# Patient Record
Sex: FEMALE | Race: WHITE | Employment: OTHER | URBAN - NONMETROPOLITAN AREA
[De-identification: names, ages, dates, MRNs, and addresses within clinical notes are randomized per-mention and may not be internally consistent; named-entity substitution may affect disease eponyms.]

---

## 2017-03-21 ENCOUNTER — HOSPITAL ENCOUNTER (EMERGENCY)
Facility: HOSPITAL | Age: 67
Discharge: HOME | End: 2017-03-21
Attending: NURSE PRACTITIONER | Admitting: NURSE PRACTITIONER
Payer: COMMERCIAL

## 2017-03-21 VITALS
TEMPERATURE: 97.1 F | RESPIRATION RATE: 18 BRPM | SYSTOLIC BLOOD PRESSURE: 165 MMHG | HEART RATE: 108 BPM | DIASTOLIC BLOOD PRESSURE: 95 MMHG | OXYGEN SATURATION: 98 %

## 2017-03-21 DIAGNOSIS — F32.A DEPRESSION, UNSPECIFIED DEPRESSION TYPE: ICD-10-CM

## 2017-03-21 DIAGNOSIS — E11.9 DIABETES MELLITUS WITHOUT COMPLICATION (H): ICD-10-CM

## 2017-03-21 DIAGNOSIS — R23.8 VESICULAR ERUPTION: ICD-10-CM

## 2017-03-21 LAB
ALBUMIN SERPL-MCNC: 3.5 G/DL (ref 3.4–5)
ALBUMIN UR-MCNC: NEGATIVE MG/DL
ALP SERPL-CCNC: 103 U/L (ref 40–150)
ALT SERPL W P-5'-P-CCNC: 37 U/L (ref 0–50)
ANION GAP SERPL CALCULATED.3IONS-SCNC: 6 MMOL/L (ref 3–14)
APPEARANCE UR: CLEAR
AST SERPL W P-5'-P-CCNC: 21 U/L (ref 0–45)
BACTERIA #/AREA URNS HPF: ABNORMAL /HPF
BASOPHILS # BLD AUTO: 0.1 10E9/L (ref 0–0.2)
BASOPHILS NFR BLD AUTO: 0.9 %
BILIRUB SERPL-MCNC: 0.5 MG/DL (ref 0.2–1.3)
BILIRUB UR QL STRIP: NEGATIVE
BUN SERPL-MCNC: 8 MG/DL (ref 7–30)
CALCIUM SERPL-MCNC: 8.8 MG/DL (ref 8.5–10.1)
CHLORIDE SERPL-SCNC: 101 MMOL/L (ref 94–109)
CO2 SERPL-SCNC: 27 MMOL/L (ref 20–32)
COLOR UR AUTO: YELLOW
CREAT SERPL-MCNC: 0.63 MG/DL (ref 0.52–1.04)
DIFFERENTIAL METHOD BLD: ABNORMAL
EOSINOPHIL # BLD AUTO: 0.2 10E9/L (ref 0–0.7)
EOSINOPHIL NFR BLD AUTO: 3.5 %
ERYTHROCYTE [DISTWIDTH] IN BLOOD BY AUTOMATED COUNT: 13.6 % (ref 10–15)
EST. AVERAGE GLUCOSE BLD GHB EST-MCNC: 280 MG/DL
GFR SERPL CREATININE-BSD FRML MDRD: ABNORMAL ML/MIN/1.7M2
GLUCOSE SERPL-MCNC: 294 MG/DL (ref 70–99)
GLUCOSE UR STRIP-MCNC: >1000 MG/DL
HBA1C MFR BLD: 11.4 % (ref 4.3–6)
HCT VFR BLD AUTO: 48 % (ref 35–47)
HGB BLD-MCNC: 15.8 G/DL (ref 11.7–15.7)
HGB UR QL STRIP: NEGATIVE
IMM GRANULOCYTES # BLD: 0 10E9/L (ref 0–0.4)
IMM GRANULOCYTES NFR BLD: 0.3 %
KETONES UR STRIP-MCNC: NEGATIVE MG/DL
LEUKOCYTE ESTERASE UR QL STRIP: ABNORMAL
LYMPHOCYTES # BLD AUTO: 1.4 10E9/L (ref 0.8–5.3)
LYMPHOCYTES NFR BLD AUTO: 24.6 %
MCH RBC QN AUTO: 28.3 PG (ref 26.5–33)
MCHC RBC AUTO-ENTMCNC: 32.9 G/DL (ref 31.5–36.5)
MCV RBC AUTO: 86 FL (ref 78–100)
MICRO REPORT STATUS: NORMAL
MONOCYTES # BLD AUTO: 0.3 10E9/L (ref 0–1.3)
MONOCYTES NFR BLD AUTO: 5.9 %
NEUTROPHILS # BLD AUTO: 3.7 10E9/L (ref 1.6–8.3)
NEUTROPHILS NFR BLD AUTO: 64.8 %
NITRATE UR QL: NEGATIVE
NRBC # BLD AUTO: 0 10*3/UL
NRBC BLD AUTO-RTO: 0 /100
PH UR STRIP: 7 PH (ref 4.7–8)
PLATELET # BLD AUTO: 245 10E9/L (ref 150–450)
POTASSIUM SERPL-SCNC: 3.9 MMOL/L (ref 3.4–5.3)
PROT SERPL-MCNC: 7 G/DL (ref 6.8–8.8)
RBC # BLD AUTO: 5.58 10E12/L (ref 3.8–5.2)
RBC #/AREA URNS AUTO: 2 /HPF (ref 0–2)
SODIUM SERPL-SCNC: 134 MMOL/L (ref 133–144)
SP GR UR STRIP: 1.03 (ref 1–1.03)
SPECIMEN SOURCE: NORMAL
SQUAMOUS #/AREA URNS AUTO: 6 /HPF (ref 0–1)
URN SPEC COLLECT METH UR: ABNORMAL
UROBILINOGEN UR STRIP-MCNC: NORMAL MG/DL (ref 0–2)
WBC # BLD AUTO: 5.7 10E9/L (ref 4–11)
WBC #/AREA URNS AUTO: 4 /HPF (ref 0–2)
WET PREP SPEC: NORMAL

## 2017-03-21 PROCEDURE — 81001 URINALYSIS AUTO W/SCOPE: CPT | Performed by: NURSE PRACTITIONER

## 2017-03-21 PROCEDURE — 80053 COMPREHEN METABOLIC PANEL: CPT | Performed by: NURSE PRACTITIONER

## 2017-03-21 PROCEDURE — 99214 OFFICE O/P EST MOD 30 MIN: CPT

## 2017-03-21 PROCEDURE — 83036 HEMOGLOBIN GLYCOSYLATED A1C: CPT | Performed by: NURSE PRACTITIONER

## 2017-03-21 PROCEDURE — 36415 COLL VENOUS BLD VENIPUNCTURE: CPT | Performed by: NURSE PRACTITIONER

## 2017-03-21 PROCEDURE — 87529 HSV DNA AMP PROBE: CPT | Performed by: NURSE PRACTITIONER

## 2017-03-21 PROCEDURE — 85025 COMPLETE CBC W/AUTO DIFF WBC: CPT | Performed by: NURSE PRACTITIONER

## 2017-03-21 PROCEDURE — 87210 SMEAR WET MOUNT SALINE/INK: CPT | Performed by: NURSE PRACTITIONER

## 2017-03-21 PROCEDURE — 40000788 ZZHCL STATISTIC ESTIMATED AVERAGE GLUCOSE: Performed by: NURSE PRACTITIONER

## 2017-03-21 PROCEDURE — 99213 OFFICE O/P EST LOW 20 MIN: CPT | Performed by: NURSE PRACTITIONER

## 2017-03-21 PROCEDURE — 87529 HSV DNA AMP PROBE: CPT | Mod: 59 | Performed by: NURSE PRACTITIONER

## 2017-03-21 RX ORDER — VALACYCLOVIR HYDROCHLORIDE 1 G/1
1000 TABLET, FILM COATED ORAL 2 TIMES DAILY
Qty: 14 TABLET | Refills: 0 | Status: SHIPPED | OUTPATIENT
Start: 2017-03-21 | End: 2017-03-22

## 2017-03-21 RX ORDER — ALBUTEROL SULFATE 90 UG/1
2 AEROSOL, METERED RESPIRATORY (INHALATION) EVERY 4 HOURS PRN
Qty: 1 INHALER | Refills: 0 | Status: SHIPPED | OUTPATIENT
Start: 2017-03-21 | End: 2018-07-14

## 2017-03-21 RX ORDER — FAMCICLOVIR 250 MG/1
500 TABLET ORAL 3 TIMES DAILY
Qty: 42 TABLET | Refills: 0 | Status: SHIPPED | OUTPATIENT
Start: 2017-03-21 | End: 2017-03-22

## 2017-03-21 ASSESSMENT — ENCOUNTER SYMPTOMS
FEVER: 0
APPETITE CHANGE: 0
CHILLS: 0
ARTHRALGIAS: 0
HEMATURIA: 0
FREQUENCY: 0
MYALGIAS: 0
FATIGUE: 0
DYSURIA: 0

## 2017-03-21 NOTE — ED NOTES
"Patient being evaluated today for swollen genitalia X 3 days. She notes that for the past 2 months she has been having external itching, and a \"lump\" that she would notice on occasion. She has been treating at home with heat, showers, and vagisil. Patient notes some relief from itching, but now new swelling noted. Patient has appt next week to establish with PCP after a lapse in insurance. Patient denies any fever or discharge.   "

## 2017-03-21 NOTE — ED AVS SNAPSHOT
[facility]    [address] [place] [address]    Phone:  [phone]                                       Sola Gan   MRN: [id number]    Department:  [facility]   Date of Visit:  3/21/2017           After Visit Summary Signature Page     I have received my discharge instructions, and my questions have been answered. I have discussed any challenges I see with this plan with the nurse or doctor.    ..........................................................................................................................................  Patient/Patient Representative Signature      ..........................................................................................................................................  Patient Representative Print Name and Relationship to Patient    ..................................................               ................................................  Date                                            Time    ..........................................................................................................................................  Reviewed by Signature/Title    ...................................................              ..............................................  Date                                                            Time

## 2017-03-21 NOTE — ED AVS SNAPSHOT
[facility]    [address] [address]    Phone:  [phone]                                       Sola Gan   MRN: [id number]    Department:  [facility]   Date of Visit:  3/21/2017           Patient Information     Date Of Birth          1950        Your diagnoses for this visit were:     Diabetes mellitus without complication (H)     Vesicular eruption genital, HSV testing pending    Depression, unspecified depression type        You were seen by Zuri Contreras NP.      Follow-up Information     Follow up with [facility].    Specialty:  EMERGENCY MEDICINE    Why:  As needed, If symptoms worsen, or concerns develop    Contact information:    [address] [address] [phone]    Additional information:    From [facility]: Take [address]. Turn left at [address]. Turn left at the first stoplight on [address] [place] [address]. At the first stop sign, take a right onto [address]. Take a left into the parking lot and continue through until you reach the North enterance of the building.       From [place]: Take [address]. Take the [address] ramp towards [place]. Turn left onto [address]. Take a slight right onto [address]. Turn left at the first stoplight on [address]. At the first stop sign, take a right onto [address]. Take a left into the parking lot and continue through until you reach the North enterance of the building.       From [place]: Take [address]. Take a right at [address]. At the first stop sign, take a right onto [address]. Take a left into the parking lot and continue through until you reach the North enterance of the building.         Follow up with Favian Recinos MD.    Specialty:  Family Practice    Why:  Keep scheduled appointment for establish care on Tuesday 3-29    Contact information:    [facility]  [facility]  [address]  [facility] [address]  [phone]        Discharge References/Attachments     DEPRESSION (ENGLISH)    DEPRESSION, COUNSELING FOR (ENGLISH)    DIABETES, DO YOU HAVE? (ENGLISH)    DIABETIC DIET (ENGLISH)    HERPES GENITALIS, HSV: TYPE II (ENGLISH)      Future Appointments        Provider Department Dept Phone Center    3/28/2017 10:30 AM Favian Recinos MD [facility] [phone] [facility]      ED Discharge Orders     DIABETES EDUCATION REFERRAL ([place])       DIABETES SELF-MANAGEMENT TRAINING (DSMT)  Type of training and number of hours requested:  Initial Group DMST; per educater discretion    (Medicare will cover:10 hours initial DSMT in 12-month period, plus 2 hours follow-up DSMT annually)    Please add if the patient has special educational need: None and Other: per educator discretion     (Patients with special needs requiring individual DSMT)    Please include the following DMST content: All ten content areas, as appropriate    Patient has the following:Obesity    Please begin Medical Nutritional Therapy.  Initial Medical Nutritional Therapy (MNT)  ([employer] allows 3 hours initial MNT in the first calendar year, plus two hours follow up MNT annually.  Additional MNT hours are available for change in medical condition treatment and/or diagnosis)    Additional Services Provided:  >>A1c will be completed upon referral and completion of program unless completed in clinic.  >>Influenza vaccination assessment (form #N228) as applicable.  >>Order for diabetes supplies will be faxed to patient's pharmacy.  >>If on insulin: Insulin dose adjustment per staged Diabetes Mgmt. Protocols    [facility]  [facility]  Telephone:  [phone]   Fax:  [phone]            MENTAL HEALTH REFERRAL       Your provider has referred you to:  [facility], Tatyana Che or Zuri Ricci    All scheduling is subject to the client's  specific insurance plan & benefits, provider/location availability, and provider clinical specialities.  Please arrive 15 minutes early for your first appointment and bring your completed paperwork.    Please be aware that coverage of these services is subject to the terms and limitations of your health insurance plan.  Call member services at your health plan with any benefit or coverage questions.                     Review of your medicines      START taking        Dose / Directions Last dose taken    metFORMIN 500 MG tablet   Commonly known as:  GLUCOPHAGE   Dose:  500 mg   Quantity:  60 tablet        Take 1 tablet (500 mg) by mouth 2 times daily (with meals)   Refills:  0        valACYclovir 1000 mg tablet   Commonly known as:  VALTREX   Dose:  1000 mg   Quantity:  14 tablet        Take 1 tablet (1,000 mg) by mouth 2 times daily for 7 days   Refills:  0          CONTINUE these medicines which may have CHANGED, or have new prescriptions. If we are uncertain of the size of tablets/capsules you have at home, strength may be listed as something that might have changed.        Dose / Directions Last dose taken    * ALBUTEROL INHALER 17GM   Dose:  2 puff   What changed:  Another medication with the same name was added. Make sure you understand how and when to take each.   Quantity:  1 Inhaler        Inhale 2 puffs into the lungs 4 times daily as needed This product no longer available   Refills:  0        * albuterol 108 (90 BASE) MCG/ACT Inhaler   Commonly known as:  albuterol   Dose:  2 puff   What changed:  You were already taking a medication with the same name, and this prescription was added. Make sure you understand how and when to take each.   Quantity:  1 Inhaler        Inhale 2 puffs into the lungs every 4 hours as needed for shortness of breath / dyspnea   Refills:  0        * Notice:  This list has 2 medication(s) that are the same as other medications prescribed for you. Read the directions carefully,  and ask your doctor or other care provider to review them with you.      Our records show that you are taking the medicines listed below. If these are incorrect, please call your family doctor or clinic.        Dose / Directions Last dose taken    ADVIL PO   Dose:  600 mg        Take 600 mg by mouth every 8 hours as needed for moderate pain   Refills:  0        hydrochlorothiazide 25 MG tablet   Commonly known as:  HYDRODIURIL   Dose:  25 mg   Quantity:  30 tablet        Take 1 tablet (25 mg) by mouth daily   Refills:  2        Ipratropium-Albuterol  MCG/ACT inhaler   Commonly known as:  COMBIVENT RESPIMAT   Dose:  1 puff   Quantity:  1 Inhaler        Inhale 1 puff into the lungs 4 times daily as needed for shortness of breath / dyspnea or wheezing Not to exceed 6 doses per day.   Refills:  1        lisinopril 10 MG tablet   Commonly known as:  PRINIVIL/ZESTRIL   Dose:  10 mg   Quantity:  15 tablet        Take 1 tablet (10 mg) by mouth daily   Refills:  1        loratadine 10 MG tablet   Commonly known as:  CLARITIN   Dose:  10 mg        Take 10 mg by mouth daily.   Refills:  0        PEPCID PO   Dose:  10 mg        Take 10 mg by mouth daily.   Refills:  0                Prescriptions were sent or printed at these locations (3 Prescriptions)                   [facility] [address] [facility], [place] - [address] AT [facility] [address]   [address], [facility] [address]    Telephone:  [phone]   Fax:  [phone]   Hours:                  E-Prescribed (3 of 3)         valACYclovir (VALTREX) 1000 mg tablet               metFORMIN (GLUCOPHAGE) 500 MG tablet               albuterol (ALBUTEROL) 108 (90 BASE) MCG/ACT Inhaler                Procedures and tests performed during your visit     CBC with platelets differential    Comprehensive metabolic panel    Estimated Average Glucose    HSV 1 and 2 DNA by PCR    Hemoglobin A1c    UA reflex to Microscopic and Culture    Wet prep      Orders Needing  "Specimen Collection     None      Pending Results     Date and Time Order Name Status Description    3/21/2017 1252 Estimated Average Glucose In process     3/21/2017 1222 HSV 1 and 2 DNA by PCR In process             Pending Culture Results     Date and Time Order Name Status Description    3/21/2017 1222 HSV 1 and 2 DNA by PCR In process             Thank you for choosing [facility]       Thank you for choosing [facility] for your care. Our goal is always to provide you with excellent care. Hearing back from our patients is one way we can continue to improve our services. Please take a few minutes to complete the written survey that you may receive in the mail after you visit with us. Thank you!        [employer] Information     [employer] lets you send messages to your doctor, view your test results, renew your prescriptions, schedule appointments and more. To sign up, go to [email or web address] . Click on \"Log in\" on the left side of the screen, which will take you to the Welcome page. Then click on \"Sign up Now\" on the right side of the page.     You will be asked to enter the access code listed below, as well as some personal information. Please follow the directions to create your username and password.     Your access code is: [email or web address]  Expires: 2017  2:06 PM     Your access code will  in 90 days. If you need help or a new code, please call your [facility] clinic or [phone].        Care EveryWhere ID     This is your Care EveryWhere ID. This could be used by other organizations to access your [facility] medical records  [id number]        After Visit Summary       This is your record. Keep this with you and show to your community pharmacist(s) and doctor(s) at your next visit.                  "

## 2017-03-21 NOTE — ED PROVIDER NOTES
"  History     Chief Complaint   Patient presents with     Vaginal Itching     for months     Wound Check     \"lump\" to the left of clitoris, intermittent swelling     The history is provided by the patient. No  was used.     Sola Gan is a 66 year old female who presents today with a CC of a \"nodule\" by her clitoris.  She notes that she has been having vaginal itching for 3-4 months.  She was treating the itching with Vagisil cream, warm soaks, OTC cortisone cream.  She also added tea tree oil and lavender essential oils.  This has helped decrease the itching.  She denies vaginal discharge.  No fevers.  She is post menopausal.  She is not taking any hormones.  She is not currently sexual active, reports has not been sexually active for several years.   She denies concern for STI.  She has been treated for a squamous cell carcinoma 7 years ago.  She has a history of genital warts, no known history of herpes.  She has a history of COPD, hypertension.    She is a 1/2 ppd smoker.     I have reviewed the Medications, Allergies, Past Medical and Surgical History, and Social History in the Epic system.    Review of Systems   Constitutional: Negative for appetite change, chills, fatigue and fever.   Genitourinary: Positive for genital sores. Negative for dysuria, frequency, hematuria, pelvic pain, vaginal bleeding, vaginal discharge and vaginal pain.   Musculoskeletal: Negative for arthralgias and myalgias.   Psychiatric/Behavioral:        Reports a history of depression, not currently on antidepressants, requests Mental Health referral for counseling, no suicidal ideation       Physical Exam   BP: 165/95  Pulse: 108  Temp: 97.1  F (36.2  C)  Resp: 18  SpO2: 98 %    Physical Exam   Constitutional: She is oriented to person, place, and time. She appears well-developed and well-nourished. She is cooperative.   HENT:   Head: Normocephalic and atraumatic.   Neck: Normal range of motion. Neck supple. "   Cardiovascular: Normal rate and regular rhythm.    Pulmonary/Chest: Effort normal and breath sounds normal.   Abdominal: Soft. Bowel sounds are normal. There is no tenderness. There is no CVA tenderness.   Abdomen is obese   Genitourinary: There is tenderness and lesion (diffuse ulcerated and vesicular lesions on labia majora and labia minora bilaterally, lesions are exquisitely TTP) on the right labia. There is no injury on the right labia. There is tenderness and lesion on the left labia. There is no injury on the left labia. Cervix exhibits no motion tenderness and no friability. Right adnexum displays no mass, no tenderness and no fullness. Left adnexum displays no mass, no tenderness and no fullness. No erythema, tenderness or bleeding in the vagina. No foreign body in the vagina. No signs of injury around the vagina. Vaginal discharge (mild amount of thin white) found.   Lymphadenopathy:     She has no cervical adenopathy.        Right: No supraclavicular adenopathy present.        Left: No supraclavicular adenopathy present.   Neurological: She is alert and oriented to person, place, and time.   Skin: Skin is warm and dry.   Psychiatric:   Teary when discussed new diagnosis of DM, but discussed that she is hopeful for the future.  Is motivated to quit smoking, eat better, start exercising.  Reports history of depression, has been on Prozac in past, no currently treated with counseling or antidepressants.     Nursing note and vitals reviewed.      ED Course     ED Course     Procedures     Results for orders placed or performed during the hospital encounter of 03/21/17   UA reflex to Microscopic and Culture   Result Value Ref Range    Color Urine Yellow     Appearance Urine Clear     Glucose Urine >1000 (A) NEG mg/dL    Bilirubin Urine Negative NEG    Ketones Urine Negative NEG mg/dL    Specific Gravity Urine 1.033 1.003 - 1.035    Blood Urine Negative NEG    pH Urine 7.0 4.7 - 8.0 pH    Protein Albumin  Urine Negative NEG mg/dL    Urobilinogen mg/dL Normal 0.0 - 2.0 mg/dL    Nitrite Urine Negative NEG    Leukocyte Esterase Urine Small (A) NEG    Source Midstream Urine     RBC Urine 2 0 - 2 /HPF    WBC Urine 4 (H) 0 - 2 /HPF    Bacteria Urine None (A) NEG /HPF    Squamous Epithelial /HPF Urine 6 (H) 0 - 1 /HPF   Hemoglobin A1c   Result Value Ref Range    Hemoglobin A1C 11.4 (H) 4.3 - 6.0 %   Comprehensive metabolic panel   Result Value Ref Range    Sodium 134 133 - 144 mmol/L    Potassium 3.9 3.4 - 5.3 mmol/L    Chloride 101 94 - 109 mmol/L    Carbon Dioxide 27 20 - 32 mmol/L    Anion Gap 6 3 - 14 mmol/L    Glucose 294 (H) 70 - 99 mg/dL    Urea Nitrogen 8 7 - 30 mg/dL    Creatinine 0.63 0.52 - 1.04 mg/dL    GFR Estimate >90  Non  GFR Calc   >60 mL/min/1.7m2    GFR Estimate If Black >90   GFR Calc   >60 mL/min/1.7m2    Calcium 8.8 8.5 - 10.1 mg/dL    Bilirubin Total 0.5 0.2 - 1.3 mg/dL    Albumin 3.5 3.4 - 5.0 g/dL    Protein Total 7.0 6.8 - 8.8 g/dL    Alkaline Phosphatase 103 40 - 150 U/L    ALT 37 0 - 50 U/L    AST 21 0 - 45 U/L   CBC with platelets differential   Result Value Ref Range    WBC 5.7 4.0 - 11.0 10e9/L    RBC Count 5.58 (H) 3.8 - 5.2 10e12/L    Hemoglobin 15.8 (H) 11.7 - 15.7 g/dL    Hematocrit 48.0 (H) 35.0 - 47.0 %    MCV 86 78 - 100 fl    MCH 28.3 26.5 - 33.0 pg    MCHC 32.9 31.5 - 36.5 g/dL    RDW 13.6 10.0 - 15.0 %    Platelet Count 245 150 - 450 10e9/L    Diff Method Automated Method     % Neutrophils 64.8 %    % Lymphocytes 24.6 %    % Monocytes 5.9 %    % Eosinophils 3.5 %    % Basophils 0.9 %    % Immature Granulocytes 0.3 %    Nucleated RBCs 0 0 /100    Absolute Neutrophil 3.7 1.6 - 8.3 10e9/L    Absolute Lymphocytes 1.4 0.8 - 5.3 10e9/L    Absolute Monocytes 0.3 0.0 - 1.3 10e9/L    Absolute Eosinophils 0.2 0.0 - 0.7 10e9/L    Absolute Basophils 0.1 0.0 - 0.2 10e9/L    Abs Immature Granulocytes 0.0 0 - 0.4 10e9/L    Absolute Nucleated RBC 0.0    Estimated  Average Glucose   Result Value Ref Range    Estimated Average Glucose 280 mg/dL   Wet prep   Result Value Ref Range    Specimen Description Vagina     Wet Prep       No Trichomonas seen  No clue cells seen  No yeast seen  Rare WBC'S seen      Micro Report Status FINAL 03/21/2017      > 1000 mg/dl glucose in urine so further lab work up done.      Assessments & Plan (with Medical Decision Making)     I have reviewed the nursing notes.    I have reviewed the findings, diagnosis, plan and need for follow up with the patient.  ASSESSMENT / PLAN:  (E11.9) Diabetes mellitus without complication (H)  Comment: new diagnosis, incidental finding  Plan:  DIABETES EDUCATION REFERRAL ([place]) - patient has appointment tomorrow morning at 0800        Metformin as prescribed    (R23.8) Vesicular eruption  Comment: genital, HSV testing pending  Plan:  Will treat for suspected herpes outbreak, testing pending, benefit of treatment of possible negative HSV outweighs risk of treatment.  Advised patient we will call with results when available   Keep scheduled appointment with Dr Recinos 1 week from today to establish care    (F32.9) Depression, unspecified depression type  Comment: acute on chronic  Plan:  MENTAL HEALTH REFERRAL - per patient request      Discharge Medication List as of 3/21/2017  2:06 PM      START taking these medications    Details   valACYclovir (VALTREX) 1000 mg tablet Take 1 tablet (1,000 mg) by mouth 2 times daily for 7 days, Disp-14 tablet, R-0, E-Prescribe      metFORMIN (GLUCOPHAGE) 500 MG tablet Take 1 tablet (500 mg) by mouth 2 times daily (with meals), Disp-60 tablet, R-0, E-Prescribe      albuterol (ALBUTEROL) 108 (90 BASE) MCG/ACT Inhaler Inhale 2 puffs into the lungs every 4 hours as needed for shortness of breath / dyspnea, Disp-1 Inhaler, R-0, E-Prescribe             Final diagnoses:   Diabetes mellitus without complication (H)   Vesicular eruption - genital, HSV testing pending   Depression,  unspecified depression type       3/21/2017   [facility]     Zuri Contreras NP  03/22/17 0802

## 2017-03-21 NOTE — ED NOTES
Patient discharged to home per self. Patient verbalize/demonstrate understanding of all written and verbal instructions. Prescriptions escribed to local pharmacy. All questions answered.

## 2017-03-22 ENCOUNTER — HOSPITAL ENCOUNTER (OUTPATIENT)
Dept: EDUCATION SERVICES | Facility: HOSPITAL | Age: 67
Discharge: HOME | End: 2017-03-22
Attending: FAMILY MEDICINE | Admitting: FAMILY MEDICINE
Payer: COMMERCIAL

## 2017-03-22 ENCOUNTER — HOSPITAL ENCOUNTER (EMERGENCY)
Facility: HOSPITAL | Age: 67
Discharge: HOME | End: 2017-03-22
Attending: NURSE PRACTITIONER | Admitting: NURSE PRACTITIONER
Payer: COMMERCIAL

## 2017-03-22 VITALS
OXYGEN SATURATION: 94 % | HEIGHT: 61 IN | BODY MASS INDEX: 48.24 KG/M2 | SYSTOLIC BLOOD PRESSURE: 160 MMHG | HEART RATE: 103 BPM | DIASTOLIC BLOOD PRESSURE: 96 MMHG | WEIGHT: 255.5 LBS

## 2017-03-22 VITALS
SYSTOLIC BLOOD PRESSURE: 160 MMHG | HEART RATE: 101 BPM | DIASTOLIC BLOOD PRESSURE: 95 MMHG | TEMPERATURE: 98.5 F | OXYGEN SATURATION: 97 % | RESPIRATION RATE: 18 BRPM

## 2017-03-22 DIAGNOSIS — R11.0 NAUSEA: ICD-10-CM

## 2017-03-22 DIAGNOSIS — E11.65 TYPE 2 DIABETES MELLITUS WITH HYPERGLYCEMIA, WITHOUT LONG-TERM CURRENT USE OF INSULIN (H): Primary | ICD-10-CM

## 2017-03-22 DIAGNOSIS — Z71.89 ACP (ADVANCE CARE PLANNING): Chronic | ICD-10-CM

## 2017-03-22 PROCEDURE — 99213 OFFICE O/P EST LOW 20 MIN: CPT | Performed by: NURSE PRACTITIONER

## 2017-03-22 PROCEDURE — 25000125 ZZHC RX 250: Performed by: NURSE PRACTITIONER

## 2017-03-22 PROCEDURE — G0108 DIAB MANAGE TRN  PER INDIV: HCPCS

## 2017-03-22 PROCEDURE — 99213 OFFICE O/P EST LOW 20 MIN: CPT

## 2017-03-22 RX ORDER — ONDANSETRON 4 MG/1
4 TABLET, ORALLY DISINTEGRATING ORAL ONCE
Status: COMPLETED | OUTPATIENT
Start: 2017-03-22 | End: 2017-03-22

## 2017-03-22 RX ADMIN — ONDANSETRON 4 MG: 4 TABLET, ORALLY DISINTEGRATING ORAL at 11:37

## 2017-03-22 ASSESSMENT — ANXIETY QUESTIONNAIRES
GAD7 TOTAL SCORE: 6
1. FEELING NERVOUS, ANXIOUS, OR ON EDGE: SEVERAL DAYS
2. NOT BEING ABLE TO STOP OR CONTROL WORRYING: MORE THAN HALF THE DAYS
5. BEING SO RESTLESS THAT IT IS HARD TO SIT STILL: NOT AT ALL
7. FEELING AFRAID AS IF SOMETHING AWFUL MIGHT HAPPEN: NOT AT ALL
6. BECOMING EASILY ANNOYED OR IRRITABLE: SEVERAL DAYS
IF YOU CHECKED OFF ANY PROBLEMS ON THIS QUESTIONNAIRE, HOW DIFFICULT HAVE THESE PROBLEMS MADE IT FOR YOU TO DO YOUR WORK, TAKE CARE OF THINGS AT HOME, OR GET ALONG WITH OTHER PEOPLE: NOT DIFFICULT AT ALL
3. WORRYING TOO MUCH ABOUT DIFFERENT THINGS: SEVERAL DAYS

## 2017-03-22 ASSESSMENT — PAIN SCALES - GENERAL: PAINLEVEL: NO PAIN (0)

## 2017-03-22 ASSESSMENT — ENCOUNTER SYMPTOMS
NEUROLOGICAL NEGATIVE: 1
NAUSEA: 1
VOMITING: 0
ABDOMINAL PAIN: 0

## 2017-03-22 ASSESSMENT — PATIENT HEALTH QUESTIONNAIRE - PHQ9: 5. POOR APPETITE OR OVEREATING: SEVERAL DAYS

## 2017-03-22 NOTE — DISCHARGE INSTRUCTIONS
Stop Acyclovir for 24 hours. If nausea resolves will request a prior authorization for another anti-viral medication.   Continue with metformin 1 time daily as advised by diabetic educater.   Call here tomorrow and let us know how you are feeling. Based on symptoms it will help us figure out what is causing the nausea and will treat accordingly.      Drink clear liquids and eat bland foods for a few hours and advance your diet as tolerated.

## 2017-03-22 NOTE — IP AVS SNAPSHOT
MRN:[id number]                      After Visit Summary   3/22/2017    Sola Gan    MRN: [id number]           Thank you!     Thank you for choosing [facility] for your care. Our goal is always to provide you with excellent care. Hearing back from our patients is one way we can continue to improve our services. Please take a few minutes to complete the written survey that you may receive in the mail after you visit with us. Thank you!        Patient Information     Date Of Birth          1950        About your hospital stay     You were admitted on:  March 22, 2017 You last received care in the:  [facility]    You were discharged on:  March 22, 2017       Who to Call     For medical emergencies, please call 911.  For non-urgent questions about your medical care, please call your primary care provider or clinic, None          Attending Provider     Provider Specialty    Favian Recinos MD Family Practice       Primary Care Provider    None       No address on file        Your next 10 appointments already scheduled     Mar 28, 2017 10:30 AM CDT   (Arrive by 10:15 AM)   Office Visit with Favian Recinos MD   [facility] ([facility])    [address] [place] [address]   [phone]           Bring a current list of meds and any records pertaining to this visit.  For Physicals, please bring immunization records and any forms needing to be filled out.    Please arrive 15 minutes early to complete paperwork and register.              Further instructions from your care team       Please test your BG every morning before you have your coffee and/or eat and test 2 hours after your evening meal.    Keep a food log    Take Metformin 500 mg daily for a week then increase to Metformin 500 mg am and pm.    Return to Diabetes Ed - Wednesday,April 5, 2017, 10:45 am.    Questions/concerns: nurse: [phone] or HUC: [phone]    Pending Results     Date and Time  "Order Name Status Description    3/21/2017 1222 HSV 1 and 2 DNA by PCR In process             Admission Information     Date & Time Provider Department Dept. Phone    3/22/2017 Favian Recinos MD [facility] [phone]      Your Vitals Were     Blood Pressure Pulse Height Weight Pulse Oximetry BMI (Body Mass Index)    160/96 (BP Location: Right arm, Patient Position: Chair, Cuff Size: Adult Large) 103 1.549 m (5' 1\") 115.9 kg (255 lb 8 oz) 94% 48.28 kg/m2      My[employer] Information     [employer] lets you send messages to your doctor, view your test results, renew your prescriptions, schedule appointments and more. To sign up, go to [email or web address] . Click on \"Log in\" on the left side of the screen, which will take you to the Welcome page. Then click on \"Sign up Now\" on the right side of the page.     You will be asked to enter the access code listed below, as well as some personal information. Please follow the directions to create your username and password.     Your access code is: [email or web address]  Expires: 2017  2:06 PM     Your access code will  in 90 days. If you need help or a new code, please call your [facility] clinic or [phone].        Care EveryWhere ID     This is your Care EveryWhere ID. This could be used by other organizations to access your [facility] medical records  [id number]           Review of your medicines      UNREVIEWED medicines. Ask your doctor about these medicines        Dose / Directions    ACYCLOVIR PO        Taking 5 times a day for 7 days   Refills:  0       ADVIL PO        Dose:  600 mg   Take 600 mg by mouth every 8 hours as needed for moderate pain   Refills:  0       albuterol 108 (90 BASE) MCG/ACT Inhaler   Commonly known as:  albuterol        Dose:  2 puff   Inhale 2 puffs into the lungs every 4 hours as needed for shortness of breath / dyspnea   Quantity:  1 Inhaler   Refills:  0       hydrochlorothiazide 25 MG tablet   Commonly known as:  " HYDRODIURIL   Used for:  HTN (hypertension)        Dose:  25 mg   Take 1 tablet (25 mg) by mouth daily   Quantity:  30 tablet   Refills:  2       Ipratropium-Albuterol  MCG/ACT inhaler   Commonly known as:  COMBIVENT RESPIMAT   Used for:  COPD (chronic obstructive pulmonary disease) (H)        Dose:  1 puff   Inhale 1 puff into the lungs 4 times daily as needed for shortness of breath / dyspnea or wheezing Not to exceed 6 doses per day.   Quantity:  1 Inhaler   Refills:  1       lisinopril 10 MG tablet   Commonly known as:  PRINIVIL/ZESTRIL        Dose:  10 mg   Take 1 tablet (10 mg) by mouth daily   Quantity:  15 tablet   Refills:  1       loratadine 10 MG tablet   Commonly known as:  CLARITIN        Dose:  10 mg   Take 10 mg by mouth daily.   Refills:  0       metFORMIN 500 MG tablet   Commonly known as:  GLUCOPHAGE        Dose:  500 mg   Take 1 tablet (500 mg) by mouth 2 times daily (with meals)   Quantity:  60 tablet   Refills:  0       PEPCID PO        Dose:  10 mg   Take 10 mg by mouth daily.   Refills:  0                Protect others around you: Learn how to safely use, store and throw away your medicines at www.disposemymeds.org.             Medication List: This is a list of all your medications and when to take them. Check marks below indicate your daily home schedule. Keep this list as a reference.      Medications           Morning Afternoon Evening Bedtime As Needed    ACYCLOVIR PO   Taking 5 times a day for 7 days                                ADVIL PO   Take 600 mg by mouth every 8 hours as needed for moderate pain                                albuterol 108 (90 BASE) MCG/ACT Inhaler   Commonly known as:  albuterol   Inhale 2 puffs into the lungs every 4 hours as needed for shortness of breath / dyspnea                                hydrochlorothiazide 25 MG tablet   Commonly known as:  HYDRODIURIL   Take 1 tablet (25 mg) by mouth daily                                Ipratropium-Albuterol   MCG/ACT inhaler   Commonly known as:  COMBIVENT RESPIMAT   Inhale 1 puff into the lungs 4 times daily as needed for shortness of breath / dyspnea or wheezing Not to exceed 6 doses per day.                                lisinopril 10 MG tablet   Commonly known as:  PRINIVIL/ZESTRIL   Take 1 tablet (10 mg) by mouth daily                                loratadine 10 MG tablet   Commonly known as:  CLARITIN   Take 10 mg by mouth daily.                                metFORMIN 500 MG tablet   Commonly known as:  GLUCOPHAGE   Take 1 tablet (500 mg) by mouth 2 times daily (with meals)                                PEPCID PO   Take 10 mg by mouth daily.

## 2017-03-22 NOTE — ED PROVIDER NOTES
History     Chief Complaint   Patient presents with     Nausea     from taking new meds that were Rx'd yesterday. would like a Rx for zofran if possible     The history is provided by the patient. No  was used.     Sola Gan is a 66 year old female who presents with complaints of nausea. Requesting zofran. Yesterday Sola was here and started on Valtrex for a vesicular eruption on the labia and metformin 500 mg BID for new diagnosis of diabetes. Diabetic educator decreased her to metformin 1 time daily for a week. Has taken pepto for symptoms and no relief. Has not had primary care for some time. Is to establish with Dr. Recinos next week.     I have reviewed the Medications, Allergies, Past Medical and Surgical History, and Social History in the Epic system.    Review of Systems   Gastrointestinal: Positive for nausea. Negative for abdominal pain and vomiting.   Neurological: Negative.        Physical Exam   BP: 160/95  Pulse: 101  Temp: 98.5  F (36.9  C)  Resp: 18  SpO2: 97 %  Physical Exam   Constitutional: She is oriented to person, place, and time. She appears well-developed and well-nourished. No distress.   Pulmonary/Chest: Effort normal.   Musculoskeletal: Normal range of motion.   Neurological: She is alert and oriented to person, place, and time.   Skin: She is not diaphoretic.   Psychiatric: She has a normal mood and affect. Her behavior is normal.   Nursing note and vitals reviewed.      ED Course     ED Course     Procedures         Medications   ondansetron (ZOFRAN-ODT) ODT tab 4 mg (not administered)     Assessments & Plan (with Medical Decision Making)     I have reviewed the nursing notes.  I have reviewed the findings, diagnosis, plan and need for follow up with the patient.  Will treat nausea here.   Advised to stop acyclovir for 24 hours.   Call here tomorrow and let me know symptoms. If nausea is resolved will put through prior auth for valtrex.   If sx persist, will  attribute it to metformin and resume acyclovir.     Final diagnoses:   Nausea       3/22/2017   [facility]     Aide Recinos NP  03/22/17 9022

## 2017-03-22 NOTE — PROGRESS NOTES
"Sola is here for Session 1. /96 (BP Location: Right arm, Patient Position: Chair, Cuff Size: Adult Large)  Pulse 103  Ht 1.549 m (5' 1\")  Wt 115.9 kg (255 lb 8 oz)  SpO2 94%  BMI 48.28 kg/m2     BG readings as follows: not testing as yet    Lab Results   Component Value Date    A1C 11.4 03/21/2017       Current diabetes medications: Metformin 500 mg bid    Sola started both Acyclovir and Metformin yesterday. She is experiencing nausea, cramps and diarrhea which may be attributed to the Acyclovir, but may be attributed to Metformin. Instructed her to take one Metformin 500 tablet daily for now until she is done with taking the Acyclovir.    Sola does eat 3 times daily. Starts her day with coffee with creamer. Then a couple of hours later eats breakfast: BishopVineloopn breakfast bowl or a pastry. Lunch is a frozen meal: asian or pasta. Evening meal can be rotisserie chicken with corn and mashed potatoes or pizza. Discusses meal sides can be starchy beans or pork and beans.    Readiness to learn: eager    Barriers to learning: none    Topics covered: Diabetes pathophysiology, symptoms, diagnosis, treatments, medication actions, BG self-monitoring, HgbA1c, targets (blood sugar/A1c), sharps disposal, complications and risk factors,  food planning, benefits of physical activity, carbohydrate counting, and individualized food plan. Encouraged label reading to determine carbs in frozen food choices and recommended some potential frozen food choices    Pt actively participated and demonstrated adequate understanding of topics discussed.    Pt instructed on One Touch Verio glucose meter and appropriately demonstrated use with minimal cueing. Random BG 1 hours post parandial = 325.  Testing supplies ordered.     Plan:  Please test your BG every morning before you have your coffee and/or eat and test 2 hours after your evening meal.    Keep a food log    Take Metformin 500 mg daily for a week then increase to " Metformin 500 mg am and pm.    Return to Diabetes Ed - Wednesday,April 5, 2017, 10:45 am.    Questions/concerns: nurse: [phone] or HUC: [phone]    Time spent with patient: 100 minutes

## 2017-03-22 NOTE — NURSING NOTE
"Chief Complaint   Patient presents with     Diabetes     new       Initial /96 (BP Location: Right arm, Patient Position: Chair, Cuff Size: Adult Large)  Pulse 103  Ht 1.549 m (5' 1\")  Wt 115.9 kg (255 lb 8 oz)  SpO2 94%  BMI 48.28 kg/m2 Estimated body mass index is 48.28 kg/(m^2) as calculated from the following:    Height as of this encounter: 1.549 m (5' 1\").    Weight as of this encounter: 115.9 kg (255 lb 8 oz).  Medication Reconciliation: complete   Tia Virgen      "

## 2017-03-22 NOTE — DISCHARGE INSTRUCTIONS
Please test your BG every morning before you have your coffee and/or eat and test 2 hours after your evening meal.    Keep a food log    Take Metformin 500 mg daily for a week then increase to Metformin 500 mg am and pm.    Return to Diabetes Ed - Wednesday,April 5, 2017, 10:45 am.    Questions/concerns: nurse: [phone] or [facility]: [phone]

## 2017-03-22 NOTE — ED AVS SNAPSHOT
[facility]    [address] [address]    Phone:  [phone]                                       Sola Gan   MRN: [id number]    Department:  [facility]   Date of Visit:  3/22/2017           Patient Information     Date Of Birth          1950        Your diagnoses for this visit were:     Nausea        You were seen by Aide Recinos NP.      Follow-up Information     Please follow up.    Why:  Call here tomorrow with symptom report        Please follow up.    Why:  See Dr. Recinos next week for follow up.        Discharge Instructions       Stop Acyclovir for 24 hours. If nausea resolves will request a prior authorization for another anti-viral medication.   Continue with metformin 1 time daily as advised by diabetic educater.   Call here tomorrow and let us know how you are feeling. Based on symptoms it will help us figure out what is causing the nausea and will treat accordingly.      Drink clear liquids and eat bland foods for a few hours and advance your diet as tolerated.       Future Appointments        Provider Department Dept Phone Center    3/28/2017 10:30 AM Favian Recinos MD [facility] [phone] [facility]         Review of your medicines      Our records show that you are taking the medicines listed below. If these are incorrect, please call your family doctor or clinic.        Dose / Directions Last dose taken    ACYCLOVIR PO        Taking 5 times a day for 7 days   Refills:  0        ADVIL PO   Dose:  600 mg        Take 600 mg by mouth every 8 hours as needed for moderate pain   Refills:  0        albuterol 108 (90 BASE) MCG/ACT Inhaler   Commonly known as:  albuterol   Dose:  2 puff   Quantity:  1 Inhaler        Inhale 2 puffs into the lungs every 4 hours as needed for shortness of breath / dyspnea   Refills:  0        hydrochlorothiazide 25 MG tablet   Commonly known as:  HYDRODIURIL   Dose:  25 mg  "  Quantity:  30 tablet        Take 1 tablet (25 mg) by mouth daily   Refills:  2        Ipratropium-Albuterol  MCG/ACT inhaler   Commonly known as:  COMBIVENT RESPIMAT   Dose:  1 puff   Quantity:  1 Inhaler        Inhale 1 puff into the lungs 4 times daily as needed for shortness of breath / dyspnea or wheezing Not to exceed 6 doses per day.   Refills:  1        lisinopril 10 MG tablet   Commonly known as:  PRINIVIL/ZESTRIL   Dose:  10 mg   Quantity:  15 tablet        Take 1 tablet (10 mg) by mouth daily   Refills:  1        loratadine 10 MG tablet   Commonly known as:  CLARITIN   Dose:  10 mg        Take 10 mg by mouth daily.   Refills:  0        metFORMIN 500 MG tablet   Commonly known as:  GLUCOPHAGE   Dose:  500 mg   Quantity:  60 tablet        Take 1 tablet (500 mg) by mouth 2 times daily (with meals)   Refills:  0        PEPCID PO   Dose:  10 mg        Take 10 mg by mouth daily.   Refills:  0                Orders Needing Specimen Collection     None      Pending Results     Date and Time Order Name Status Description    3/21/2017 1222 HSV 1 and 2 DNA by PCR In process             Pending Culture Results     Date and Time Order Name Status Description    3/21/2017 1222 HSV 1 and 2 DNA by PCR In process             Thank you for choosing [facility]       Thank you for choosing [facility] for your care. Our goal is always to provide you with excellent care. Hearing back from our patients is one way we can continue to improve our services. Please take a few minutes to complete the written survey that you may receive in the mail after you visit with us. Thank you!        [facility] [employer] Information     [employer] lets you send messages to your doctor, view your test results, renew your prescriptions, schedule appointments and more. To sign up, go to [email or web address] . Click on \"Log in\" on the left side of the screen, which will take you to the Welcome page. Then click on \"Sign up Now\" on the right side of the page. "     You will be asked to enter the access code listed below, as well as some personal information. Please follow the directions to create your username and password.     Your access code is: [email or web address]  Expires: 2017  2:06 PM     Your access code will  in 90 days. If you need help or a new code, please call your [place] clinic or [phone].        Care EveryWhere ID     This is your Care EveryWhere ID. This could be used by other organizations to access your [place] medical records  [id number]        After Visit Summary       This is your record. Keep this with you and show to your community pharmacist(s) and doctor(s) at your next visit.

## 2017-03-23 RX ORDER — LANCETS 33 GAUGE
1 EACH MISCELLANEOUS 2 TIMES DAILY
Qty: 100 EACH | Refills: 10 | Status: SHIPPED | OUTPATIENT
Start: 2017-03-23 | End: 2017-04-05

## 2017-03-23 RX ORDER — VALACYCLOVIR HYDROCHLORIDE 1 G/1
1000 TABLET, FILM COATED ORAL 2 TIMES DAILY
Qty: 20 TABLET | Refills: 0 | Status: SHIPPED | OUTPATIENT
Start: 2017-03-23 | End: 2017-04-13

## 2017-03-23 ASSESSMENT — PATIENT HEALTH QUESTIONNAIRE - PHQ9: SUM OF ALL RESPONSES TO PHQ QUESTIONS 1-9: 3

## 2017-03-23 ASSESSMENT — ANXIETY QUESTIONNAIRES: GAD7 TOTAL SCORE: 6

## 2017-03-24 ENCOUNTER — TELEPHONE (OUTPATIENT)
Dept: EDUCATION SERVICES | Facility: HOSPITAL | Age: 67
End: 2017-03-24

## 2017-03-24 LAB
HSV1 DNA SPEC QL NAA+PROBE: NEGATIVE
HSV2 DNA SPEC QL NAA+PROBE: ABNORMAL
SPECIMEN SOURCE: ABNORMAL

## 2017-03-24 NOTE — TELEPHONE ENCOUNTER
Returned patient's call.  Pt was wondering where she could get more test strips.  She has order in place to pick them up at [facility].  She was instructed to do so.

## 2017-03-24 NOTE — ED NOTES
TC to patient, notified that Valtrex Rx was approved and it is available at [facility].

## 2017-03-25 NOTE — PROGRESS NOTES
HSV results reviewed with provider, HAYDER Berry. Pt to continue course of treatment medication that was prescribed. Call placed to pt and informed of results, and the need to finish the course of medication that was prescribed, and to f/u with Dr. Recinos for any further concerns and to establish care as previously discussed. Pt mentioned that her symptoms are resolving. No concerns expressed.

## 2017-03-28 ENCOUNTER — OFFICE VISIT (OUTPATIENT)
Dept: FAMILY MEDICINE | Facility: OTHER | Age: 67
End: 2017-03-28
Attending: FAMILY MEDICINE
Payer: COMMERCIAL

## 2017-03-28 VITALS
BODY MASS INDEX: 47.71 KG/M2 | HEIGHT: 60 IN | RESPIRATION RATE: 14 BRPM | SYSTOLIC BLOOD PRESSURE: 140 MMHG | HEART RATE: 84 BPM | OXYGEN SATURATION: 98 % | WEIGHT: 243 LBS | TEMPERATURE: 98.1 F | DIASTOLIC BLOOD PRESSURE: 90 MMHG

## 2017-03-28 DIAGNOSIS — I10 BENIGN ESSENTIAL HYPERTENSION: ICD-10-CM

## 2017-03-28 DIAGNOSIS — J43.8 OTHER EMPHYSEMA (H): ICD-10-CM

## 2017-03-28 DIAGNOSIS — F32.89 OTHER DEPRESSION: Primary | ICD-10-CM

## 2017-03-28 LAB
ANION GAP SERPL CALCULATED.3IONS-SCNC: 10 MMOL/L (ref 3–14)
BUN SERPL-MCNC: 13 MG/DL (ref 7–30)
CALCIUM SERPL-MCNC: 9.3 MG/DL (ref 8.5–10.1)
CHLORIDE SERPL-SCNC: 103 MMOL/L (ref 94–109)
CHOLEST SERPL-MCNC: 173 MG/DL
CO2 SERPL-SCNC: 26 MMOL/L (ref 20–32)
CREAT SERPL-MCNC: 0.85 MG/DL (ref 0.52–1.04)
CREAT UR-MCNC: 103 MG/DL
GFR SERPL CREATININE-BSD FRML MDRD: 67 ML/MIN/1.7M2
GLUCOSE SERPL-MCNC: 180 MG/DL (ref 70–99)
HDLC SERPL-MCNC: 40 MG/DL
LDLC SERPL CALC-MCNC: 104 MG/DL
MICROALBUMIN UR-MCNC: 18 MG/L
MICROALBUMIN/CREAT UR: 17.09 MG/G CR (ref 0–25)
NONHDLC SERPL-MCNC: 133 MG/DL
POTASSIUM SERPL-SCNC: 3.9 MMOL/L (ref 3.4–5.3)
SODIUM SERPL-SCNC: 139 MMOL/L (ref 133–144)
TRIGL SERPL-MCNC: 147 MG/DL
TSH SERPL DL<=0.005 MIU/L-ACNC: 2.83 MU/L (ref 0.4–4)

## 2017-03-28 PROCEDURE — 84443 ASSAY THYROID STIM HORMONE: CPT | Performed by: FAMILY MEDICINE

## 2017-03-28 PROCEDURE — 80061 LIPID PANEL: CPT | Performed by: FAMILY MEDICINE

## 2017-03-28 PROCEDURE — 86803 HEPATITIS C AB TEST: CPT | Performed by: FAMILY MEDICINE

## 2017-03-28 PROCEDURE — 36415 COLL VENOUS BLD VENIPUNCTURE: CPT | Performed by: FAMILY MEDICINE

## 2017-03-28 PROCEDURE — 99000 SPECIMEN HANDLING OFFICE-LAB: CPT | Performed by: FAMILY MEDICINE

## 2017-03-28 PROCEDURE — 99213 OFFICE O/P EST LOW 20 MIN: CPT

## 2017-03-28 PROCEDURE — 99214 OFFICE O/P EST MOD 30 MIN: CPT | Performed by: FAMILY MEDICINE

## 2017-03-28 PROCEDURE — 80048 BASIC METABOLIC PNL TOTAL CA: CPT | Performed by: FAMILY MEDICINE

## 2017-03-28 PROCEDURE — 82043 UR ALBUMIN QUANTITATIVE: CPT | Performed by: FAMILY MEDICINE

## 2017-03-28 RX ORDER — LISINOPRIL 10 MG/1
10 TABLET ORAL DAILY
Qty: 30 TABLET | Refills: 1 | Status: SHIPPED | OUTPATIENT
Start: 2017-03-28 | End: 2017-05-25

## 2017-03-28 ASSESSMENT — PAIN SCALES - GENERAL: PAINLEVEL: MILD PAIN (3)

## 2017-03-28 NOTE — MR AVS SNAPSHOT
After Visit Summary   3/28/2017    Sola Gan    MRN: [id number]           Patient Information     Date Of Birth          1950        Visit Information        Provider Department      3/28/2017 10:30 AM Favian Recinos MD [facility]        Today's Diagnoses     Other depression    -  1    Uncontrolled type 2 diabetes mellitus with complication, without long-term current use of insulin (H)        Other emphysema (H)        Benign essential hypertension           Follow-ups after your visit        Your next 10 appointments already scheduled     Apr 13, 2017 10:15 AM CDT   (Arrive by 10:00 AM)   SHORT with Favian Recinos MD   [facility] ([facility])    [address] [facility] [address]   [phone]            May 31, 2017 10:00 AM CDT   (Arrive by 9:45 AM)   New Visit with Tatyana Antoine [facility] [place] [facility] ([facility])    [address] [place] [id number] [phone]              Who to contact     If you have questions or need follow up information about today's clinic visit or your schedule please contact [facility] directly at [phone].  Normal or non-critical lab and imaging results will be communicated to you by MyChart, letter or phone within 4 business days after the clinic has received the results. If you do not hear from us within 7 days, please contact the clinic through MyChart or phone. If you have a critical or abnormal lab result, we will notify you by phone as soon as possible.  Submit refill requests through [employer] or call your pharmacy and they will forward the refill request to us. Please allow 3 business days for your refill to be completed.          Additional Information About Your Visit        [facility] Information     [employer] gives you secure access to your electronic health record. If you see a primary care provider, you can also send messages to your care team and  "make appointments. If you have questions, please call your primary care clinic.  If you do not have a primary care provider, please call [phone] and they will assist you.        Care EveryWhere ID     This is your Care EveryWhere ID. This could be used by other organizations to access your [place] medical records  [id number]        Your Vitals Were     Pulse Temperature Respirations Height Pulse Oximetry BMI (Body Mass Index)    84 98.1  F (36.7  C) (Tympanic) 14 5' 0.04\" (1.525 m) 98% 47.39 kg/m2       Blood Pressure from Last 3 Encounters:   03/28/17 140/90   03/22/17 160/95   03/22/17 160/96    Weight from Last 3 Encounters:   03/28/17 243 lb (110.2 kg)   03/22/17 255 lb 8 oz (115.9 kg)   03/30/14 272 lb 0.8 oz (123.4 kg)              We Performed the Following     Albumin Random Urine Quantitative     Basic metabolic panel     Foot Exam - HIM Scan     Hepatitis C antibody     Lipid Profile (Chol, Trig, HDL, LDL calc)     TSH with free T4 reflex          Today's Medication Changes          These changes are accurate as of: 3/28/17 11:40 AM.  If you have any questions, ask your nurse or doctor.               Start taking these medicines.        Dose/Directions    FLUoxetine 20 MG capsule   Commonly known as:  PROzac   Used for:  Other depression   Started by:  Favian Recinos MD        Dose:  20 mg   Take 1 capsule (20 mg) by mouth daily   Quantity:  90 capsule   Refills:  1         Stop taking these medicines if you haven't already. Please contact your care team if you have questions.     ACYCLOVIR PO   Stopped by:  Favian Recinos MD                Where to get your medicines      These medications were sent to [id number] - [place], [place] - [address] AT [facility] [address], [place] [address]     Phone:  [phone]     FLUoxetine 20 MG capsule    Ipratropium-Albuterol  MCG/ACT inhaler    lisinopril 10 MG tablet                Primary Care Provider    " None       No address on file        Thank you!     Thank you for choosing [facility]  for your care. Our goal is always to provide you with excellent care. Hearing back from our patients is one way we can continue to improve our services. Please take a few minutes to complete the written survey that you may receive in the mail after your visit with us. Thank you!             Your Updated Medication List - Protect others around you: Learn how to safely use, store and throw away your medicines at www.disposemymeds.org.          This list is accurate as of: 3/28/17 11:40 AM.  Always use your most recent med list.                   Brand Name Dispense Instructions for use    ADVIL PO      Take 600 mg by mouth every 8 hours as needed for moderate pain       albuterol 108 (90 BASE) MCG/ACT Inhaler    albuterol    1 Inhaler    Inhale 2 puffs into the lungs every 4 hours as needed for shortness of breath / dyspnea       blood glucose monitoring test strip    ONE TOUCH VERIO IQ    100 each    Use to test blood sugar 2 times daily.       FLUoxetine 20 MG capsule    PROzac    90 capsule    Take 1 capsule (20 mg) by mouth daily       hydrochlorothiazide 25 MG tablet    HYDRODIURIL    30 tablet    Take 1 tablet (25 mg) by mouth daily       Ipratropium-Albuterol  MCG/ACT inhaler    COMBIVENT RESPIMAT    1 Inhaler    Inhale 1 puff into the lungs 4 times daily as needed for shortness of breath / dyspnea or wheezing Not to exceed 6 doses per day.       lisinopril 10 MG tablet    PRINIVIL/ZESTRIL    30 tablet    Take 1 tablet (10 mg) by mouth daily       loratadine 10 MG tablet    CLARITIN     Take 10 mg by mouth daily.       metFORMIN 500 MG tablet    GLUCOPHAGE    60 tablet    Take 1 tablet (500 mg) by mouth 2 times daily (with meals)       ONETOUCH DELICA LANCETS 33G Misc     100 each    1 each 2 times daily Test BG 2 times daily       PEPCID PO      Take 10 mg by mouth daily pepcid complete chewable        valACYclovir 1000 mg tablet    VALTREX    20 tablet    Take 1 tablet (1,000 mg) by mouth 2 times daily for 10 days

## 2017-03-28 NOTE — NURSING NOTE
"Chief Complaint   Patient presents with     Establish Care     COPD     needs an order for combivent has been out for 3 years      Hypertension     needs rx rx for lisinopril and she states was on lasix at one time never got lisinopril      *_* Health Care Directive *_*     form given        Initial /84 (BP Location: Left arm, Patient Position: Chair, Cuff Size: Adult Large)  Pulse 84  Temp 98.1  F (36.7  C) (Tympanic)  Resp 14  Ht 5' 0.04\" (1.525 m)  Wt 243 lb (110.2 kg)  SpO2 98%  BMI 47.39 kg/m2 Estimated body mass index is 47.39 kg/(m^2) as calculated from the following:    Height as of this encounter: 5' 0.04\" (1.525 m).    Weight as of this encounter: 243 lb (110.2 kg).  Medication Reconciliation: complete     HEIKE TEJADA      "

## 2017-03-28 NOTE — PROGRESS NOTES
Sola LEWIS Elvia    March 28, 2017    Chief Complaint   Patient presents with     Establish Care     COPD     needs an order for combivent has been out for 3 years      Hypertension     needs rx rx for lisinopril and she states was on lasix at one time never got lisinopril      *_* Health Care Directive *_*     form given        SUBJECTIVE:  Here to establish cares.  Recent dx of dm.  Has obesity, tobacco, remote hx of herpes currently with flare on acyclovir.  Lengthy discussion today.   We discussed health goals, what I'm seeing, vascular health and kidney health, etc.  See below.      Past Medical History:   Diagnosis Date     H/O sleep apnea     tonsils, adnoids, and uvula removed       Past Surgical History:   Procedure Laterality Date     APPENDECTOMY       CHOLECYSTECTOMY       ENT SURGERY      for MORA     GYN SURGERY       HERNIA REPAIR         Current Outpatient Prescriptions   Medication Sig Dispense Refill     FLUoxetine (PROZAC) 20 MG capsule Take 1 capsule (20 mg) by mouth daily 90 capsule 1     Ipratropium-Albuterol (COMBIVENT RESPIMAT)  MCG/ACT inhaler Inhale 1 puff into the lungs 4 times daily as needed for shortness of breath / dyspnea or wheezing Not to exceed 6 doses per day. 1 Inhaler 1     lisinopril (PRINIVIL/ZESTRIL) 10 MG tablet Take 1 tablet (10 mg) by mouth daily 30 tablet 1     valACYclovir (VALTREX) 1000 mg tablet Take 1 tablet (1,000 mg) by mouth 2 times daily for 10 days 20 tablet 0     blood glucose monitoring (ONE TOUCH VERIO IQ) test strip Use to test blood sugar 2 times daily. 100 each 10     ONETOUCH DELICA LANCETS 33G MISC 1 each 2 times daily Test BG 2 times daily 100 each 10     Ibuprofen (ADVIL PO) Take 600 mg by mouth every 8 hours as needed for moderate pain       metFORMIN (GLUCOPHAGE) 500 MG tablet Take 1 tablet (500 mg) by mouth 2 times daily (with meals) 60 tablet 0     albuterol (ALBUTEROL) 108 (90 BASE) MCG/ACT Inhaler Inhale 2 puffs into the lungs every 4 hours as  needed for shortness of breath / dyspnea 1 Inhaler 0     Famotidine (PEPCID PO) Take 10 mg by mouth daily pepcid complete chewable       loratadine (CLARITIN) 10 MG tablet Take 10 mg by mouth daily.       [DISCONTINUED] lisinopril (PRINIVIL,ZESTRIL) 10 MG tablet Take 1 tablet (10 mg) by mouth daily (Patient not taking: Reported on 3/28/2017) 15 tablet 1     hydrochlorothiazide (HYDRODIURIL) 25 MG tablet Take 1 tablet (25 mg) by mouth daily (Patient not taking: Reported on 3/28/2017) 30 tablet 2     [DISCONTINUED] Ipratropium-Albuterol (COMBIVENT RESPIMAT)  MCG/ACT inhaler Inhale 1 puff into the lungs 4 times daily as needed for shortness of breath / dyspnea or wheezing Not to exceed 6 doses per day. (Patient not taking: Reported on 3/28/2017) 1 Inhaler 1       Allergies   Allergen Reactions     Acyclovir Nausea     Sulfa Drugs Hives       Family History   Problem Relation Age of Onset     Other Cancer Mother      ovarian      Breast Cancer Mother      Bronchitis Father      Peripheral Neuropathy Father      Hypertension Father        Social History     Social History     Marital status: Single     Spouse name: N/A     Number of children: N/A     Years of education: N/A     Occupational History     Not on file.     Social History Main Topics     Smoking status: Current Every Day Smoker     Packs/day: 0.50     Years: 20.00     Smokeless tobacco: Never Used     Alcohol use No     Drug use: No     Sexual activity: Not on file     Other Topics Concern     Not on file     Social History Narrative       5 point ROS negative except as noted above in HPI, including Gen., Resp., CV, GI &  system review.     OBJECTIVE:  B/P: 140/90, T: 98.1, P: 84, R: 14    GENERAL APPEARANCE: Alert, no acute distress  CV: regular rate and rhythm, no murmur, rub or gallop  RESP: lungs clear to auscultation bilaterally  ABDOMEN: normal bowel sounds, soft, nontender, no hepatosplenomegaly or other masses.  Obese.    Feet:  Normal  monofilament bilaterally.   SKIN: no suspicious lesions or rashes to visualized skin  NEURO: Alert, oriented x 3, speech and mentation normal    ASSESSMENT and PLAN:  (F32.89) Other depression  (primary encounter diagnosis)  Comment: discussed.    Plan: FLUoxetine (PROZAC) 20 MG capsule        She wants to be back on prozac.  Good response to this medicine in the past.      (E11.8,  E11.65) Uncontrolled type 2 diabetes mellitus with complication, without long-term current use of insulin (H)  Comment: discussed at some length.   Plan: Foot Exam - HIM Scan        Has had some improvement with the metformin start.  Titrate up to bid.  F/u with dm ed again next week.     (J43.8) Other emphysema (H)  Comment: stable.  Plan: Ipratropium-Albuterol (COMBIVENT RESPIMAT)          MCG/ACT inhaler        Advise tobacco cessation.  Once we get this dm stuff all squared up we are going to push on it a bit.  Otherwise, continue previous tx.      (I10) Benign essential hypertension  Comment: borderline.   Plan: lisinopril (PRINIVIL/ZESTRIL) 10 MG tablet        Reviewed.  Indication for ACE either way.  Start lisinopril.  2 week f/u.  Labs today and again in 2 weeks.          45 minutes spent with patient, reviewing chart, and discussing all of the recent dx of dm, future cares going forward, etc.

## 2017-03-30 LAB — HCV AB SERPL QL IA: NORMAL

## 2017-04-05 ENCOUNTER — HOSPITAL ENCOUNTER (OUTPATIENT)
Dept: EDUCATION SERVICES | Facility: HOSPITAL | Age: 67
Discharge: HOME | End: 2017-04-05
Attending: NURSE PRACTITIONER | Admitting: NURSE PRACTITIONER
Payer: COMMERCIAL

## 2017-04-05 VITALS
OXYGEN SATURATION: 97 % | BODY MASS INDEX: 46.63 KG/M2 | WEIGHT: 247 LBS | HEART RATE: 88 BPM | DIASTOLIC BLOOD PRESSURE: 86 MMHG | HEIGHT: 61 IN | SYSTOLIC BLOOD PRESSURE: 142 MMHG

## 2017-04-05 DIAGNOSIS — E11.65 TYPE 2 DIABETES MELLITUS WITH HYPERGLYCEMIA, WITHOUT LONG-TERM CURRENT USE OF INSULIN (H): Primary | ICD-10-CM

## 2017-04-05 PROCEDURE — G0108 DIAB MANAGE TRN  PER INDIV: HCPCS

## 2017-04-05 ASSESSMENT — PAIN SCALES - GENERAL: PAINLEVEL: NO PAIN (0)

## 2017-04-05 NOTE — PROGRESS NOTES
"Sola is here today for Session 2. /86 (BP Location: Right arm, Patient Position: Chair, Cuff Size: Adult Regular)  Pulse 88  Ht 1.549 m (5' 1\")  Wt 112 kg (247 lb)  SpO2 97%  BMI 46.67 kg/m2  She is accompanied by her daughter, [place].      BG readings as follows: fastin-226, post-supper: 171-270, hs: 132, 211-261    Blood pressure 142/86, pulse 88, height 1.549 m (5' 1\"), weight 112 kg (247 lb), SpO2 97 %.    Current diabetes medications: Metformin 500 mg bid, just starting taking the second 500 mg    Readiness to learn: Very eager. Sola and her daughter are very engaged in learning    Barriers to learning: none    Pt actively participated and demonstrated adequate understanding of topics discussed.     Topics covered: evaluating BG self-test results & improving self-testing skills, meter maintenance & , causes & treatment for high & low blood sugar, sick day management skills.     Goals: Will set at next visit    Plan:  Call in BG readings in 2 weeks - Tia [phone]    Follow up:   Please return to Diabetes Ed in one month.      Total time spent with patient: Total visit time 75 minutes      "

## 2017-04-05 NOTE — NURSING NOTE
"Chief Complaint   Patient presents with     Diabetes     session 2       Initial /86 (BP Location: Right arm, Patient Position: Chair, Cuff Size: Adult Regular)  Pulse 88  Ht 1.549 m (5' 1\")  Wt 112 kg (247 lb)  SpO2 97%  BMI 46.67 kg/m2 Estimated body mass index is 46.67 kg/(m^2) as calculated from the following:    Height as of this encounter: 1.549 m (5' 1\").    Weight as of this encounter: 112 kg (247 lb).  Medication Reconciliation: complete   Tia Virgen      "

## 2017-04-05 NOTE — IP AVS SNAPSHOT
MRN:[id number]                      After Visit Summary   4/5/2017    Soal Gan    MRN: [id number]           Thank you!     Thank you for choosing [place] [facility] for your care. Our goal is always to provide you with excellent care. Hearing back from our patients is one way we can continue to improve our services. Please take a few minutes to complete the written survey that you may receive in the mail after you visit with us. Thank you!        Patient Information     Date Of Birth          1950        About your hospital stay     You were admitted on:  April 5, 2017 You last received care in the:  [facility]    You were discharged on:  April 5, 2017       Who to Call     For medical emergencies, please call 911.  For non-urgent questions about your medical care, please call your primary care provider or clinic, None          Attending Provider     Provider Specialty    Lisy Radford NP --       Primary Care Provider    None       No address on file        Your next 10 appointments already scheduled     Apr 13, 2017 10:15 AM CDT   (Arrive by 10:00 AM)   SHORT with Favian Recinos MD   [facility] ([facility])    [address] [place] [address]   [phone]            May 31, 2017 10:00 AM CDT   (Arrive by 9:45 AM)   New Visit with Tatyana Antoine [facility] (Critical access hospital)    [address] [place] [address] [phone]              Further instructions from your care team       Call in BG readings in 2 weeks - [facility], [phone]    Please return to Diabetes Ed in one month.    Pending Results     No orders found from 4/3/2017 to 4/6/2017.            Admission Information     Date & Time Provider Department Dept. Phone    4/5/2017 Lisy Radford NP [facility] [phone]      Your Vitals Were     Blood Pressure Pulse Height Weight Pulse Oximetry BMI (Body Mass Index)    142/86 (BP Location:  "Right arm, Patient Position: Chair, Cuff Size: Adult Regular) 88 1.549 m (5' 1\") 112 kg (247 lb) 97% 46.67 kg/m2      We Information     We gives you secure access to your electronic health record. If you see a primary care provider, you can also send messages to your care team and make appointments. If you have questions, please call your primary care clinic.  If you do not have a primary care provider, please call [phone] and they will assist you.        Care EveryWhere ID     This is your Care EveryWhere ID. This could be used by other organizations to access your [place] medical records  [id number]           Review of your medicines      UNREVIEWED medicines. Ask your doctor about these medicines        Dose / Directions    ADVIL PO        Dose:  600 mg   Take 600 mg by mouth every 8 hours as needed for moderate pain   Refills:  0       albuterol 108 (90 BASE) MCG/ACT Inhaler   Commonly known as:  albuterol        Dose:  2 puff   Inhale 2 puffs into the lungs every 4 hours as needed for shortness of breath / dyspnea   Quantity:  1 Inhaler   Refills:  0       FLUoxetine 20 MG capsule   Commonly known as:  PROzac   Used for:  Other depression        Dose:  20 mg   Take 1 capsule (20 mg) by mouth daily   Quantity:  90 capsule   Refills:  1       hydrochlorothiazide 25 MG tablet   Commonly known as:  HYDRODIURIL   Used for:  HTN (hypertension)        Dose:  25 mg   Take 1 tablet (25 mg) by mouth daily   Quantity:  30 tablet   Refills:  2       Ipratropium-Albuterol  MCG/ACT inhaler   Commonly known as:  COMBIVENT RESPIMAT   Used for:  Other emphysema (H)        Dose:  1 puff   Inhale 1 puff into the lungs 4 times daily as needed for shortness of breath / dyspnea or wheezing Not to exceed 6 doses per day.   Quantity:  1 Inhaler   Refills:  1       lisinopril 10 MG tablet   Commonly known as:  PRINIVIL/ZESTRIL   Used for:  Benign essential hypertension        Dose:  10 mg   Take 1 tablet (10 " mg) by mouth daily   Quantity:  30 tablet   Refills:  1       loratadine 10 MG tablet   Commonly known as:  CLARITIN        Dose:  10 mg   Take 10 mg by mouth daily.   Refills:  0       metFORMIN 500 MG tablet   Commonly known as:  GLUCOPHAGE        Dose:  500 mg   Take 1 tablet (500 mg) by mouth 2 times daily (with meals)   Quantity:  60 tablet   Refills:  0       PEPCID PO        Dose:  10 mg   Take 10 mg by mouth daily pepcid complete chewable   Refills:  0       valACYclovir 1000 mg tablet   Commonly known as:  VALTREX        Dose:  1000 mg   Take 1 tablet (1,000 mg) by mouth 2 times daily for 10 days   Quantity:  20 tablet   Refills:  0         CONTINUE these medicines which have NOT CHANGED        Dose / Directions    blood glucose monitoring test strip   Commonly known as:  ONE TOUCH VERIO IQ   Used for:  Type 2 diabetes mellitus with hyperglycemia, without long-term current use of insulin (H)        Use to test blood sugar 2 times daily.   Quantity:  100 each   Refills:  10                Protect others around you: Learn how to safely use, store and throw away your medicines at www.disposemymeds.org.             Medication List: This is a list of all your medications and when to take them. Check marks below indicate your daily home schedule. Keep this list as a reference.      Medications           Morning Afternoon Evening Bedtime As Needed    ADVIL PO   Take 600 mg by mouth every 8 hours as needed for moderate pain                                albuterol 108 (90 BASE) MCG/ACT Inhaler   Commonly known as:  albuterol   Inhale 2 puffs into the lungs every 4 hours as needed for shortness of breath / dyspnea                                blood glucose monitoring test strip   Commonly known as:  ONE TOUCH VERIO IQ   Use to test blood sugar 2 times daily.                                FLUoxetine 20 MG capsule   Commonly known as:  PROzac   Take 1 capsule (20 mg) by mouth daily                                 hydrochlorothiazide 25 MG tablet   Commonly known as:  HYDRODIURIL   Take 1 tablet (25 mg) by mouth daily                                Ipratropium-Albuterol  MCG/ACT inhaler   Commonly known as:  COMBIVENT RESPIMAT   Inhale 1 puff into the lungs 4 times daily as needed for shortness of breath / dyspnea or wheezing Not to exceed 6 doses per day.                                lisinopril 10 MG tablet   Commonly known as:  PRINIVIL/ZESTRIL   Take 1 tablet (10 mg) by mouth daily                                loratadine 10 MG tablet   Commonly known as:  CLARITIN   Take 10 mg by mouth daily.                                metFORMIN 500 MG tablet   Commonly known as:  GLUCOPHAGE   Take 1 tablet (500 mg) by mouth 2 times daily (with meals)                                PEPCID PO   Take 10 mg by mouth daily pepcid complete chewable                                valACYclovir 1000 mg tablet   Commonly known as:  VALTREX   Take 1 tablet (1,000 mg) by mouth 2 times daily for 10 days

## 2017-04-06 NOTE — PROGRESS NOTES
Pt was here for Session 2.  She was here with her daughter who is very supportive of her efforts.      Pt's current weight is 247# and she is pleased with her weight loss of 8.1# since last visit.      Review of food logs and discussion notes that pt is doing a very good job of counting carbohydrates and keeping limits within range discussed at session 1.  Daughter does some of the cooking and has been looking for low carbohydrate alternatives.      Reviewed healthy choices for dining out.  Pt does not consume alcohol.      She seemed confident she can continue with changes she has made to her diet.      Follow up session 3 scheduled.

## 2017-04-13 ENCOUNTER — OFFICE VISIT (OUTPATIENT)
Dept: FAMILY MEDICINE | Facility: OTHER | Age: 67
End: 2017-04-13
Attending: FAMILY MEDICINE
Payer: COMMERCIAL

## 2017-04-13 VITALS
BODY MASS INDEX: 46.63 KG/M2 | HEIGHT: 61 IN | WEIGHT: 247 LBS | SYSTOLIC BLOOD PRESSURE: 126 MMHG | TEMPERATURE: 98.4 F | DIASTOLIC BLOOD PRESSURE: 84 MMHG | OXYGEN SATURATION: 98 % | HEART RATE: 94 BPM

## 2017-04-13 DIAGNOSIS — Z12.31 ENCOUNTER FOR SCREENING MAMMOGRAM FOR BREAST CANCER: ICD-10-CM

## 2017-04-13 DIAGNOSIS — Z12.11 COLON CANCER SCREENING: ICD-10-CM

## 2017-04-13 DIAGNOSIS — J43.8 OTHER EMPHYSEMA (H): Chronic | ICD-10-CM

## 2017-04-13 DIAGNOSIS — E11.9 CONTROLLED TYPE 2 DIABETES MELLITUS WITHOUT COMPLICATION, WITHOUT LONG-TERM CURRENT USE OF INSULIN (H): Primary | ICD-10-CM

## 2017-04-13 DIAGNOSIS — I10 ESSENTIAL HYPERTENSION: ICD-10-CM

## 2017-04-13 DIAGNOSIS — J30.1 SEASONAL ALLERGIC RHINITIS DUE TO POLLEN: ICD-10-CM

## 2017-04-13 LAB
ANION GAP SERPL CALCULATED.3IONS-SCNC: 11 MMOL/L (ref 3–14)
BUN SERPL-MCNC: 14 MG/DL (ref 7–30)
CALCIUM SERPL-MCNC: 9.1 MG/DL (ref 8.5–10.1)
CHLORIDE SERPL-SCNC: 104 MMOL/L (ref 94–109)
CO2 SERPL-SCNC: 23 MMOL/L (ref 20–32)
CREAT SERPL-MCNC: 0.72 MG/DL (ref 0.52–1.04)
GFR SERPL CREATININE-BSD FRML MDRD: 81 ML/MIN/1.7M2
GLUCOSE SERPL-MCNC: 194 MG/DL (ref 70–99)
POTASSIUM SERPL-SCNC: 4 MMOL/L (ref 3.4–5.3)
SODIUM SERPL-SCNC: 138 MMOL/L (ref 133–144)

## 2017-04-13 PROCEDURE — 80048 BASIC METABOLIC PNL TOTAL CA: CPT | Performed by: FAMILY MEDICINE

## 2017-04-13 PROCEDURE — 99214 OFFICE O/P EST MOD 30 MIN: CPT | Performed by: FAMILY MEDICINE

## 2017-04-13 PROCEDURE — 99212 OFFICE O/P EST SF 10 MIN: CPT | Performed by: COUNSELOR

## 2017-04-13 PROCEDURE — 36415 COLL VENOUS BLD VENIPUNCTURE: CPT | Performed by: FAMILY MEDICINE

## 2017-04-13 RX ORDER — ATORVASTATIN CALCIUM 40 MG/1
40 TABLET, FILM COATED ORAL DAILY
Qty: 90 TABLET | Refills: 3 | Status: SHIPPED | OUTPATIENT
Start: 2017-04-13 | End: 2018-03-23

## 2017-04-13 ASSESSMENT — PAIN SCALES - GENERAL: PAINLEVEL: NO PAIN (0)

## 2017-04-13 NOTE — NURSING NOTE
"Chief Complaint   Patient presents with     Recheck Medication       Initial /84  Pulse 94  Temp 98.4  F (36.9  C) (Tympanic)  Ht 5' 1\" (1.549 m)  Wt 247 lb (112 kg)  SpO2 98%  BMI 46.67 kg/m2 Estimated body mass index is 46.67 kg/(m^2) as calculated from the following:    Height as of this encounter: 5' 1\" (1.549 m).    Weight as of this encounter: 247 lb (112 kg).  Medication Reconciliation: complete       Pauly Perez    "

## 2017-04-13 NOTE — MR AVS SNAPSHOT
After Visit Summary   4/13/2017    Sola Gan    MRN: [id number]           Patient Information     Date Of Birth          1950        Visit Information        Provider Department      4/13/2017 10:15 AM Favian Recinos MD [facility]        Today's Diagnoses     Controlled type 2 diabetes mellitus without complication, without long-term current use of insulin (H)    -  1    Other emphysema (H)        Essential hypertension        Seasonal allergic rhinitis due to pollen        Colon cancer screening        Encounter for screening mammogram for breast cancer          Care Instructions    F/u with ongoing concerns.         Follow-ups after your visit        Additional Services     GENERAL SURG ADULT REFERRAL       Your provider has referred you to: colonoscopy consult    Please be aware that coverage of these services is subject to the terms and limitations of your health insurance plan.  Call member services at your health plan with any benefit or coverage questions.      Please bring the following with you to your appointment:    (1) Any X-Rays, CTs or MRIs which have been performed.  Contact the facility where they were done to arrange for  prior to your scheduled appointment.   (2) List of current medications   (3) This referral request   (4) Any documents/labs given to you for this referral                  Your next 10 appointments already scheduled     Apr 19, 2017 10:00 AM CDT   MAMMOGRAM with  [facility]    [address] [facility] [address]   [phone]           Do not wear any body powder, lotions, deodorant or perfume the day of the exam. Bring a list of all medications, especially hormones.  If your last mammogram was not done at [place], please bring your mammogram films. We will need the name of your MD/PA to send a copy of your report.            May 03, 2017 11:00 AM CDT   (Arrive by 10:45 AM)    Return Visit with Madison Zambrano RN, Cherelle Gaytan RD   [facility] ([facility] )    [address] [facility] [address] [phone]            May 22, 2017 10:00 AM CDT   (Arrive by 9:45 AM)   CONSULT with Rigo Coreas,    [facility] ([facility])    [address] [place] [id number] [phone]            May 31, 2017 10:00 AM CDT   (Arrive by 9:45 AM)   [facility]    [address]  [facility] [address] [phone]            Jul 17, 2017  8:45 AM CDT   (Arrive by 8:30 AM)   PHYSICAL with Favian Recinos MD   [facility] ([facility])    [address]  [facility] [address] [phone]              Who to contact     If you have questions or need follow up information about today's clinic visit or your schedule please contact [facility] directly at [phone].  Normal or non-critical lab and imaging results will be communicated to you by MyChart, letter or phone within 4 business days after the clinic has received the results. If you do not hear from us within 7 days, please contact the clinic through [employer] or phone. If you have a critical or abnormal lab result, we will notify you by phone as soon as possible.  Submit refill requests through [employer] or call your pharmacy and they will forward the refill request to us. Please allow 3 business days for your refill to be completed.          Additional Information About Your Visit        MyChart Information     [employer] gives you secure access to your electronic health record. If you see a primary care provider, you can also send messages to your care team and make appointments. If you have questions, please call your primary care clinic.  If you do not have a primary care provider, please call [phone] and they will assist you.        Care EveryWhere ID     This is  "your Care EveryWhere ID. This could be used by other organizations to access your [facility] medical records  [id number]        Your Vitals Were     Pulse Temperature Height Pulse Oximetry BMI (Body Mass Index)       94 98.4  F (36.9  C) (Tympanic) 5' 1\" (1.549 m) 98% 46.67 kg/m2        Blood Pressure from Last 3 Encounters:   04/13/17 126/84   04/05/17 142/86   03/28/17 140/90    Weight from Last 3 Encounters:   04/13/17 247 lb (112 kg)   04/05/17 247 lb (112 kg)   03/28/17 243 lb (110.2 kg)              We Performed the Following     Basic metabolic panel     GENERAL SURG ADULT REFERRAL     MA SCREENING DIGITAL BILATERAL (JUST[facility]ING)          Today's Medication Changes          These changes are accurate as of: 4/13/17 11:35 AM.  If you have any questions, ask your nurse or doctor.               Start taking these medicines.        Dose/Directions    aspirin 81 MG EC tablet   Used for:  Controlled type 2 diabetes mellitus without complication, without long-term current use of insulin (H)   Started by:  Favian Recinos MD        Dose:  81 mg   Take 1 tablet (81 mg) by mouth daily   Quantity:  90 tablet   Refills:  3       atorvastatin 40 MG tablet   Commonly known as:  LIPITOR   Used for:  Controlled type 2 diabetes mellitus without complication, without long-term current use of insulin (H)   Started by:  Favian Recinos MD        Dose:  40 mg   Take 1 tablet (40 mg) by mouth daily   Quantity:  90 tablet   Refills:  3            Where to get your medicines      [id number] - [place], [place] - [address] AT [facility] [address], [place] [address]     Phone:  [phone]     atorvastatin 40 MG tablet         Some of these will need a paper prescription and others can be bought over the counter.  Ask your nurse if you have questions.     You don't need a prescription for these medications     aspirin 81 MG EC tablet                Primary Care " Provider    None       No address on file        Thank you!     Thank you for choosing [facility]  for your care. Our goal is always to provide you with excellent care. Hearing back from our patients is one way we can continue to improve our services. Please take a few minutes to complete the written survey that you may receive in the mail after your visit with us. Thank you!             Your Updated Medication List - Protect others around you: Learn how to safely use, store and throw away your medicines at www.disposemymeds.org.          This list is accurate as of: 4/13/17 11:35 AM.  Always use your most recent med list.                   Brand Name Dispense Instructions for use    ADVIL PO      Take 600 mg by mouth every 8 hours as needed for moderate pain       albuterol 108 (90 BASE) MCG/ACT Inhaler    albuterol    1 Inhaler    Inhale 2 puffs into the lungs every 4 hours as needed for shortness of breath / dyspnea       aspirin 81 MG EC tablet     90 tablet    Take 1 tablet (81 mg) by mouth daily       atorvastatin 40 MG tablet    LIPITOR    90 tablet    Take 1 tablet (40 mg) by mouth daily       blood glucose monitoring test strip    ONE TOUCH VERIO IQ    100 each    Use to test blood sugar 2 times daily.       FLUoxetine 20 MG capsule    PROzac    90 capsule    Take 1 capsule (20 mg) by mouth daily       hydrochlorothiazide 25 MG tablet    HYDRODIURIL    30 tablet    Take 1 tablet (25 mg) by mouth daily       Ipratropium-Albuterol  MCG/ACT inhaler    COMBIVENT RESPIMAT    1 Inhaler    Inhale 1 puff into the lungs 4 times daily as needed for shortness of breath / dyspnea or wheezing Not to exceed 6 doses per day.       lisinopril 10 MG tablet    PRINIVIL/ZESTRIL    30 tablet    Take 1 tablet (10 mg) by mouth daily       loratadine 10 MG tablet    CLARITIN     Take 10 mg by mouth daily.       metFORMIN 500 MG tablet    GLUCOPHAGE    60 tablet    Take 1 tablet (500 mg) by mouth 2 times daily  (with meals)       PEPCID PO      Take 10 mg by mouth daily pepcid complete chewable

## 2017-04-13 NOTE — PROGRESS NOTES
Sola Gan    April 13, 2017    Chief Complaint   Patient presents with     Recheck Medication       SUBJECTIVE:  Here for f/u of all of her issues.  Overall doing better.  Sugars have been better.  Breathing is better for sure.  Tolerating the ace inhibitor without issue.  Having ongoing allergies and asked about claritin.  Discussed maintenance and upcoming physical with maintenance.  See below.      Past Medical History:   Diagnosis Date     H/O sleep apnea     tonsils, adnoids, and uvula removed       Past Surgical History:   Procedure Laterality Date     APPENDECTOMY       CHOLECYSTECTOMY       ENT SURGERY      for MORA     GYN SURGERY       HERNIA REPAIR         Current Outpatient Prescriptions   Medication Sig Dispense Refill     aspirin 81 MG EC tablet Take 1 tablet (81 mg) by mouth daily 90 tablet 3     atorvastatin (LIPITOR) 40 MG tablet Take 1 tablet (40 mg) by mouth daily 90 tablet 3     FLUoxetine (PROZAC) 20 MG capsule Take 1 capsule (20 mg) by mouth daily 90 capsule 1     Ipratropium-Albuterol (COMBIVENT RESPIMAT)  MCG/ACT inhaler Inhale 1 puff into the lungs 4 times daily as needed for shortness of breath / dyspnea or wheezing Not to exceed 6 doses per day. 1 Inhaler 1     lisinopril (PRINIVIL/ZESTRIL) 10 MG tablet Take 1 tablet (10 mg) by mouth daily 30 tablet 1     blood glucose monitoring (ONE TOUCH VERIO IQ) test strip Use to test blood sugar 2 times daily. 100 each 10     Ibuprofen (ADVIL PO) Take 600 mg by mouth every 8 hours as needed for moderate pain       metFORMIN (GLUCOPHAGE) 500 MG tablet Take 1 tablet (500 mg) by mouth 2 times daily (with meals) 60 tablet 0     albuterol (ALBUTEROL) 108 (90 BASE) MCG/ACT Inhaler Inhale 2 puffs into the lungs every 4 hours as needed for shortness of breath / dyspnea 1 Inhaler 0     hydrochlorothiazide (HYDRODIURIL) 25 MG tablet Take 1 tablet (25 mg) by mouth daily 30 tablet 2     Famotidine (PEPCID PO) Take 10 mg by mouth daily pepcid complete  chewable       loratadine (CLARITIN) 10 MG tablet Take 10 mg by mouth daily.         Allergies   Allergen Reactions     Acyclovir Nausea     Sulfa Drugs Hives       Family History   Problem Relation Age of Onset     Other Cancer Mother      ovarian      Breast Cancer Mother      Bronchitis Father      Peripheral Neuropathy Father      Hypertension Father        Social History     Social History     Marital status: Single     Spouse name: N/A     Number of children: N/A     Years of education: N/A     Occupational History     Not on file.     Social History Main Topics     Smoking status: Current Every Day Smoker     Packs/day: 0.50     Years: 20.00     Smokeless tobacco: Never Used     Alcohol use No     Drug use: No     Sexual activity: Not on file     Other Topics Concern     Not on file     Social History Narrative       5 point ROS negative except as noted above in HPI, including Gen., Resp., CV, GI &  system review.     OBJECTIVE:  B/P: 126/84, T: 98.4, P: 94, R: Data Unavailable    GENERAL APPEARANCE: Alert, no acute distress  CV: regular rate and rhythm, no murmur, rub or gallop  RESP: lungs clear to auscultation bilaterally  ABDOMEN: normal bowel sounds, soft, nontender, no hepatosplenomegaly or other masses  SKIN: no suspicious lesions or rashes to visualized skin  NEURO: Alert, oriented x 3, speech and mentation normal    ASSESSMENT and PLAN:  (E11.9) Controlled type 2 diabetes mellitus without complication, without long-term current use of insulin (H)  (primary encounter diagnosis)  Comment: lengthy discussion  Plan: aspirin 81 MG EC tablet, atorvastatin (LIPITOR)        40 MG tablet        Reviewed sugars, improved.  Starting statin and asa today.  F/u 3 months for cpe.      (J43.8) Other emphysema (H)  Comment: improved.   Plan: continue the inhalers.  They are helping a lot.      (I10) Essential hypertension  Comment: stable.   Plan: Basic metabolic panel        No change.  Bmp with the med addition  of the ace.      (J30.1) Seasonal allergic rhinitis due to pollen  Comment: discussed.   Plan: recommend claritin which has helped her.      (Z12.11) Colon cancer screening  Comment: discussed.   Plan: GENERAL SURG ADULT REFERRAL        Sent.     (Z12.31) Encounter for screening mammogram for breast cancer  Comment: discussed.   Plan: [facility] SCREENING DIGITAL BILATERAL ([place])        Sent.      Encourage tobacco cessation and she is approaching being ready.

## 2017-04-19 ENCOUNTER — TELEPHONE (OUTPATIENT)
Dept: EDUCATION SERVICES | Facility: HOSPITAL | Age: 67
End: 2017-04-19

## 2017-04-19 NOTE — TELEPHONE ENCOUNTER
Called Sola to review BG readings. She is taking Metformin 500 mg bid. States readings are in 120s to 140s in AM and usually in 140s post-supper. Will follow at Session 3.

## 2017-04-20 DIAGNOSIS — Z12.31 VISIT FOR SCREENING MAMMOGRAM: Primary | ICD-10-CM

## 2017-04-20 PROCEDURE — G0202 SCR MAMMO BI INCL CAD: HCPCS | Mod: TC

## 2017-04-20 PROCEDURE — G0202 SCR MAMMO BI INCL CAD: HCPCS | Mod: TC | Performed by: RADIOLOGY

## 2017-05-03 ENCOUNTER — HOSPITAL ENCOUNTER (OUTPATIENT)
Dept: EDUCATION SERVICES | Facility: HOSPITAL | Age: 67
Discharge: HOME | End: 2017-05-03
Attending: FAMILY MEDICINE | Admitting: FAMILY MEDICINE
Payer: COMMERCIAL

## 2017-05-03 VITALS
SYSTOLIC BLOOD PRESSURE: 140 MMHG | BODY MASS INDEX: 45.46 KG/M2 | HEIGHT: 61 IN | WEIGHT: 240.8 LBS | DIASTOLIC BLOOD PRESSURE: 79 MMHG | HEART RATE: 97 BPM | OXYGEN SATURATION: 97 %

## 2017-05-03 DIAGNOSIS — E11.65 TYPE 2 DIABETES MELLITUS WITH HYPERGLYCEMIA, WITHOUT LONG-TERM CURRENT USE OF INSULIN (H): Primary | ICD-10-CM

## 2017-05-03 DIAGNOSIS — E11.9 TYPE 2 DIABETES MELLITUS WITHOUT COMPLICATION, WITHOUT LONG-TERM CURRENT USE OF INSULIN (H): ICD-10-CM

## 2017-05-03 PROCEDURE — G0108 DIAB MANAGE TRN  PER INDIV: HCPCS

## 2017-05-03 ASSESSMENT — PAIN SCALES - GENERAL: PAINLEVEL: NO PAIN (0)

## 2017-05-03 NOTE — DISCHARGE INSTRUCTIONS
-Continue to follow carbohydrate counting meal plan.   -Try to add some routine exercise - exercise goal is 30 minutes most days of the week.   -Test glucose fasting and 2 hours after supper meal.  -Target levels are fasting , 2 hours after supper less than 180.   -Plan is to increase Metformin to 500 mg 3x/day.   -Weight today was 240# which is down 15# from initial visit.  -Follow up in 1 month.   -Call with any questions - Madison Villarreal [phone]

## 2017-05-03 NOTE — IP AVS SNAPSHOT
MRN:[id number]                      After Visit Summary   5/3/2017    Sola Gan    MRN: [id number]           Thank you!     Thank you for choosing [facility] for your care. Our goal is always to provide you with excellent care. Hearing back from our patients is one way we can continue to improve our services. Please take a few minutes to complete the written survey that you may receive in the mail after you visit with us. Thank you!        Patient Information     Date Of Birth          1950        About your hospital stay     You were admitted on:  May 3, 2017 You last received care in the:  [facility]    You were discharged on:  May 3, 2017       Who to Call     For medical emergencies, please call 911.  For non-urgent questions about your medical care, please call your primary care provider or clinic, None          Attending Provider     Provider Specialty    Favian Recinos MD [facility]       Primary Care Provider    None       No address on file        Your next 10 appointments already scheduled     May 22, 2017 10:00 AM CDT   (Arrive by 9:45 AM)   CONSULT with Rigo Coreas,    [facility] ([facility])    [address] [place] [address]   [phone]            May 31, 2017 10:00 AM CDT   (Arrive by 9:45 AM)   New Visit with Tatyana Antoine [facility] ([facility])    [address] [id number] [phone]            Jul 17, 2017  8:45 AM CDT   (Arrive by 8:30 AM)   PHYSICAL with Favian Recinos MD   [facility] ([facility])    [address] [facility] [address]   [phone]              Further instructions from your care team       -Continue to follow carbohydrate counting meal plan.   -Try to add some routine exercise - exercise goal is 30 minutes most days of the week.   -Test glucose fasting and 2 hours after supper meal.  -Target levels are  "fasting , 2 hours after supper less than 180.   -Plan is to increase Metformin to 500 mg 3x/day.   -Weight today was 240# which is down 15# from initial visit.  -Follow up in 1 month.   -Call with any questions - Madison Villarreal [phone]     Pending Results     No orders found from 5/1/2017 to 5/4/2017.            Admission Information     Date & Time Provider Department Dept. Phone    5/3/2017 Favian Recinos MD [facility] [phone]      Your Vitals Were     Blood Pressure Pulse Height Weight Pulse Oximetry BMI (Body Mass Index)    140/79 (BP Location: Left arm, Patient Position: Chair, Cuff Size: Adult Large) 97 1.549 m (5' 1\") 109.2 kg (240 lb 12.8 oz) 97% 45.5 kg/m2      [facility] Information     [employer] gives you secure access to your electronic health record. If you see a primary care provider, you can also send messages to your care team and make appointments. If you have questions, please call your primary care clinic.  If you do not have a primary care provider, please call [phone] and they will assist you.        Care EveryWhere ID     This is your Care EveryWhere ID. This could be used by other organizations to access your [facility] medical records  [id number]           Review of your medicines      UNREVIEWED medicines. Ask your doctor about these medicines        Dose / Directions    ADVIL PO        Dose:  600 mg   Take 600 mg by mouth every 8 hours as needed for moderate pain   Refills:  0       albuterol 108 (90 BASE) MCG/ACT Inhaler   Commonly known as:  albuterol        Dose:  2 puff   Inhale 2 puffs into the lungs every 4 hours as needed for shortness of breath / dyspnea   Quantity:  1 Inhaler   Refills:  0       aspirin 81 MG EC tablet   Used for:  Controlled type 2 diabetes mellitus without complication, without long-term current use of insulin (H)        Dose:  81 mg   Take 1 tablet (81 mg) by mouth daily   Quantity:  90 tablet   Refills:  3       atorvastatin 40 " MG tablet   Commonly known as:  LIPITOR   Used for:  Controlled type 2 diabetes mellitus without complication, without long-term current use of insulin (H)        Dose:  40 mg   Take 1 tablet (40 mg) by mouth daily   Quantity:  90 tablet   Refills:  3       FLUoxetine 20 MG capsule   Commonly known as:  PROzac   Used for:  Other depression        Dose:  20 mg   Take 1 capsule (20 mg) by mouth daily   Quantity:  90 capsule   Refills:  1       hydrochlorothiazide 25 MG tablet   Commonly known as:  HYDRODIURIL   Used for:  HTN (hypertension)        Dose:  25 mg   Take 1 tablet (25 mg) by mouth daily   Quantity:  30 tablet   Refills:  2       Ipratropium-Albuterol  MCG/ACT inhaler   Commonly known as:  COMBIVENT RESPIMAT   Used for:  Other emphysema (H)        Dose:  1 puff   Inhale 1 puff into the lungs 4 times daily as needed for shortness of breath / dyspnea or wheezing Not to exceed 6 doses per day.   Quantity:  1 Inhaler   Refills:  1       lisinopril 10 MG tablet   Commonly known as:  PRINIVIL/ZESTRIL   Used for:  Benign essential hypertension        Dose:  10 mg   Take 1 tablet (10 mg) by mouth daily   Quantity:  30 tablet   Refills:  1       loratadine 10 MG tablet   Commonly known as:  CLARITIN        Dose:  10 mg   Take 10 mg by mouth daily.   Refills:  0       metFORMIN 500 MG tablet   Commonly known as:  GLUCOPHAGE   Used for:  Type 2 diabetes mellitus without complication, without long-term current use of insulin (H)        TAKE 1 TABLET(500 MG) BY MOUTH TWICE DAILY WITH MEALS   Quantity:  60 tablet   Refills:  3       PEPCID PO        Dose:  10 mg   Take 10 mg by mouth daily pepcid complete chewable   Refills:  0         CONTINUE these medicines which have NOT CHANGED        Dose / Directions    blood glucose monitoring test strip   Commonly known as:  ONE TOUCH VERIO IQ   Used for:  Type 2 diabetes mellitus with hyperglycemia, without long-term current use of insulin (H)        Use to test blood  sugar 2 times daily.   Quantity:  100 each   Refills:  10                Protect others around you: Learn how to safely use, store and throw away your medicines at www.disposemymeds.org.             Medication List: This is a list of all your medications and when to take them. Check marks below indicate your daily home schedule. Keep this list as a reference.      Medications           Morning Afternoon Evening Bedtime As Needed    ADVIL PO   Take 600 mg by mouth every 8 hours as needed for moderate pain                                albuterol 108 (90 BASE) MCG/ACT Inhaler   Commonly known as:  albuterol   Inhale 2 puffs into the lungs every 4 hours as needed for shortness of breath / dyspnea                                aspirin 81 MG EC tablet   Take 1 tablet (81 mg) by mouth daily                                atorvastatin 40 MG tablet   Commonly known as:  LIPITOR   Take 1 tablet (40 mg) by mouth daily                                blood glucose monitoring test strip   Commonly known as:  ONE TOUCH VERIO IQ   Use to test blood sugar 2 times daily.                                FLUoxetine 20 MG capsule   Commonly known as:  PROzac   Take 1 capsule (20 mg) by mouth daily                                hydrochlorothiazide 25 MG tablet   Commonly known as:  HYDRODIURIL   Take 1 tablet (25 mg) by mouth daily                                Ipratropium-Albuterol  MCG/ACT inhaler   Commonly known as:  COMBIVENT RESPIMAT   Inhale 1 puff into the lungs 4 times daily as needed for shortness of breath / dyspnea or wheezing Not to exceed 6 doses per day.                                lisinopril 10 MG tablet   Commonly known as:  PRINIVIL/ZESTRIL   Take 1 tablet (10 mg) by mouth daily                                loratadine 10 MG tablet   Commonly known as:  CLARITIN   Take 10 mg by mouth daily.                                metFORMIN 500 MG tablet   Commonly known as:  GLUCOPHAGE   TAKE 1 TABLET(500 MG) BY  MOUTH TWICE DAILY WITH MEALS                                PEPCID PO   Take 10 mg by mouth daily pepcid complete chewable

## 2017-05-03 NOTE — IP AVS SNAPSHOT
[facility]    [address] [place] [id number]    Phone:  [phone]    Fax:  [phone]                                       After Visit Summary   5/3/2017    Sola Gan    MRN: [id number]           After Visit Summary Signature Page     I have received my discharge instructions, and my questions have been answered. I have discussed any challenges I see with this plan with the nurse or doctor.    ..........................................................................................................................................  Patient/Patient Representative Signature      ..........................................................................................................................................  Patient Representative Print Name and Relationship to Patient    ..................................................               ................................................  Date                                            Time    ..........................................................................................................................................  Reviewed by Signature/Title    ...................................................              ..............................................  Date                                                            Time

## 2017-05-03 NOTE — NURSING NOTE
"Chief Complaint   Patient presents with     Diabetes     session 3       Initial /79 (BP Location: Left arm, Patient Position: Chair, Cuff Size: Adult Large)  Pulse 97  Ht 1.549 m (5' 1\")  Wt 109.2 kg (240 lb 12.8 oz)  SpO2 97%  BMI 45.5 kg/m2 Estimated body mass index is 45.5 kg/(m^2) as calculated from the following:    Height as of this encounter: 1.549 m (5' 1\").    Weight as of this encounter: 109.2 kg (240 lb 12.8 oz).  Medication Reconciliation: complete   Tia Virgen      "

## 2017-05-04 NOTE — PROGRESS NOTES
"Sola is here for session 3.    BG readings as follows: fastin-145 (average 131) and post-supper: 103-163, 224    Blood pressure 140/79, pulse 97, height 1.549 m (5' 1\"), weight 109.2 kg (240 lb 12.8 oz), SpO2 97 %.    Current diabetes medications: Metformin 500 mg bid    Readiness to learn: eager    Barriers to learning: none    Pt actively participated and demonstrated adequate understanding of topics discussed.     Topics covered: BG self-test and A1c connection, evaluating records to better understand highs and lows, how diabetes and therapies will change over time, expectations of future primary care visits, prevention of complications, foot care skills.    Cherelle Gaytan, JEFF, LD, discussed with Sola:importance of choosing unsaturated fats, blood pressure and lipid targets,  and heart healthy guidelines. Sola also discussed with Cherelle her concerns regarding food choices when going home to [place] for a wedding next month.      Plan: Increase Metformin to 500 mg with meals. Sola prefers to spread it out throughout the day.    Follow up: Session 4 when she returns from [place]    Total time spent with patient: 70 minutes.      "

## 2017-05-18 ENCOUNTER — TELEPHONE (OUTPATIENT)
Dept: FAMILY MEDICINE | Facility: OTHER | Age: 67
End: 2017-05-18

## 2017-05-18 PROBLEM — E11.9 CONTROLLED TYPE 2 DIABETES MELLITUS WITHOUT COMPLICATION, WITHOUT LONG-TERM CURRENT USE OF INSULIN (H): Status: ACTIVE | Noted: 2017-05-18

## 2017-05-18 NOTE — TELEPHONE ENCOUNTER
Pt calling stating that she is going out of town next week some time and is requesting a letter stating your conditions, medications and supplies for boarding airplane. Please call her back if any questions of if not needed. Thank you.

## 2017-05-18 NOTE — LETTER
May 18, 2017      Sola Gan  [address] [facility] [address]              To Whom it may concern:      Sola is under my care for Type 2 Diabetes, COPD, Obstructive Sleep Apnea, Asthma, Seasonal allergies, Hypertension, and Depression. Sola will need to travel with her Diabetic testing supplies and meter as well as her Cpap machine.    Sola's medication are listed below:     Generic Name Brand name Strength      Albuterol Sulfate PROAIR  (90 BASE) MCG/ACT Inhale 2 puffs into the lungs every 4 hours as needed for shortness of breath / dyspnea          Aspirin aspirin 81 MG Take 1 tablet (81 mg) by mouth daily          Atorvastatin Calcium LIPITOR 40 MG Take 1 tablet (40 mg) by mouth daily          Famotidine   Take 10 mg by mouth daily pepcid complete chewable          FLUoxetine HCl PROzac 20 MG Take 1 capsule (20 mg) by mouth daily          Glucose Blood no brand specified  Use to test blood sugar 2 times daily.          HydroCHLOROthiazide HYDRODIURIL 25 MG Take 1 tablet (25 mg) by mouth daily          Ibuprofen   Take 600 mg by mouth every 8 hours as needed for moderate pain          Ipratropium-Albuterol COMBIVENT RESPIMAT  MCG/ACT Inhale 1 puff into the lungs 4 times daily as needed for shortness of breath / dyspnea or wheezing Not to exceed 6 doses per day.          Lisinopril PRINIVIL/ZESTRIL 10 MG Take 1 tablet (10 mg) by mouth daily          Loratadine CLARITIN 10 MG Take 10 mg by mouth daily.          MetFORMIN HCl GLUCOPHAGE 500 MG Take 1 tablet (500 mg) by mouth 3 times daily (with meals)                  Sincerely,        Dr JULIET Recinos

## 2017-05-22 ENCOUNTER — OFFICE VISIT (OUTPATIENT)
Dept: SURGERY | Facility: OTHER | Age: 67
End: 2017-05-22
Attending: SURGERY
Payer: COMMERCIAL

## 2017-05-22 VITALS
TEMPERATURE: 98.3 F | WEIGHT: 236 LBS | HEART RATE: 89 BPM | SYSTOLIC BLOOD PRESSURE: 136 MMHG | DIASTOLIC BLOOD PRESSURE: 72 MMHG | BODY MASS INDEX: 44.56 KG/M2 | OXYGEN SATURATION: 98 % | HEIGHT: 61 IN

## 2017-05-22 DIAGNOSIS — Z12.11 SPECIAL SCREENING FOR MALIGNANT NEOPLASMS, COLON: Primary | ICD-10-CM

## 2017-05-22 DIAGNOSIS — Z01.818 PREOPERATIVE EXAMINATION: ICD-10-CM

## 2017-05-22 DIAGNOSIS — E66.01 MORBID OBESITY DUE TO EXCESS CALORIES (H): ICD-10-CM

## 2017-05-22 PROCEDURE — 93005 ELECTROCARDIOGRAM TRACING: CPT | Performed by: INTERNAL MEDICINE

## 2017-05-22 PROCEDURE — 99243 OFF/OP CNSLTJ NEW/EST LOW 30: CPT | Performed by: SURGERY

## 2017-05-22 PROCEDURE — 93010 ELECTROCARDIOGRAM REPORT: CPT | Performed by: INTERNAL MEDICINE

## 2017-05-22 PROCEDURE — 99212 OFFICE O/P EST SF 10 MIN: CPT | Performed by: SURGERY

## 2017-05-22 ASSESSMENT — PAIN SCALES - GENERAL: PAINLEVEL: NO PAIN (0)

## 2017-05-22 NOTE — NURSING NOTE
"Chief Complaint   Patient presents with     Consult     Colonoscopy consult.  Dr Recinos is referring.  Never had a colonoscopy.         Initial /72 (BP Location: Left arm, Patient Position: Chair, Cuff Size: Adult Regular)  Pulse 89  Temp 98.3  F (36.8  C) (Tympanic)  Ht 5' 1\" (1.549 m)  Wt 236 lb (107 kg)  SpO2 98%  BMI 44.59 kg/m2 Estimated body mass index is 44.59 kg/(m^2) as calculated from the following:    Height as of this encounter: 5' 1\" (1.549 m).    Weight as of this encounter: 236 lb (107 kg).  Medication Reconciliation: complete   SINGH MEEK      "

## 2017-05-22 NOTE — PATIENT INSTRUCTIONS
Thank you for allowing Dr. Coreas and our surgical team to participate in your care.  If you have a scheduling or an appointment question please contact El[facility] Unit Coordinator at her direct line [phone].   ALL nursing questions or concerns can be directed to Jenny at: [phone]     You are scheduled for a:  colonoscopy  Your procedure date is: 6/21/17    You need a friend or family member available to drive you home AND stay with you for 24 hours after you leave the hospital. You will not be allowed to drive yourself. IF you need to take a taxi or the bus you MUST have a responsible person to ride with you. YOUR PROCEDURE WILL BE CANCELLED IF YOU DO NOT HAVE A RESPONSIBLE ADULT TO DRIVE YOU HOME.       You CANNOT have anything to eat or drink after midnight the night before your surgery, ncluding water and coffee. Your stomach needs to be completely empty. Do NOT chew gum, suck on hard candy, or smoke. You can brush your teeth the morning of surgery.       You need to call our Surgery Education Nurses 1-2 weeks prior to your surgery date at  [phone] or toll free [phone]. Please have you medication and allergy lists ready.      Stop your aspirin or other NSAIDs(Ibuprofen, Motrin, Aleve, Celebrex, Naproxen, etc...) 7 days before your surgery.      Hospital admitting will call you the day before your surgery with your arrival time. If you are scheduled on a Monday admitting will call you the Friday before.      Please call your primary care physician if you should become ill within 24 hours of scheduled surgery. (ex.vomiting, diarrhea, fever)    Surgery Education will contact you the day before your procedure between the hours of noon and 5 pm with the time you need to register in admitting at the hospital. Call Jenny with any questions [phone]\    You are going to hold your metformin on the day you are prepping (6/20/17) and the morning of your procedure. You may resume all  your medications like normal once you return home from your procedure.

## 2017-05-22 NOTE — PROGRESS NOTES
Surgery Consult Clinic Note      RE: Sola Gan  : 1950  MARILIN: 2017      Chief Complaint:  Screening colonoscopy    History of Present Illness:  Mrs. Gan is a very pleasant 66 year old year old female who I am seeing at the request of Dr. Recinos for evaluation of screening colon malignant neoplasm and consideration for colonoscopy.  She denies family history of colon or rectal cancer, blood in stool, changes in bowel habits, weight loss, abdominal pain.  No previous colonoscopies.  History of appendectomy and cholecystectomy.  She specifically denies fever, chills, nausea, vomiting, chest pain, shortness of breath or palpitations.      Medical history:  Past Medical History:   Diagnosis Date     H/O sleep apnea     tonsils, adnoids, and uvula removed   DM    Surgical history:  Past Surgical History:   Procedure Laterality Date     APPENDECTOMY       CHOLECYSTECTOMY       ENT SURGERY      for MORA     GYN SURGERY       HERNIA REPAIR         Family history:  Family History   Problem Relation Age of Onset     Other Cancer Mother      ovarian      Breast Cancer Mother      Bronchitis Father      Peripheral Neuropathy Father      Hypertension Father        Medications:  Current Outpatient Prescriptions   Medication Sig Dispense Refill     metFORMIN (GLUCOPHAGE) 500 MG tablet Take 1 tablet (500 mg) by mouth 3 times daily (with meals) 90 tablet 3     aspirin 81 MG EC tablet Take 1 tablet (81 mg) by mouth daily 90 tablet 3     atorvastatin (LIPITOR) 40 MG tablet Take 1 tablet (40 mg) by mouth daily 90 tablet 3     FLUoxetine (PROZAC) 20 MG capsule Take 1 capsule (20 mg) by mouth daily 90 capsule 1     Ipratropium-Albuterol (COMBIVENT RESPIMAT)  MCG/ACT inhaler Inhale 1 puff into the lungs 4 times daily as needed for shortness of breath / dyspnea or wheezing Not to exceed 6 doses per day. 1 Inhaler 1     lisinopril (PRINIVIL/ZESTRIL) 10 MG tablet Take 1 tablet (10 mg) by mouth daily 30 tablet 1      blood glucose monitoring (ONE TOUCH VERIO IQ) test strip Use to test blood sugar 2 times daily. 100 each 10     Ibuprofen (ADVIL PO) Take 600 mg by mouth every 8 hours as needed for moderate pain       albuterol (ALBUTEROL) 108 (90 BASE) MCG/ACT Inhaler Inhale 2 puffs into the lungs every 4 hours as needed for shortness of breath / dyspnea 1 Inhaler 0     hydrochlorothiazide (HYDRODIURIL) 25 MG tablet Take 1 tablet (25 mg) by mouth daily 30 tablet 2     Famotidine (PEPCID PO) Take 10 mg by mouth daily pepcid complete chewable       loratadine (CLARITIN) 10 MG tablet Take 10 mg by mouth daily.       Allergies:  The patientis allergic to acyclovir and sulfa drugs.  .  Social history:  Social History   Substance Use Topics     Smoking status: Current Every Day Smoker     Packs/day: 0.50     Years: 20.00     Smokeless tobacco: Never Used     Alcohol use No     Marital status: single.    Review of Systems:    Constitutional: Negative for fever, chills and weight loss.   HENT: Negative for ear pain, nosebleeds, congestion, sore throat, tinnitus and ear discharge.    Eyes: Negative for blurred vision, double vision, photophobia and pain.   Respiratory: Negative for cough, hemoptysis, shortness of breath, wheezing and stridor.    Cardiovascular: Negative for chest pain, palpitations and orthopnea.   Gastrointestinal: Negative for heartburn, nausea, vomiting, abdominal pain and blood in stool.   Genitourinary: Negative for urgency, frequency and hematuria.   Musculoskeletal: Negative for myalgias, back pain and joint pain.   Neurological: Negative for tingling, speech change and headaches.   Endo/Heme/Allergies: Does not bruise/bleed easily.   Psychiatric/Behavioral: Negative for depression, suicidal ideas and hallucinations. The patient is not nervous/anxious.    Physical Examination:  /72 (BP Location: Left arm, Patient Position: Chair, Cuff Size: Adult Regular)  Pulse 89  Temp 98.3  F (36.8  C) (Tympanic)  Ht  "1.549 m (5' 1\")  Wt 107 kg (236 lb)  SpO2 98%  BMI 44.59 kg/m2  General: AAOx4, NAD, WN/WD, ambulating without assistance  HEENT:NCAT, EOMI, PERRL Sclerae anicteric; Trachea mideline, no JVD  Chest:   Clear to auscultation bilaterally.  Cardiac: S1S2 , regular rate and rhythm without additional sounds  Abdomen: Obese, soft, ND/NT no rebound, no guarding  Extremities: Cursory exam unremarkable.  Skin: Warm, dry, < 2 sec cap refill  Neuro: CN 2-12 grossly intact, no focal deficit, GCS 15  Psych: happy, calm, asks appropriate questions      Assessment/Plan:  #1 Screening colon malignant neoplasm  #2 Morbid obesity  #3 Tobacco dependence  #4 MORA  #6 DM II without insulin    Thank you for the consult.  Mrs. Gan and REYES had a long and sissy discussion about colonoscopies.  The indications, risks, benefits, althernatives and technical aspects of whole colon colonoscopy were outlined with risks including, but not limited to, perforation, bleeding and inability to visualize entire colon.  Management of each was reviewed including the risk for life saving surgery and possible admittance to the ICU.  The need of mechanical preparation of the colon was reviewed along with the use of monitored anesthetic care which is needed to ensure proper visualization and safety concerns should biopsy be needed.  The patient's questions were asked and answered.  Scheduled first available date.          Dr Coreas  [facility]  [address], [address]  [place]    [address]    Referring Provider:  Favian Recinos MD  [place] [id number]     Primary Care Provider:  Favian Recinos  "

## 2017-05-22 NOTE — MR AVS SNAPSHOT
After Visit Summary   5/22/2017    Sola Gan    MRN: [id number]           Patient Information     Date Of Birth          1950        Visit Information        Provider Department      5/22/2017 10:00 AM Rigo Coreas, DO [facility]        Today's Diagnoses     Special screening for malignant neoplasms, colon    -  1    Morbid obesity due to excess calories (H)        Preoperative examination          Care Instructions          Thank you for allowing Dr. Coreas and our surgical team to participate in your care.  If you have a scheduling or an appointment question please contact [facility] Unit Coordinator at her direct line [phone].   ALL nursing questions or concerns can be directed to Jenny at: [phone]     You are scheduled for a:  colonoscopy  Your procedure date is: 6/21/17    You need a friend or family member available to drive you home AND stay with you for 24 hours after you leave the hospital. You will not be allowed to drive yourself. IF you need to take a taxi or the bus you MUST have a responsible person to ride with you. YOUR PROCEDURE WILL BE CANCELLED IF YOU DO NOT HAVE A RESPONSIBLE ADULT TO DRIVE YOU HOME.       You CANNOT have anything to eat or drink after midnight the night before your surgery, ncluding water and coffee. Your stomach needs to be completely empty. Do NOT chew gum, suck on hard candy, or smoke. You can brush your teeth the morning of surgery.       You need to call our Surgery Education Nurses 1-2 weeks prior to your surgery date at  [phone] or toll free [phone]. Please have you medication and allergy lists ready.      Stop your aspirin or other NSAIDs(Ibuprofen, Motrin, Aleve, Celebrex, Naproxen, etc...) 7 days before your surgery.      Hospital admitting will call you the day before your surgery with your arrival time. If you are scheduled on a Monday admitting will call you the Friday before.      Please  call your primary care physician if you should become ill within 24 hours of scheduled surgery. (ex.vomiting, diarrhea, fever)    Surgery Education will contact you the day before your procedure between the hours of noon and 5 pm with the time you need to register in admitting at the hospital. Call Jenny with any questions [phone]\    You are going to hold your metformin on the day you are prepping (6/20/17) and the morning of your procedure. You may resume all your medications like normal once you return home from your procedure.          Follow-ups after your visit        Your next 10 appointments already scheduled     May 31, 2017 10:00 AM CDT   (Arrive by 9:45 AM)   New Visit with Tatyana Antoine [facility] ([facility])    [address] [address] [phone]            Jun 26, 2017 11:00 AM CDT   (Arrive by 10:45 AM)   Return Visit with Madison Zambrano RN   [facility] ([facility] )    [address] [place] [address] [phone]            Jul 17, 2017  8:45 AM CDT   (Arrive by 8:30 AM)   PHYSICAL with Favian Recinos MD   [facility] ([facility])    [address] [place] [address] [phone]              Who to contact     If you have questions or need follow up information about today's clinic visit or your schedule please contact [facility] directly at [phone].  Normal or non-critical lab and imaging results will be communicated to you by MyChart, letter or phone within 4 business days after the clinic has received the results. If you do not hear from us within 7 days, please contact the clinic through MyChart or phone. If you have a critical or abnormal lab result, we will notify you by phone as soon as possible.  Submit refill requests through [employer] or call your pharmacy and they will forward the refill request to us. Please allow 3 business days for your refill  "to be completed.          Additional Information About Your Visit        [employer] Information     [employer] gives you secure access to your electronic health record. If you see a primary care provider, you can also send messages to your care team and make appointments. If you have questions, please call your primary care clinic.  If you do not have a primary care provider, please call [phone] and they will assist you.        Care EveryWhere ID     This is your Care EveryWhere ID. This could be used by other organizations to access your [facility] medical records  [id number]        Your Vitals Were     Pulse Temperature Height Pulse Oximetry BMI (Body Mass Index)       89 98.3  F (36.8  C) (Tympanic) 5' 1\" (1.549 m) 98% 44.59 kg/m2        Blood Pressure from Last 3 Encounters:   05/22/17 136/72   05/03/17 140/79   04/13/17 126/84    Weight from Last 3 Encounters:   05/22/17 236 lb (107 kg)   05/03/17 240 lb 12.8 oz (109.2 kg)   04/13/17 247 lb (112 kg)              Today, you had the following     No orders found for display         Today's Medication Changes          These changes are accurate as of: 5/22/17 11:09 AM.  If you have any questions, ask your nurse or doctor.               Stop taking these medicines if you haven't already. Please contact your care team if you have questions.     hydrochlorothiazide 25 MG tablet   Commonly known as:  HYDRODIURIL   Stopped by:  Rigo Coreas, DO                    Primary Care Provider Office Phone # Fax #    Favian Recinos -[phone]       [address] [address]        Thank you!     Thank you for choosing [facility]  for your care. Our goal is always to provide you with excellent care. Hearing back from our patients is one way we can continue to improve our services. Please take a few minutes to complete the written survey that you may receive in the mail after your visit with us. Thank you!   "           Your Updated Medication List - Protect others around you: Learn how to safely use, store and throw away your medicines at www.disposemymeds.org.          This list is accurate as of: 5/22/17 11:09 AM.  Always use your most recent med list.                   Brand Name Dispense Instructions for use    ADVIL PO      Take 600 mg by mouth every 8 hours as needed for moderate pain       albuterol 108 (90 BASE) MCG/ACT Inhaler    albuterol    1 Inhaler    Inhale 2 puffs into the lungs every 4 hours as needed for shortness of breath / dyspnea       aspirin 81 MG EC tablet     90 tablet    Take 1 tablet (81 mg) by mouth daily       atorvastatin 40 MG tablet    LIPITOR    90 tablet    Take 1 tablet (40 mg) by mouth daily       blood glucose monitoring test strip    ONE TOUCH VERIO IQ    100 each    Use to test blood sugar 2 times daily.       FLUoxetine 20 MG capsule    PROzac    90 capsule    Take 1 capsule (20 mg) by mouth daily       Ipratropium-Albuterol  MCG/ACT inhaler    COMBIVENT RESPIMAT    1 Inhaler    Inhale 1 puff into the lungs 4 times daily as needed for shortness of breath / dyspnea or wheezing Not to exceed 6 doses per day.       lisinopril 10 MG tablet    PRINIVIL/ZESTRIL    30 tablet    Take 1 tablet (10 mg) by mouth daily       loratadine 10 MG tablet    CLARITIN     Take 10 mg by mouth daily.       metFORMIN 500 MG tablet    GLUCOPHAGE    90 tablet    Take 1 tablet (500 mg) by mouth 3 times daily (with meals)       PEPCID PO      Take 10 mg by mouth daily pepcid complete chewable

## 2017-05-25 DIAGNOSIS — I10 BENIGN ESSENTIAL HYPERTENSION: ICD-10-CM

## 2017-05-25 RX ORDER — LISINOPRIL 10 MG/1
10 TABLET ORAL DAILY
Qty: 30 TABLET | Refills: 8 | Status: SHIPPED | OUTPATIENT
Start: 2017-05-25 | End: 2018-03-15

## 2017-05-25 NOTE — TELEPHONE ENCOUNTER
lisinopril      Last Written Prescription Date: 3/28/17  Last Fill Quantity: 30, # refills: 1  Last Office Visit with [facility], [facility] or [facility] prescribing provider: 4/13/17  Next 5 appointments (look out 90 days)     Jun 26, 2017 11:00 AM CDT   (Arrive by 10:45 AM)   Return Visit with Madison Zambrano RN   [facility] ([facility] )    [address] [place] [address] [phone]            Jul 17, 2017  8:45 AM CDT   (Arrive by 8:30 AM)   PHYSICAL with Favian Recinos MD   [facility] ([facility])    [address] [facility] [address]   [phone]                   Potassium   Date Value Ref Range Status   04/13/2017 4.0 3.4 - 5.3 mmol/L Final     Creatinine   Date Value Ref Range Status   04/13/2017 0.72 0.52 - 1.04 mg/dL Final     BP Readings from Last 3 Encounters:   05/22/17 136/72   05/03/17 140/79   04/13/17 126/84

## 2017-05-30 DIAGNOSIS — J43.8 OTHER EMPHYSEMA (H): ICD-10-CM

## 2017-06-01 RX ORDER — IPRATROPIUM BROMIDE AND ALBUTEROL 20; 100 UG/1; UG/1
SPRAY, METERED RESPIRATORY (INHALATION)
Qty: 4 G | Refills: 0 | Status: SHIPPED | OUTPATIENT
Start: 2017-06-01 | End: 2017-07-04

## 2017-06-06 NOTE — H&P (VIEW-ONLY)
Surgery Consult Clinic Note      RE: Sola Gan  : 1950  MARILIN: 2017      Chief Complaint:  Screening colonoscopy    History of Present Illness:  Mrs. Gan is a very pleasant 66 year old year old female who I am seeing at the request of Dr. Recinos for evaluation of screening colon malignant neoplasm and consideration for colonoscopy.  She denies family history of colon or rectal cancer, blood in stool, changes in bowel habits, weight loss, abdominal pain.  No previous colonoscopies.  History of appendectomy and cholecystectomy.  She specifically denies fever, chills, nausea, vomiting, chest pain, shortness of breath or palpitations.      Medical history:  Past Medical History:   Diagnosis Date     H/O sleep apnea     tonsils, adnoids, and uvula removed   DM    Surgical history:  Past Surgical History:   Procedure Laterality Date     APPENDECTOMY       CHOLECYSTECTOMY       ENT SURGERY      for MORA     GYN SURGERY       HERNIA REPAIR         Family history:  Family History   Problem Relation Age of Onset     Other Cancer Mother      ovarian      Breast Cancer Mother      Bronchitis Father      Peripheral Neuropathy Father      Hypertension Father        Medications:  Current Outpatient Prescriptions   Medication Sig Dispense Refill     metFORMIN (GLUCOPHAGE) 500 MG tablet Take 1 tablet (500 mg) by mouth 3 times daily (with meals) 90 tablet 3     aspirin 81 MG EC tablet Take 1 tablet (81 mg) by mouth daily 90 tablet 3     atorvastatin (LIPITOR) 40 MG tablet Take 1 tablet (40 mg) by mouth daily 90 tablet 3     FLUoxetine (PROZAC) 20 MG capsule Take 1 capsule (20 mg) by mouth daily 90 capsule 1     Ipratropium-Albuterol (COMBIVENT RESPIMAT)  MCG/ACT inhaler Inhale 1 puff into the lungs 4 times daily as needed for shortness of breath / dyspnea or wheezing Not to exceed 6 doses per day. 1 Inhaler 1     lisinopril (PRINIVIL/ZESTRIL) 10 MG tablet Take 1 tablet (10 mg) by mouth daily 30 tablet 1      blood glucose monitoring (ONE TOUCH VERIO IQ) test strip Use to test blood sugar 2 times daily. 100 each 10     Ibuprofen (ADVIL PO) Take 600 mg by mouth every 8 hours as needed for moderate pain       albuterol (ALBUTEROL) 108 (90 BASE) MCG/ACT Inhaler Inhale 2 puffs into the lungs every 4 hours as needed for shortness of breath / dyspnea 1 Inhaler 0     hydrochlorothiazide (HYDRODIURIL) 25 MG tablet Take 1 tablet (25 mg) by mouth daily 30 tablet 2     Famotidine (PEPCID PO) Take 10 mg by mouth daily pepcid complete chewable       loratadine (CLARITIN) 10 MG tablet Take 10 mg by mouth daily.       Allergies:  The patientis allergic to acyclovir and sulfa drugs.  .  Social history:  Social History   Substance Use Topics     Smoking status: Current Every Day Smoker     Packs/day: 0.50     Years: 20.00     Smokeless tobacco: Never Used     Alcohol use No     Marital status: single.    Review of Systems:    Constitutional: Negative for fever, chills and weight loss.   HENT: Negative for ear pain, nosebleeds, congestion, sore throat, tinnitus and ear discharge.    Eyes: Negative for blurred vision, double vision, photophobia and pain.   Respiratory: Negative for cough, hemoptysis, shortness of breath, wheezing and stridor.    Cardiovascular: Negative for chest pain, palpitations and orthopnea.   Gastrointestinal: Negative for heartburn, nausea, vomiting, abdominal pain and blood in stool.   Genitourinary: Negative for urgency, frequency and hematuria.   Musculoskeletal: Negative for myalgias, back pain and joint pain.   Neurological: Negative for tingling, speech change and headaches.   Endo/Heme/Allergies: Does not bruise/bleed easily.   Psychiatric/Behavioral: Negative for depression, suicidal ideas and hallucinations. The patient is not nervous/anxious.    Physical Examination:  /72 (BP Location: Left arm, Patient Position: Chair, Cuff Size: Adult Regular)  Pulse 89  Temp 98.3  F (36.8  C) (Tympanic)  Ht  "1.549 m (5' 1\")  Wt 107 kg (236 lb)  SpO2 98%  BMI 44.59 kg/m2  General: AAOx4, NAD, WN/WD, ambulating without assistance  HEENT:NCAT, EOMI, PERRL Sclerae anicteric; Trachea mideline, no JVD  Chest:   Clear to auscultation bilaterally.  Cardiac: S1S2 , regular rate and rhythm without additional sounds  Abdomen: Obese, soft, ND/NT no rebound, no guarding  Extremities: Cursory exam unremarkable.  Skin: Warm, dry, < 2 sec cap refill  Neuro: CN 2-12 grossly intact, no focal deficit, GCS 15  Psych: happy, calm, asks appropriate questions      Assessment/Plan:  #1 Screening colon malignant neoplasm  #2 Morbid obesity  #3 Tobacco dependence  #4 MORA  #6 DM II without insulin    Thank you for the consult.  Mrs. Gan and REYES had a long and sissy discussion about colonoscopies.  The indications, risks, benefits, althernatives and technical aspects of whole colon colonoscopy were outlined with risks including, but not limited to, perforation, bleeding and inability to visualize entire colon.  Management of each was reviewed including the risk for life saving surgery and possible admittance to the ICU.  The need of mechanical preparation of the colon was reviewed along with the use of monitored anesthetic care which is needed to ensure proper visualization and safety concerns should biopsy be needed.  The patient's questions were asked and answered.  Scheduled first available date.          Dr Coreas  [facility]  [address], [address]  [place]    [address]    Referring Provider:  Favian Recinos MD  [place] [id number]     Primary Care Provider:  Favian Recinos  "

## 2017-06-19 ENCOUNTER — TELEPHONE (OUTPATIENT)
Dept: EDUCATION SERVICES | Facility: HOSPITAL | Age: 67
End: 2017-06-19

## 2017-06-19 NOTE — TELEPHONE ENCOUNTER
Called Sola. She wanted to discuss stopping her Metformin for her procedure. Spend some time reassuring her that her BG may go up some, but it should be ok for the short time period. She will be able to resume her Metformin post-procedure. Sola reports BG readings currently within target.

## 2017-06-19 NOTE — TELEPHONE ENCOUNTER
Patient is having a colonoscopy on Wednesday 6/21/17 is starting a clear liquid diet has questions for Madison.

## 2017-06-21 ENCOUNTER — ANESTHESIA EVENT (OUTPATIENT)
Dept: SURGERY | Facility: HOSPITAL | Age: 67
End: 2017-06-21
Payer: COMMERCIAL

## 2017-06-21 ENCOUNTER — ANESTHESIA (OUTPATIENT)
Dept: SURGERY | Facility: HOSPITAL | Age: 67
End: 2017-06-21
Payer: COMMERCIAL

## 2017-06-21 ENCOUNTER — SURGERY (OUTPATIENT)
Age: 67
End: 2017-06-21

## 2017-06-21 ENCOUNTER — HOSPITAL ENCOUNTER (OUTPATIENT)
Facility: HOSPITAL | Age: 67
Discharge: HOME | End: 2017-06-21
Attending: SURGERY | Admitting: SURGERY
Payer: COMMERCIAL

## 2017-06-21 VITALS
OXYGEN SATURATION: 93 % | DIASTOLIC BLOOD PRESSURE: 95 MMHG | TEMPERATURE: 98 F | BODY MASS INDEX: 44.02 KG/M2 | WEIGHT: 233 LBS | SYSTOLIC BLOOD PRESSURE: 151 MMHG | RESPIRATION RATE: 20 BRPM

## 2017-06-21 PROCEDURE — 25000125 ZZHC RX 250: Performed by: SURGERY

## 2017-06-21 PROCEDURE — 71000027 ZZH RECOVERY PHASE 2 EACH 15 MINS: Performed by: SURGERY

## 2017-06-21 PROCEDURE — 25000128 H RX IP 250 OP 636: Performed by: NURSE ANESTHETIST, CERTIFIED REGISTERED

## 2017-06-21 PROCEDURE — 36000050 ZZH SURGERY LEVEL 2 1ST 30 MIN: Performed by: SURGERY

## 2017-06-21 PROCEDURE — 88342 IMHCHEM/IMCYTCHM 1ST ANTB: CPT | Mod: TC | Performed by: SURGERY

## 2017-06-21 PROCEDURE — 37000009 ZZH ANESTHESIA TECHNICAL FEE, EACH ADDTL 15 MIN: Performed by: SURGERY

## 2017-06-21 PROCEDURE — 36000052 ZZH SURGERY LEVEL 2 EA 15 ADDTL MIN: Performed by: SURGERY

## 2017-06-21 PROCEDURE — 40000306 ZZH STATISTIC PRE PROC ASSESS II: Performed by: SURGERY

## 2017-06-21 PROCEDURE — 88341 IMHCHEM/IMCYTCHM EA ADD ANTB: CPT | Mod: TC | Performed by: SURGERY

## 2017-06-21 PROCEDURE — 25000125 ZZHC RX 250: Performed by: NURSE ANESTHETIST, CERTIFIED REGISTERED

## 2017-06-21 PROCEDURE — 88305 TISSUE EXAM BY PATHOLOGIST: CPT | Mod: TC | Performed by: SURGERY

## 2017-06-21 PROCEDURE — 37000008 ZZH ANESTHESIA TECHNICAL FEE, 1ST 30 MIN: Performed by: SURGERY

## 2017-06-21 PROCEDURE — 25000128 H RX IP 250 OP 636: Performed by: ANESTHESIOLOGY

## 2017-06-21 PROCEDURE — 45381 COLONOSCOPY SUBMUCOUS NJX: CPT | Mod: PT | Performed by: SURGERY

## 2017-06-21 PROCEDURE — 45385 COLONOSCOPY W/LESION REMOVAL: CPT | Mod: PT | Performed by: ANESTHESIOLOGY

## 2017-06-21 PROCEDURE — 01999 UNLISTED ANES PROCEDURE: CPT | Performed by: NURSE ANESTHETIST, CERTIFIED REGISTERED

## 2017-06-21 PROCEDURE — 45385 COLONOSCOPY W/LESION REMOVAL: CPT | Mod: PT | Performed by: SURGERY

## 2017-06-21 PROCEDURE — 27210794 ZZH OR GENERAL SUPPLY STERILE: Performed by: SURGERY

## 2017-06-21 PROCEDURE — 45380 COLONOSCOPY AND BIOPSY: CPT | Mod: PT | Performed by: SURGERY

## 2017-06-21 RX ORDER — PROMETHAZINE HYDROCHLORIDE 25 MG/ML
12.5 INJECTION, SOLUTION INTRAMUSCULAR; INTRAVENOUS
Status: DISCONTINUED | OUTPATIENT
Start: 2017-06-21 | End: 2017-06-21 | Stop reason: HOSPADM

## 2017-06-21 RX ORDER — ALBUTEROL SULFATE 0.83 MG/ML
2.5 SOLUTION RESPIRATORY (INHALATION) EVERY 4 HOURS PRN
Status: DISCONTINUED | OUTPATIENT
Start: 2017-06-21 | End: 2017-06-21 | Stop reason: HOSPADM

## 2017-06-21 RX ORDER — FENTANYL CITRATE 50 UG/ML
25-50 INJECTION, SOLUTION INTRAMUSCULAR; INTRAVENOUS
Status: DISCONTINUED | OUTPATIENT
Start: 2017-06-21 | End: 2017-06-21 | Stop reason: HOSPADM

## 2017-06-21 RX ORDER — DEXAMETHASONE SODIUM PHOSPHATE 4 MG/ML
4 INJECTION, SOLUTION INTRA-ARTICULAR; INTRALESIONAL; INTRAMUSCULAR; INTRAVENOUS; SOFT TISSUE EVERY 10 MIN PRN
Status: DISCONTINUED | OUTPATIENT
Start: 2017-06-21 | End: 2017-06-21 | Stop reason: HOSPADM

## 2017-06-21 RX ORDER — KETOROLAC TROMETHAMINE 30 MG/ML
30 INJECTION, SOLUTION INTRAMUSCULAR; INTRAVENOUS EVERY 6 HOURS PRN
Status: DISCONTINUED | OUTPATIENT
Start: 2017-06-21 | End: 2017-06-21 | Stop reason: HOSPADM

## 2017-06-21 RX ORDER — LABETALOL HYDROCHLORIDE 5 MG/ML
10 INJECTION, SOLUTION INTRAVENOUS
Status: DISCONTINUED | OUTPATIENT
Start: 2017-06-21 | End: 2017-06-21 | Stop reason: HOSPADM

## 2017-06-21 RX ORDER — LIDOCAINE HYDROCHLORIDE 20 MG/ML
INJECTION, SOLUTION INFILTRATION; PERINEURAL PRN
Status: DISCONTINUED | OUTPATIENT
Start: 2017-06-21 | End: 2017-06-21

## 2017-06-21 RX ORDER — FENTANYL CITRATE 50 UG/ML
INJECTION, SOLUTION INTRAMUSCULAR; INTRAVENOUS PRN
Status: DISCONTINUED | OUTPATIENT
Start: 2017-06-21 | End: 2017-06-21

## 2017-06-21 RX ORDER — ONDANSETRON 2 MG/ML
4 INJECTION INTRAMUSCULAR; INTRAVENOUS EVERY 30 MIN PRN
Status: DISCONTINUED | OUTPATIENT
Start: 2017-06-21 | End: 2017-06-21 | Stop reason: HOSPADM

## 2017-06-21 RX ORDER — HYDRALAZINE HYDROCHLORIDE 20 MG/ML
2.5-5 INJECTION INTRAMUSCULAR; INTRAVENOUS EVERY 10 MIN PRN
Status: DISCONTINUED | OUTPATIENT
Start: 2017-06-21 | End: 2017-06-21 | Stop reason: HOSPADM

## 2017-06-21 RX ORDER — SODIUM CHLORIDE, SODIUM LACTATE, POTASSIUM CHLORIDE, CALCIUM CHLORIDE 600; 310; 30; 20 MG/100ML; MG/100ML; MG/100ML; MG/100ML
INJECTION, SOLUTION INTRAVENOUS CONTINUOUS
Status: DISCONTINUED | OUTPATIENT
Start: 2017-06-21 | End: 2017-06-21 | Stop reason: HOSPADM

## 2017-06-21 RX ORDER — NALOXONE HYDROCHLORIDE 0.4 MG/ML
.1-.4 INJECTION, SOLUTION INTRAMUSCULAR; INTRAVENOUS; SUBCUTANEOUS
Status: DISCONTINUED | OUTPATIENT
Start: 2017-06-21 | End: 2017-06-21 | Stop reason: HOSPADM

## 2017-06-21 RX ORDER — PROPOFOL 10 MG/ML
INJECTION, EMULSION INTRAVENOUS PRN
Status: DISCONTINUED | OUTPATIENT
Start: 2017-06-21 | End: 2017-06-21

## 2017-06-21 RX ORDER — MEPERIDINE HYDROCHLORIDE 25 MG/ML
12.5 INJECTION INTRAMUSCULAR; INTRAVENOUS; SUBCUTANEOUS
Status: DISCONTINUED | OUTPATIENT
Start: 2017-06-21 | End: 2017-06-21 | Stop reason: HOSPADM

## 2017-06-21 RX ORDER — ONDANSETRON 4 MG/1
4 TABLET, ORALLY DISINTEGRATING ORAL EVERY 30 MIN PRN
Status: DISCONTINUED | OUTPATIENT
Start: 2017-06-21 | End: 2017-06-21 | Stop reason: HOSPADM

## 2017-06-21 RX ADMIN — PROPOFOL 20 MG: 10 INJECTION, EMULSION INTRAVENOUS at 13:53

## 2017-06-21 RX ADMIN — Medication 3 ML: at 14:12

## 2017-06-21 RX ADMIN — PROPOFOL 50 MG: 10 INJECTION, EMULSION INTRAVENOUS at 13:41

## 2017-06-21 RX ADMIN — PROPOFOL 20 MG: 10 INJECTION, EMULSION INTRAVENOUS at 13:51

## 2017-06-21 RX ADMIN — PROPOFOL 20 MG: 10 INJECTION, EMULSION INTRAVENOUS at 13:45

## 2017-06-21 RX ADMIN — PROPOFOL 20 MG: 10 INJECTION, EMULSION INTRAVENOUS at 13:43

## 2017-06-21 RX ADMIN — PROPOFOL 20 MG: 10 INJECTION, EMULSION INTRAVENOUS at 13:49

## 2017-06-21 RX ADMIN — PROPOFOL 20 MG: 10 INJECTION, EMULSION INTRAVENOUS at 13:47

## 2017-06-21 RX ADMIN — FENTANYL CITRATE 50 MCG: 50 INJECTION, SOLUTION INTRAMUSCULAR; INTRAVENOUS at 13:38

## 2017-06-21 RX ADMIN — PROPOFOL 20 MG: 10 INJECTION, EMULSION INTRAVENOUS at 13:57

## 2017-06-21 RX ADMIN — PROPOFOL 10 MG: 10 INJECTION, EMULSION INTRAVENOUS at 14:09

## 2017-06-21 RX ADMIN — FENTANYL CITRATE 50 MCG: 50 INJECTION, SOLUTION INTRAMUSCULAR; INTRAVENOUS at 13:43

## 2017-06-21 RX ADMIN — PROPOFOL 20 MG: 10 INJECTION, EMULSION INTRAVENOUS at 13:59

## 2017-06-21 RX ADMIN — PROPOFOL 20 MG: 10 INJECTION, EMULSION INTRAVENOUS at 13:44

## 2017-06-21 RX ADMIN — PROPOFOL 10 MG: 10 INJECTION, EMULSION INTRAVENOUS at 14:05

## 2017-06-21 RX ADMIN — PROPOFOL 20 MG: 10 INJECTION, EMULSION INTRAVENOUS at 13:55

## 2017-06-21 RX ADMIN — PROPOFOL 10 MG: 10 INJECTION, EMULSION INTRAVENOUS at 14:01

## 2017-06-21 RX ADMIN — LIDOCAINE HYDROCHLORIDE 40 MG: 20 INJECTION, SOLUTION INFILTRATION; PERINEURAL at 13:41

## 2017-06-21 RX ADMIN — MIDAZOLAM HYDROCHLORIDE 2 MG: 1 INJECTION, SOLUTION INTRAMUSCULAR; INTRAVENOUS at 13:38

## 2017-06-21 RX ADMIN — SODIUM CHLORIDE, POTASSIUM CHLORIDE, SODIUM LACTATE AND CALCIUM CHLORIDE: 600; 310; 30; 20 INJECTION, SOLUTION INTRAVENOUS at 12:34

## 2017-06-21 RX ADMIN — PROPOFOL 20 MG: 10 INJECTION, EMULSION INTRAVENOUS at 13:42

## 2017-06-21 RX ADMIN — PROPOFOL 10 MG: 10 INJECTION, EMULSION INTRAVENOUS at 14:07

## 2017-06-21 RX ADMIN — PROPOFOL 10 MG: 10 INJECTION, EMULSION INTRAVENOUS at 14:03

## 2017-06-21 ASSESSMENT — COPD QUESTIONNAIRES: COPD: 1

## 2017-06-21 ASSESSMENT — LIFESTYLE VARIABLES: TOBACCO_USE: 1

## 2017-06-21 NOTE — ANESTHESIA PREPROCEDURE EVALUATION
Anesthesia Evaluation     . Pt has had prior anesthetic.     No history of anesthetic complications          ROS/MED HX    ENT/Pulmonary:     (+)sleep apnea, MORA risk factors (BMI: 44.69) obese, allergic rhinitis, other ENT- s/p UPPP, tobacco use, Current use <0.75 PPD packs/day  asthma COPD, doesn't use CPAP , . .    Neurologic:  - neg neurologic ROS     Cardiovascular:     (+) Dyslipidemia, hypertension-range: not on beta blocker, ---. : . . . :. . Previous cardiac testing date:results:date: results:ECG reviewed date:5/22/2017 results:NSR@81, OWN date: results:          METS/Exercise Tolerance:     Hematologic:  - neg hematologic  ROS       Musculoskeletal:   (+) arthritis, , , -       GI/Hepatic:     (+) GERD bowel prep,       Renal/Genitourinary:  - ROS Renal section negative       Endo:     (+) type II DM Last HgA1c: 11.4 date: 3/2017 Normal glucose range: 144 Obesity, .      Psychiatric:  - neg psychiatric ROS       Infectious Disease:  - neg infectious disease ROS       Malignancy:      - no malignancy   Other:    - neg other ROS                 Physical Exam  Normal systems: cardiovascular    Airway   Mallampati: III  TM distance: <3 FB  Neck ROM: full    Dental   (+) chipped and missing    Cardiovascular   Rhythm and rate: regular and normal      Pulmonary (+) decreased breath sounds                       Anesthesia Plan      History & Physical Review  History and physical reviewed and following examination; no interval change.    ASA Status:  3 .    NPO Status:  > 8 hours    Plan for MAC with Intravenous and Propofol induction. Maintenance will be TIVA.  Reason for MAC:  Chronic cardiopulmonary disease (G9), Difficulty with conscious sedation (QS) and Other - see comments  PONV prophylaxis:  Ondansetron (or other 5HT-3)  Surgeon requests deep sedation. Patient is an ASA 3 and has a BMI >40. Will provide MAC.      Postoperative Care  Postoperative pain management:  IV analgesics.      Consents  Anesthetic  plan, risks, benefits and alternatives discussed with:  Patient..                          .

## 2017-06-21 NOTE — BRIEF OP NOTE
[facility] - Brief Operative Note    Pre-operative diagnosis: Screening colon malignant neoplasm   Post-operative diagnosis Cecal polyp, sigmoid diverticulosis, sigmoid mass   Procedure: Colonoscopy, polypectomy, biopsy, tattoo   Surgeon: Rigo Coreas DO   Anesthesia: Monitor Anesthesia Care    Estimated blood loss: 0   Blood transfusion: No transfusion was given during surgery   Drains: 0   Specimens: Cecal polyp, sigmoid biopsies   Findings: Medium pedunculated mass in cecum, medium fungating mass at 30 cm in sigmoid colon (tattooed)   Complications: None   Condition: Stable   Comments: Details included in dictated operative note.

## 2017-06-21 NOTE — IP AVS SNAPSHOT
MRN:[id number]                      After Visit Summary   6/21/2017    Sola Gan    MRN: [id number]           Thank you!     Thank you for choosing [facility] for your care. Our goal is always to provide you with excellent care. Hearing back from our patients is one way we can continue to improve our services. Please take a few minutes to complete the written survey that you may receive in the mail after you visit with us. Thank you!        Patient Information     Date Of Birth          1950        About your hospital stay     You were admitted on:  June 21, 2017 You last received care in the:  [facility] Preop/Phase II    You were discharged on:  June 21, 2017       Who to Call     For medical emergencies, please call 911.  For non-urgent questions about your medical care, please call your primary care provider or clinic, [phone]  For questions related to your surgery, please call your surgery clinic        Attending Provider     Provider Specialty    Rigo Coreas, DO Surgery       Primary Care Provider Office Phone # Fax #    Favian Recinos -[phone]      Your next 10 appointments already scheduled     Jun 26, 2017 11:00 AM CDT   (Arrive by 10:45 AM)   Return Visit with Madison Zambrano RN   [facility] ([facility] )    [address] [place] [id number] [phone]            Jul 17, 2017  8:45 AM CDT   (Arrive by 8:30 AM)   PHYSICAL with Favian Recinos MD   [facility] ([facility] )    [address] [place] [id number]   [phone]              Further instructions from your care team           INSTRUCTIONS AFTER COLONOSCOPY    WHEN YOU ARE BACK HOME:    Plan to rest for an hour or two after you get home.    You may have some cramping or pressure until you pass gas.    You may resume your regular medications.    Eat a small, light meal at first, and then gradually return to normal meal  sizes.  If you had a polyp removed:    Slight bleeding may occur.  You may have a slight blood stain on the toilet paper after a bowel movement.    To lessen the chance of bleeding, avoid heavy exercise for ONE WEEK.  This includes heavy lifting, vigorous sport activities, and heavy physical labor.  You may resume your normal sexual activity.      Avoid aspirin or aspirin products if instructed by your doctor.    WHAT TO WATCH FOR:  Problems rarely occur after the exam; however, it is important for you to watch for early signs of possible problems.  If you have     Unusual pain in your abdomen    Nausea and vomiting that persists    Excessive bleeding    Black or bloody bowel movements    Fever or temperature above 100.6 F  Please call your doctor ([facility] [phone]) or go to the nearest hospital emergency room.    Post-Anesthesia Patient Instructions    IMMEDIATELY FOLLOWING SURGERY:  Do not drive or operate machinery for the first twenty four hours after surgery.  Do not make any important decisions for twenty four hours after surgery or while taking narcotic pain medications or sedatives.  If you develop intractable nausea and vomiting or a severe headache please notify your doctor immediately.    FOLLOW-UP:  Please make an appointment with your surgeon as instructed. You do not need to follow up with anesthesia unless specifically instructed to do so.    WOUND CARE INSTRUCTIONS (if applicable):  Keep a dry clean dressing on the anesthesia/puncture wound site if there is drainage.  Once the wound has quit draining you may leave it open to air.  Generally you should leave the bandage intact for twenty four hours unless there is drainage.  If the epidural site drains for more than 36-48 hours please call the anesthesia department.    QUESTIONS?:  Please feel free to call your physician or the hospital  if you have any questions, and they will be happy to assist you.       Pending Results     No  orders found from 6/19/2017 to 6/22/2017.            Admission Information     Date & Time Provider Department Dept. Phone    6/21/2017 Rigo Coreas,  [facility] Preop/Phase [phone]      Your Vitals Were     Blood Pressure Temperature Respirations Weight Pulse Oximetry BMI (Body Mass Index)    147/99 98.4  F (36.9  C) (Oral) 20 105.7 kg (233 lb) 95% 44.02 kg/m2      MyChart Information     [employer] gives you secure access to your electronic health record. If you see a primary care provider, you can also send messages to your care team and make appointments. If you have questions, please call your primary care clinic.  If you do not have a primary care provider, please call [phone] and they will assist you.        Care EveryWhere ID     This is your Care EveryWhere ID. This could be used by other organizations to access your [facility] medical records  [id number]        Equal Access to Services     LUNA WATKINS : Allie Mir, vanita contreras, willie siddiqi, madai gann. So [facility] [phone].    ATENCIÓN: Si habla español, tiene a sanchez disposición servicios gratuitos de asistencia lingüística. Llame al [phone].    We comply with applicable federal civil rights laws and [place] laws. We do not discriminate on the basis of race, color, national origin, age, disability sex, sexual orientation or gender identity.               Review of your medicines      CONTINUE these medicines which have NOT CHANGED        Dose / Directions    ADVIL PO        Dose:  600 mg   Take 600 mg by mouth every 8 hours as needed for moderate pain   Refills:  0       albuterol 108 (90 BASE) MCG/ACT Inhaler   Commonly known as:  albuterol        Dose:  2 puff   Inhale 2 puffs into the lungs every 4 hours as needed for shortness of breath / dyspnea   Quantity:  1 Inhaler   Refills:  0       aspirin 81 MG EC tablet   Used for:  Controlled type 2 diabetes mellitus without  complication, without long-term current use of insulin (H)        Dose:  81 mg   Take 1 tablet (81 mg) by mouth daily   Quantity:  90 tablet   Refills:  3       atorvastatin 40 MG tablet   Commonly known as:  LIPITOR   Used for:  Controlled type 2 diabetes mellitus without complication, without long-term current use of insulin (H)        Dose:  40 mg   Take 1 tablet (40 mg) by mouth daily   Quantity:  90 tablet   Refills:  3       blood glucose monitoring test strip   Commonly known as:  ONE TOUCH VERIO IQ   Used for:  Type 2 diabetes mellitus with hyperglycemia, without long-term current use of insulin (H)        Use to test blood sugar 2 times daily.   Quantity:  100 each   Refills:  10       COMBIVENT RESPIMAT  MCG/ACT inhaler   Used for:  Other emphysema (H)   Generic drug:  Ipratropium-Albuterol        SEE NOTES   Quantity:  4 g   Refills:  0       FLUoxetine 20 MG capsule   Commonly known as:  PROzac   Used for:  Other depression        Dose:  20 mg   Take 1 capsule (20 mg) by mouth daily   Quantity:  90 capsule   Refills:  1       lisinopril 10 MG tablet   Commonly known as:  PRINIVIL/ZESTRIL   Used for:  Benign essential hypertension        Dose:  10 mg   Take 1 tablet (10 mg) by mouth daily   Quantity:  30 tablet   Refills:  8       loratadine 10 MG tablet   Commonly known as:  CLARITIN        Dose:  10 mg   Take 10 mg by mouth daily.   Refills:  0       metFORMIN 500 MG tablet   Commonly known as:  GLUCOPHAGE   Used for:  Type 2 diabetes mellitus without complication, without long-term current use of insulin (H)        Dose:  500 mg   Take 1 tablet (500 mg) by mouth 3 times daily (with meals)   Quantity:  90 tablet   Refills:  3       PEPCID PO        Dose:  10 mg   Take 10 mg by mouth daily pepcid complete chewable   Refills:  0                Protect others around you: Learn how to safely use, store and throw away your medicines at www.disposemymeds.org.             Medication List: This is a list  of all your medications and when to take them. Check marks below indicate your daily home schedule. Keep this list as a reference.      Medications           Morning Afternoon Evening Bedtime As Needed    ADVIL PO   Take 600 mg by mouth every 8 hours as needed for moderate pain                                albuterol 108 (90 BASE) MCG/ACT Inhaler   Commonly known as:  albuterol   Inhale 2 puffs into the lungs every 4 hours as needed for shortness of breath / dyspnea                                aspirin 81 MG EC tablet   Take 1 tablet (81 mg) by mouth daily                                atorvastatin 40 MG tablet   Commonly known as:  LIPITOR   Take 1 tablet (40 mg) by mouth daily                                blood glucose monitoring test strip   Commonly known as:  ONE TOUCH VERIO IQ   Use to test blood sugar 2 times daily.                                COMBIVENT RESPIMAT  MCG/ACT inhaler   SEE NOTES   Generic drug:  Ipratropium-Albuterol                                FLUoxetine 20 MG capsule   Commonly known as:  PROzac   Take 1 capsule (20 mg) by mouth daily                                lisinopril 10 MG tablet   Commonly known as:  PRINIVIL/ZESTRIL   Take 1 tablet (10 mg) by mouth daily                                loratadine 10 MG tablet   Commonly known as:  CLARITIN   Take 10 mg by mouth daily.                                metFORMIN 500 MG tablet   Commonly known as:  GLUCOPHAGE   Take 1 tablet (500 mg) by mouth 3 times daily (with meals)                                PEPCID PO   Take 10 mg by mouth daily pepcid complete chewable

## 2017-06-21 NOTE — ANESTHESIA POSTPROCEDURE EVALUATION
Patient: Sola Gan    Procedure(s):  COLONOSCOPY WITH POLYPECTOMY, BIOPSY, ENDOSCOPIC MARKER TATOOING - Wound Class: II-Clean Contaminated    Diagnosis:SCREENING  Diagnosis Additional Information: No value filed.    Anesthesia Type:  MAC    Note:  Anesthesia Post Evaluation    Patient location during evaluation: Phase 2 and Bedside  Patient participation: Able to fully participate in evaluation  Level of consciousness: awake and alert  Pain management: adequate  Airway patency: patent  Cardiovascular status: acceptable  Respiratory status: acceptable  Hydration status: stable  PONV: none     Anesthetic complications: None          Last vitals:  Vitals:    06/21/17 1206 06/21/17 1419   BP: 169/69 113/68   Resp: 20 16   Temp: 98.4  F (36.9  C)    SpO2: 97% 95%         Electronically Signed By: Dmitry Day MD  June 21, 2017  2:29 PM

## 2017-06-21 NOTE — IP AVS SNAPSHOT
[facility] Preop/Phase II    [address] [address]    Phone:  [phone]                                       After Visit Summary   6/21/2017    Sola Gan    MRN: [id number]           After Visit Summary Signature Page     I have received my discharge instructions, and my questions have been answered. I have discussed any challenges I see with this plan with the nurse or doctor.    ..........................................................................................................................................  Patient/Patient Representative Signature      ..........................................................................................................................................  Patient Representative Print Name and Relationship to Patient    ..................................................               ................................................  Date                                            Time    ..........................................................................................................................................  Reviewed by Signature/Title    ...................................................              ..............................................  Date                                                            Time

## 2017-06-21 NOTE — OR NURSING
Patient and responsible adult given discharge instructions with no questions regarding instructions. Ingrid score 20. Pain level 0/10.  Discharged from unit via /ch Patient discharged to home. After patient drank fluids no nausea or vomiting IV dced.

## 2017-06-21 NOTE — PROCEDURES
DATE OF SERVICE:  06/21/2017      PREOPERATIVE DIAGNOSIS:  Screening colon malignant neoplasm.      POSTOPERATIVE DIAGNOSES:  Cecal polyp, sigmoid diverticulosis, sigmoid mass.      PROCEDURE:  Colonoscopy, polypectomy and biopsy tattooing.      INDICATION:  Screening colonoscopy.      SURGEON:  Rigo Coreas DO      DESCRIPTION OF PROCEDURE:  Mona Gan was brought into the endoscopy suite and placed in the left lateral decubitus position.  After preprocedural pause and attended monitored anesthesia was administered, the external anus was inspected and was normal.  Digital rectal exam was normal.  The colonoscope was inserted and advanced under direct visualization to the level of the cecum which was identified by the appendiceal orifice and the ileocecal valve.  The prep was fair.  With copious amount of colonic lavage, approximately 85% of colonic mucosa was directly visualized.  In the cecum opposite side to the ileocecal valve, a medium-sized pedunculated polyp was removed using the hot snare, retrieved in the trap and sent to pathology.  The ascending, transverse and descending colon were unremarkable.  In the sigmoid colon, there were multiple small mouth diverticula without evidence of inflammation or bleeding.  At 30 cm, there was a fungating mass that was biopsied multiple times.  This area then was tattooed with methylene blue 5 cm distal to this mass.  Two tattoos were placed on opposite sides of the wall.  The rest of the sigmoid colon and rectum were unremarkable.  Retroflexion was normal.  The extra air was removed from the colon and the colonoscope withdrawn.  The patient tolerated the procedure well and was taken to postanesthesia care unit.  Timing for any interval colonoscopy or possible surgery will depend on the histological evaluation of the polyp and biopsies.         RIGO COREAS DO             D: 06/21/2017 14:23   T: 06/21/2017 15:22   MT:       Name:     MONA GAN    MRN:      [id number]        Account:        [id number]   :      1950           Procedure Date: 2017      Document: [id number]

## 2017-06-21 NOTE — ANESTHESIA CARE TRANSFER NOTE
Patient: Sola Gan    Procedure(s):  COLONOSCOPY WITH POLYPECTOMY, BIOPSY, ENDOSCOPIC MARKER TATOOING - Wound Class: II-Clean Contaminated    Diagnosis: SCREENING  Diagnosis Additional Information: No value filed.    Anesthesia Type:   MAC     Note:  Airway :Nasal Cannula  Patient transferred to:Phase II        Vitals: (Last set prior to Anesthesia Care Transfer)    CRNA VITALS  6/21/2017 1346 - 6/21/2017 1422      6/21/2017             Resp Rate (set): 8                Electronically Signed By: ARABELLA Saha CRNA  June 21, 2017  2:22 PM

## 2017-06-21 NOTE — DISCHARGE INSTRUCTIONS
INSTRUCTIONS AFTER COLONOSCOPY    WHEN YOU ARE BACK HOME:    Plan to rest for an hour or two after you get home.    You may have some cramping or pressure until you pass gas.    You may resume your regular medications.    Eat a small, light meal at first, and then gradually return to normal meal sizes.  If you had a polyp removed:    Slight bleeding may occur.  You may have a slight blood stain on the toilet paper after a bowel movement.    To lessen the chance of bleeding, avoid heavy exercise for ONE WEEK.  This includes heavy lifting, vigorous sport activities, and heavy physical labor.  You may resume your normal sexual activity.      Avoid aspirin or aspirin products if instructed by your doctor.    WHAT TO WATCH FOR:  Problems rarely occur after the exam; however, it is important for you to watch for early signs of possible problems.  If you have     Unusual pain in your abdomen    Nausea and vomiting that persists    Excessive bleeding    Black or bloody bowel movements    Fever or temperature above 100.6 F  Please call your doctor ([facility] [phone]) or go to the nearest hospital emergency room.    Post-Anesthesia Patient Instructions    IMMEDIATELY FOLLOWING SURGERY:  Do not drive or operate machinery for the first twenty four hours after surgery.  Do not make any important decisions for twenty four hours after surgery or while taking narcotic pain medications or sedatives.  If you develop intractable nausea and vomiting or a severe headache please notify your doctor immediately.    FOLLOW-UP:  Please make an appointment with your surgeon as instructed. You do not need to follow up with anesthesia unless specifically instructed to do so.    WOUND CARE INSTRUCTIONS (if applicable):  Keep a dry clean dressing on the anesthesia/puncture wound site if there is drainage.  Once the wound has quit draining you may leave it open to air.  Generally you should leave the bandage intact for twenty four  hours unless there is drainage.  If the epidural site drains for more than 36-48 hours please call the anesthesia department.    QUESTIONS?:  Please feel free to call your physician or the hospital  if you have any questions, and they will be happy to assist you.

## 2017-06-26 ENCOUNTER — HOSPITAL ENCOUNTER (OUTPATIENT)
Dept: EDUCATION SERVICES | Facility: HOSPITAL | Age: 67
Discharge: HOME | End: 2017-06-26
Attending: NURSE PRACTITIONER | Admitting: SURGERY
Payer: COMMERCIAL

## 2017-06-26 ENCOUNTER — ALLIED HEALTH/NURSE VISIT (OUTPATIENT)
Dept: SURGERY | Facility: OTHER | Age: 67
End: 2017-06-26
Attending: SURGERY
Payer: COMMERCIAL

## 2017-06-26 ENCOUNTER — OFFICE VISIT (OUTPATIENT)
Dept: SURGERY | Facility: OTHER | Age: 67
End: 2017-06-26
Attending: SURGERY
Payer: COMMERCIAL

## 2017-06-26 VITALS
HEIGHT: 61 IN | SYSTOLIC BLOOD PRESSURE: 126 MMHG | DIASTOLIC BLOOD PRESSURE: 76 MMHG | BODY MASS INDEX: 44.4 KG/M2 | HEART RATE: 82 BPM | WEIGHT: 235.2 LBS

## 2017-06-26 DIAGNOSIS — K63.5 POLYP OF SIGMOID COLON, UNSPECIFIED TYPE: ICD-10-CM

## 2017-06-26 DIAGNOSIS — E11.9 CONTROLLED TYPE 2 DIABETES MELLITUS WITHOUT COMPLICATION, WITHOUT LONG-TERM CURRENT USE OF INSULIN (H): Primary | ICD-10-CM

## 2017-06-26 DIAGNOSIS — K63.5 POLYP OF CECUM: Primary | ICD-10-CM

## 2017-06-26 DIAGNOSIS — Z98.890 S/P COLONOSCOPY WITH POLYPECTOMY: Primary | ICD-10-CM

## 2017-06-26 DIAGNOSIS — E66.01 MORBID OBESITY DUE TO EXCESS CALORIES (H): ICD-10-CM

## 2017-06-26 LAB — HBA1C MFR BLD: 6.7 % (ref 0–5.7)

## 2017-06-26 PROCEDURE — G0108 DIAB MANAGE TRN  PER INDIV: HCPCS

## 2017-06-26 PROCEDURE — 99214 OFFICE O/P EST MOD 30 MIN: CPT | Performed by: SURGERY

## 2017-06-26 PROCEDURE — 99213 OFFICE O/P EST LOW 20 MIN: CPT

## 2017-06-26 ASSESSMENT — PAIN SCALES - GENERAL: PAINLEVEL: MODERATE PAIN (4)

## 2017-06-26 NOTE — PATIENT INSTRUCTIONS
Thank you for allowing Dr. Coreas and our surgical team to participate in your care. Please call with any scheduling questions or concerns to Elif at [phone] or for nursing questions Jenny [phone].

## 2017-06-26 NOTE — PROGRESS NOTES
"Sola is here for Session 4.     BG readings as follows: fastin-144 (average 124), pre-lunch: 122, post-supper: 108-145.    Lab Results   Component Value Date    A1C 6.7 2017    A1C 11.4 2017       Blood pressure 126/76, pulse 82, height 1.549 m (5' 1\"), weight 106.7 kg (235 lb 3.2 oz). Has lost 20 lbs since Session 1.    Current diabetes medications: Metformin 500 mg tid    Readiness to learn: eager    Barriers to learning: none    Pt actively participated in the session and continues to demonstrate motivation.     Topics covered: Diabetes success plan behavior change goal review, developing problem solving skills, stress and how it affects blood sugar, relationship between diabetes and depression. Symptoms of depression and how they affect diabetes self-care. Rationale for consistent self-care and regular diabetes care visits, identify medical tests/exams needed for regular diabetes care. Diabetes success plan behavior change goal reviewed. Identify community resources for ongoing education and support.     Pt goals updated. Will continue to work on regular exercise and food choices/portions    Follow up Diabetes self-management support plan: contact Diabetes ED and/or PCP. May be moving back to [place]    Will follow prn    Total visit time: 45 minutes    "

## 2017-06-26 NOTE — NURSING NOTE
"Chief Complaint   Patient presents with     Diabetes     Session 4       Initial /76 (BP Location: Right arm, Cuff Size: Adult Large)  Pulse 82  Ht 1.549 m (5' 1\")  Wt 106.7 kg (235 lb 3.2 oz)  BMI 44.44 kg/m2 Estimated body mass index is 44.44 kg/(m^2) as calculated from the following:    Height as of this encounter: 1.549 m (5' 1\").    Weight as of this encounter: 106.7 kg (235 lb 3.2 oz).  Medication Reconciliation: complete   Jossy Alicia LPN      "

## 2017-06-26 NOTE — MR AVS SNAPSHOT
After Visit Summary   6/26/2017    Sola Gan    MRN: [id number]           Patient Information     Date Of Birth          1950        Visit Information        Provider Department      6/26/2017 9:00 AM Rigo Coreas, DO [facility]        Today's Diagnoses     Polyp of cecum    -  1    Polyp of sigmoid colon, unspecified type        Morbid obesity due to excess calories (H)          Care Instructions    Thank you for allowing Dr. Coreas and our surgical team to participate in your care. Please call with any scheduling questions or concerns to Elif at [phone] or for nursing questions Jenny [phone].            Follow-ups after your visit        Your next 10 appointments already scheduled     Jul 17, 2017  8:45 AM CDT   (Arrive by 8:30 AM)   PHYSICAL with Favian Recinos MD   [facility] ([facility] )    [address] [place] [address]   [phone]              Who to contact     If you have questions or need follow up information about today's clinic visit or your schedule please contact [facility] directly at [phone].  Normal or non-critical lab and imaging results will be communicated to you by MyChart, letter or phone within 4 business days after the clinic has received the results. If you do not hear from us within 7 days, please contact the clinic through [employer] or phone. If you have a critical or abnormal lab result, we will notify you by phone as soon as possible.  Submit refill requests through [employer] or call your pharmacy and they will forward the refill request to us. Please allow 3 business days for your refill to be completed.          Additional Information About Your Visit        MyChart Information     [employer] gives you secure access to your electronic health record. If you see a primary care provider, you can also send messages to your care team and make appointments. If you have  questions, please call your primary care clinic.  If you do not have a primary care provider, please call [phone] and they will assist you.        Care EveryWhere ID     This is your Care EveryWhere ID. This could be used by other organizations to access your [place] medical records  [id number]         Blood Pressure from Last 3 Encounters:   06/26/17 126/76   06/21/17 151/95   05/22/17 136/72    Weight from Last 3 Encounters:   06/26/17 235 lb 3.2 oz (106.7 kg)   06/21/17 233 lb (105.7 kg)   05/22/17 236 lb (107 kg)              Today, you had the following     No orders found for display       Primary Care Provider Office Phone # Fax #    Favian Recinos [phone]       [facility] [address]  [facility] [address]        Equal Access to Services     [facility]: Hadii aad ku hadasho Soomaali, waaxda luqadaha, qaybta kaalmada adeegyada, waxay idiin hayaan adecorin keller . So [facility] [phone].    ATENCIÓN: Si habla español, tiene a sanchez disposición servicios gratuitos de asistencia lingüística. Llame al [phone].    We comply with applicable federal civil rights laws and [place] laws. We do not discriminate on the basis of race, color, national origin, age, disability sex, sexual orientation or gender identity.            Thank you!     Thank you for choosing [facility] [place]  for your care. Our goal is always to provide you with excellent care. Hearing back from our patients is one way we can continue to improve our services. Please take a few minutes to complete the written survey that you may receive in the mail after your visit with us. Thank you!             Your Updated Medication List - Protect others around you: Learn how to safely use, store and throw away your medicines at www.disposemymeds.org.          This list is accurate as of: 6/26/17 11:33 AM.  Always use your most recent med list.                   Brand Name Dispense Instructions for  use Diagnosis    ADVIL PO      Take 600 mg by mouth every 8 hours as needed for moderate pain        albuterol 108 (90 BASE) MCG/ACT Inhaler    albuterol    1 Inhaler    Inhale 2 puffs into the lungs every 4 hours as needed for shortness of breath / dyspnea        aspirin 81 MG EC tablet     90 tablet    Take 1 tablet (81 mg) by mouth daily    Controlled type 2 diabetes mellitus without complication, without long-term current use of insulin (H)       atorvastatin 40 MG tablet    LIPITOR    90 tablet    Take 1 tablet (40 mg) by mouth daily    Controlled type 2 diabetes mellitus without complication, without long-term current use of insulin (H)       blood glucose monitoring test strip    ONE TOUCH VERIO IQ    100 each    Use to test blood sugar 2 times daily.    Type 2 diabetes mellitus with hyperglycemia, without long-term current use of insulin (H)       COMBIVENT RESPIMAT  MCG/ACT inhaler   Generic drug:  Ipratropium-Albuterol     4 g    SEE NOTES    Other emphysema (H)       FLUoxetine 20 MG capsule    PROzac    90 capsule    Take 1 capsule (20 mg) by mouth daily    Other depression       lisinopril 10 MG tablet    PRINIVIL/ZESTRIL    30 tablet    Take 1 tablet (10 mg) by mouth daily    Benign essential hypertension       loratadine 10 MG tablet    CLARITIN     Take 10 mg by mouth daily.        metFORMIN 500 MG tablet    GLUCOPHAGE    90 tablet    Take 1 tablet (500 mg) by mouth 3 times daily (with meals)    Type 2 diabetes mellitus without complication, without long-term current use of insulin (H)       PEPCID PO      Take 10 mg by mouth daily pepcid complete chewable

## 2017-06-26 NOTE — PROGRESS NOTES
S: Here to discuss pathology report from screening colonoscopy on 6/21/17.  There was a polyp in the cecum as well as a mass that was biopsied in the sigmoid colon    O:  Gen: AAOx4, NAD, non toxic   Abd: morbidly obese    A/P  #1 Cecal polyp  #2 Sigmoid mass  #3 Morbid obesity    Unfortunately the pathology isn't here yet.  I suspect that the sigmoid mass is a cancer given that it was ulcerative.  Its hard for me to develop a plan without the pathology.  She is understanding of this and is not angry.  She appreciated me reviewing the images of the colonoscopy and describing the methods of repeat colonoscopy or work up for staging of cancer.  I described that even if another endoscopy was attempted to remove a benign mass, this might still result in a surgery if it can't be safely removed. 30 minutes was spent talking with the patient.

## 2017-06-27 ENCOUNTER — OFFICE VISIT (OUTPATIENT)
Dept: SURGERY | Facility: OTHER | Age: 67
End: 2017-06-27
Attending: SURGERY
Payer: COMMERCIAL

## 2017-06-27 VITALS
TEMPERATURE: 98.4 F | DIASTOLIC BLOOD PRESSURE: 82 MMHG | HEIGHT: 61 IN | SYSTOLIC BLOOD PRESSURE: 146 MMHG | OXYGEN SATURATION: 97 % | HEART RATE: 86 BPM | BODY MASS INDEX: 43.99 KG/M2 | WEIGHT: 233 LBS

## 2017-06-27 DIAGNOSIS — C18.7 ADENOCARCINOMA OF SIGMOID COLON (H): Primary | ICD-10-CM

## 2017-06-27 DIAGNOSIS — D12.6 TUBULAR ADENOMA OF COLON: ICD-10-CM

## 2017-06-27 DIAGNOSIS — E66.01 MORBID OBESITY DUE TO EXCESS CALORIES (H): ICD-10-CM

## 2017-06-27 LAB
ALBUMIN SERPL-MCNC: 4 G/DL (ref 3.4–5)
ALP SERPL-CCNC: 99 U/L (ref 40–150)
ALT SERPL W P-5'-P-CCNC: 35 U/L (ref 0–50)
ANION GAP SERPL CALCULATED.3IONS-SCNC: 4 MMOL/L (ref 3–14)
APTT PPP: 28 SEC (ref 24–37)
AST SERPL W P-5'-P-CCNC: 20 U/L (ref 0–45)
BASOPHILS # BLD AUTO: 0.1 10E9/L (ref 0–0.2)
BASOPHILS NFR BLD AUTO: 1 %
BILIRUB DIRECT SERPL-MCNC: 0.1 MG/DL (ref 0–0.2)
BILIRUB SERPL-MCNC: 0.5 MG/DL (ref 0.2–1.3)
BUN SERPL-MCNC: 12 MG/DL (ref 7–30)
CALCIUM SERPL-MCNC: 9.1 MG/DL (ref 8.5–10.1)
CHLORIDE SERPL-SCNC: 104 MMOL/L (ref 94–109)
CO2 SERPL-SCNC: 28 MMOL/L (ref 20–32)
CREAT SERPL-MCNC: 0.69 MG/DL (ref 0.52–1.04)
DIFFERENTIAL METHOD BLD: ABNORMAL
EOSINOPHIL # BLD AUTO: 0.3 10E9/L (ref 0–0.7)
EOSINOPHIL NFR BLD AUTO: 4.8 %
ERYTHROCYTE [DISTWIDTH] IN BLOOD BY AUTOMATED COUNT: 13.8 % (ref 10–15)
GFR SERPL CREATININE-BSD FRML MDRD: 86 ML/MIN/1.7M2
GLUCOSE SERPL-MCNC: 159 MG/DL (ref 70–99)
HCT VFR BLD AUTO: 48.5 % (ref 35–47)
HGB BLD-MCNC: 16.2 G/DL (ref 11.7–15.7)
IMM GRANULOCYTES # BLD: 0 10E9/L (ref 0–0.4)
IMM GRANULOCYTES NFR BLD: 0.4 %
INR PPP: 1.03 (ref 0.8–1.2)
LDH SERPL L TO P-CCNC: 165 U/L (ref 81–234)
LYMPHOCYTES # BLD AUTO: 2.2 10E9/L (ref 0.8–5.3)
LYMPHOCYTES NFR BLD AUTO: 30.9 %
MCH RBC QN AUTO: 29.2 PG (ref 26.5–33)
MCHC RBC AUTO-ENTMCNC: 33.4 G/DL (ref 31.5–36.5)
MCV RBC AUTO: 87 FL (ref 78–100)
MONOCYTES # BLD AUTO: 0.6 10E9/L (ref 0–1.3)
MONOCYTES NFR BLD AUTO: 7.8 %
NEUTROPHILS # BLD AUTO: 3.9 10E9/L (ref 1.6–8.3)
NEUTROPHILS NFR BLD AUTO: 55.1 %
NRBC # BLD AUTO: 0 10*3/UL
NRBC BLD AUTO-RTO: 0 /100
PLATELET # BLD AUTO: 283 10E9/L (ref 150–450)
POTASSIUM SERPL-SCNC: 3.7 MMOL/L (ref 3.4–5.3)
PROT SERPL-MCNC: 8.1 G/DL (ref 6.8–8.8)
RBC # BLD AUTO: 5.55 10E12/L (ref 3.8–5.2)
SODIUM SERPL-SCNC: 136 MMOL/L (ref 133–144)
WBC # BLD AUTO: 7.2 10E9/L (ref 4–11)

## 2017-06-27 PROCEDURE — 99000 SPECIMEN HANDLING OFFICE-LAB: CPT | Performed by: CLINICAL NURSE SPECIALIST

## 2017-06-27 PROCEDURE — 36415 COLL VENOUS BLD VENIPUNCTURE: CPT | Mod: ZL | Performed by: CLINICAL NURSE SPECIALIST

## 2017-06-27 PROCEDURE — 85730 THROMBOPLASTIN TIME PARTIAL: CPT | Mod: ZL | Performed by: CLINICAL NURSE SPECIALIST

## 2017-06-27 PROCEDURE — 85610 PROTHROMBIN TIME: CPT | Mod: ZL | Performed by: CLINICAL NURSE SPECIALIST

## 2017-06-27 PROCEDURE — 82378 CARCINOEMBRYONIC ANTIGEN: CPT | Mod: ZL | Performed by: CLINICAL NURSE SPECIALIST

## 2017-06-27 PROCEDURE — 99212 OFFICE O/P EST SF 10 MIN: CPT

## 2017-06-27 PROCEDURE — 99213 OFFICE O/P EST LOW 20 MIN: CPT | Performed by: SURGERY

## 2017-06-27 PROCEDURE — 80076 HEPATIC FUNCTION PANEL: CPT | Mod: ZL | Performed by: CLINICAL NURSE SPECIALIST

## 2017-06-27 PROCEDURE — 85025 COMPLETE CBC W/AUTO DIFF WBC: CPT | Mod: ZL | Performed by: CLINICAL NURSE SPECIALIST

## 2017-06-27 PROCEDURE — 80048 BASIC METABOLIC PNL TOTAL CA: CPT | Mod: ZL | Performed by: CLINICAL NURSE SPECIALIST

## 2017-06-27 PROCEDURE — 83615 LACTATE (LD) (LDH) ENZYME: CPT | Mod: ZL | Performed by: CLINICAL NURSE SPECIALIST

## 2017-06-27 ASSESSMENT — PAIN SCALES - GENERAL: PAINLEVEL: NO PAIN (1)

## 2017-06-27 NOTE — PROGRESS NOTES
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  The polyp in the cecum was a tubular adenoma.  Even though there were many pieces of the cecal polyp, that was removed with a single snare.     O:  /82 (BP Location: Right arm, Patient Position: Chair, Cuff Size: Adult Regular)  Pulse 86  Temp 98.4  F (36.9  C) (Tympanic)  Ht 5' 1\" (1.549 m)  Wt 233 lb (105.7 kg)  SpO2 97%  BMI 44.02 kg/m2     A/P  #1 Adenocarcinoma of the sigmoid colon  #2 Tubular adenoma of the cecum  #3 Morbid obesity    We already had a long talk yesterday.  This wasn't unexpected, but now is official.  I spent a considerable amount of time describing the sequence of events.  I've ordered the lab work and the imaging.  If no mets, then we'll refer to [place] to Dr. Perry.   "

## 2017-06-27 NOTE — NURSING NOTE
"Chief Complaint   Patient presents with     Surgical Followup     s/p-Colonoscopy 6/21/17-Dr Recinos is primary.         Initial /82 (BP Location: Right arm, Patient Position: Chair, Cuff Size: Adult Regular)  Pulse 86  Temp 98.4  F (36.9  C) (Tympanic)  Ht 5' 1\" (1.549 m)  Wt 233 lb (105.7 kg)  SpO2 97%  BMI 44.02 kg/m2 Estimated body mass index is 44.02 kg/(m^2) as calculated from the following:    Height as of this encounter: 5' 1\" (1.549 m).    Weight as of this encounter: 233 lb (105.7 kg).  Medication Reconciliation: david MEEK      "

## 2017-06-27 NOTE — MR AVS SNAPSHOT
After Visit Summary   6/27/2017    Sola Gan    MRN: [id number]           Patient Information     Date Of Birth          1950        Visit Information        Provider Department      6/27/2017 11:45 AM Rigo Coreas, DO [facility]        Today's Diagnoses     Adenocarcinoma of sigmoid colon (H)    -  1    Tubular adenoma of colon        Morbid obesity due to excess calories (H)           Follow-ups after your visit        Your next 10 appointments already scheduled     Jul 17, 2017  8:45 AM CDT   (Arrive by 8:30 AM)   PHYSICAL with Favian Recinos MD   [facility] ([facility] )    [address]  [facility] [address]   [phone]              Who to contact     If you have questions or need follow up information about today's clinic visit or your schedule please contact [facility] directly at [phone].  Normal or non-critical lab and imaging results will be communicated to you by MyChart, letter or phone within 4 business days after the clinic has received the results. If you do not hear from us within 7 days, please contact the clinic through [employer] or phone. If you have a critical or abnormal lab result, we will notify you by phone as soon as possible.  Submit refill requests through [employer] or call your pharmacy and they will forward the refill request to us. Please allow 3 business days for your refill to be completed.          Additional Information About Your Visit        MyChart Information     [employer] gives you secure access to your electronic health record. If you see a primary care provider, you can also send messages to your care team and make appointments. If you have questions, please call your primary care clinic.  If you do not have a primary care provider, please call [phone] and they will assist you.        Care EveryWhere ID     This is your Care EveryWhere ID. This could be used by other  "organizations to access your [place] medical records  [id number]        Your Vitals Were     Pulse Temperature Height Pulse Oximetry BMI (Body Mass Index)       86 98.4  F (36.9  C) (Tympanic) 5' 1\" (1.549 m) 97% 44.02 kg/m2        Blood Pressure from Last 3 Encounters:   06/27/17 146/82   06/26/17 126/76   06/21/17 151/95    Weight from Last 3 Encounters:   06/27/17 233 lb (105.7 kg)   06/26/17 235 lb 3.2 oz (106.7 kg)   06/21/17 233 lb (105.7 kg)              We Performed the Following     Basic metabolic panel     CBC with platelets differential     CEA     Hepatic panel (Albumin, ALT, AST, Bili, Alk Phos, TP)     INR     Lactate Dehydrogenase     Partial thromboplastin time        Primary Care Provider Office Phone # Fax #    Favian Recinos [phone]       [facility] [address]  [facility] [address]        Equal Access to Services     [facility] : Hadii aad ku hadasho Soomaali, waaxda luqadaha, qaybta kaalmada adeegyada, waxay idiin hayaan belkys keller . So [facility] [phone].    ATENCIÓN: Si habla español, tiene a sanchez disposición servicios gratuitos de asistencia lingüística. LlUC [facility] [phone].    We comply with applicable federal civil rights laws and [place] laws. We do not discriminate on the basis of race, color, national origin, age, disability sex, sexual orientation or gender identity.            Thank you!     Thank you for choosing [facility] [place]  for your care. Our goal is always to provide you with excellent care. Hearing back from our patients is one way we can continue to improve our services. Please take a few minutes to complete the written survey that you may receive in the mail after your visit with us. Thank you!             Your Updated Medication List - Protect others around you: Learn how to safely use, store and throw away your medicines at www.disposemymeds.org.          This list is accurate as of: 6/27/17 12:37 PM.  Always " use your most recent med list.                   Brand Name Dispense Instructions for use Diagnosis    ADVIL PO      Take 600 mg by mouth every 8 hours as needed for moderate pain        albuterol 108 (90 BASE) MCG/ACT Inhaler    albuterol    1 Inhaler    Inhale 2 puffs into the lungs every 4 hours as needed for shortness of breath / dyspnea        aspirin 81 MG EC tablet     90 tablet    Take 1 tablet (81 mg) by mouth daily    Controlled type 2 diabetes mellitus without complication, without long-term current use of insulin (H)       atorvastatin 40 MG tablet    LIPITOR    90 tablet    Take 1 tablet (40 mg) by mouth daily    Controlled type 2 diabetes mellitus without complication, without long-term current use of insulin (H)       blood glucose monitoring test strip    ONE TOUCH VERIO IQ    100 each    Use to test blood sugar 2 times daily.    Type 2 diabetes mellitus with hyperglycemia, without long-term current use of insulin (H)       COMBIVENT RESPIMAT  MCG/ACT inhaler   Generic drug:  Ipratropium-Albuterol     4 g    SEE NOTES    Other emphysema (H)       FLUoxetine 20 MG capsule    PROzac    90 capsule    Take 1 capsule (20 mg) by mouth daily    Other depression       lisinopril 10 MG tablet    PRINIVIL/ZESTRIL    30 tablet    Take 1 tablet (10 mg) by mouth daily    Benign essential hypertension       loratadine 10 MG tablet    CLARITIN     Take 10 mg by mouth daily.        metFORMIN 500 MG tablet    GLUCOPHAGE    90 tablet    Take 1 tablet (500 mg) by mouth 3 times daily (with meals)    Type 2 diabetes mellitus without complication, without long-term current use of insulin (H)       PEPCID PO      Take 10 mg by mouth daily pepcid complete chewable

## 2017-06-28 LAB — CEA SERPL-MCNC: 1.6 UG/L (ref 0–2.5)

## 2017-06-30 ENCOUNTER — HOSPITAL ENCOUNTER (OUTPATIENT)
Dept: CT IMAGING | Facility: HOSPITAL | Age: 67
Discharge: HOME | End: 2017-06-30
Attending: CLINICAL NURSE SPECIALIST | Admitting: CLINICAL NURSE SPECIALIST
Payer: COMMERCIAL

## 2017-06-30 LAB — COPATH REPORT: NORMAL

## 2017-06-30 PROCEDURE — 71260 CT THORAX DX C+: CPT | Mod: TC

## 2017-06-30 PROCEDURE — 74177 CT ABD & PELVIS W/CONTRAST: CPT | Mod: TC

## 2017-06-30 RX ORDER — IOPAMIDOL 612 MG/ML
100 INJECTION, SOLUTION INTRAVASCULAR ONCE
Status: COMPLETED | OUTPATIENT
Start: 2017-06-30 | End: 2017-06-30

## 2017-06-30 RX ADMIN — IOPAMIDOL 100 ML: 612 INJECTION, SOLUTION INTRAVASCULAR at 13:54

## 2017-06-30 RX ADMIN — DIATRIZOATE MEGLUMINE AND DIATRIZOATE SODIUM 30 ML: 660; 100 SOLUTION ORAL; RECTAL at 13:54

## 2017-07-04 DIAGNOSIS — J43.8 OTHER EMPHYSEMA (H): ICD-10-CM

## 2017-07-05 ENCOUNTER — TELEPHONE (OUTPATIENT)
Dept: SURGERY | Facility: OTHER | Age: 67
End: 2017-07-05

## 2017-07-05 NOTE — TELEPHONE ENCOUNTER
Patient called inquiring about her ct results following adenocarcinoma found on her colonoscopy. Writer had debora luis review the ct results to be able to call patient and let her know that the cancer did not metastisize. Writer did let patient know that we are awaiting on Dr Coreas review of the ct results to figure out her plan of care from here on out. Patient thanked writer and writer let her know we would contact her when we had Dr Coreas review.    Jenny Asencio

## 2017-07-06 RX ORDER — IPRATROPIUM BROMIDE AND ALBUTEROL 20; 100 UG/1; UG/1
SPRAY, METERED RESPIRATORY (INHALATION)
Qty: 4 G | Refills: 1 | Status: SHIPPED | OUTPATIENT
Start: 2017-07-06 | End: 2018-07-10

## 2017-07-11 DIAGNOSIS — C18.7 ADENOCARCINOMA OF SIGMOID COLON (H): Primary | ICD-10-CM

## 2017-07-12 ENCOUNTER — TELEPHONE (OUTPATIENT)
Dept: FAMILY MEDICINE | Facility: OTHER | Age: 67
End: 2017-07-12

## 2017-07-12 NOTE — TELEPHONE ENCOUNTER
I called patient, summarized the workup thus far and agreed with Dr. Edwards referral.  She is pleased with the call back.  Thanks. . Me

## 2017-07-12 NOTE — TELEPHONE ENCOUNTER
8:32 AM    Reason for Call: Phone Call    Description: Pt called and stated she was diagnosed with colon cancer by Dr Coreas. She is waiting to be set up for surgery down in [place]. She would like to discuss. Please call her back at [phone]    Was an appointment offered for this call? No, pt has appt on Monday    Preferred method for responding to this message: Telephone Call    If we cannot reach you directly, may we leave a detailed response at the number you provided? Yes    Can this message wait until your PCP/provider returns, if available today? Not applicable    Aracelis Monzon

## 2017-07-17 ENCOUNTER — OFFICE VISIT (OUTPATIENT)
Dept: FAMILY MEDICINE | Facility: OTHER | Age: 67
End: 2017-07-17
Attending: FAMILY MEDICINE
Payer: COMMERCIAL

## 2017-07-17 VITALS
HEIGHT: 61 IN | DIASTOLIC BLOOD PRESSURE: 66 MMHG | SYSTOLIC BLOOD PRESSURE: 126 MMHG | BODY MASS INDEX: 43.23 KG/M2 | RESPIRATION RATE: 16 BRPM | TEMPERATURE: 97 F | WEIGHT: 229 LBS | HEART RATE: 86 BPM | OXYGEN SATURATION: 99 %

## 2017-07-17 DIAGNOSIS — J43.8 OTHER EMPHYSEMA (H): Chronic | ICD-10-CM

## 2017-07-17 DIAGNOSIS — I10 ESSENTIAL HYPERTENSION: ICD-10-CM

## 2017-07-17 DIAGNOSIS — Z23 NEED FOR PROPHYLACTIC VACCINATION AND INOCULATION AGAINST COMBINATIONS OF DISEASE: ICD-10-CM

## 2017-07-17 DIAGNOSIS — Z00.00 ROUTINE GENERAL MEDICAL EXAMINATION AT A HEALTH CARE FACILITY: Primary | ICD-10-CM

## 2017-07-17 DIAGNOSIS — E11.9 CONTROLLED TYPE 2 DIABETES MELLITUS WITHOUT COMPLICATION, WITHOUT LONG-TERM CURRENT USE OF INSULIN (H): ICD-10-CM

## 2017-07-17 DIAGNOSIS — D49.0 COLON TUMOR: ICD-10-CM

## 2017-07-17 PROCEDURE — 90471 IMMUNIZATION ADMIN: CPT | Performed by: FAMILY MEDICINE

## 2017-07-17 PROCEDURE — G0123 SCREEN CERV/VAG THIN LAYER: HCPCS | Mod: ZL | Performed by: FAMILY MEDICINE

## 2017-07-17 PROCEDURE — G0476 HPV COMBO ASSAY CA SCREEN: HCPCS | Mod: ZL | Performed by: FAMILY MEDICINE

## 2017-07-17 PROCEDURE — 99396 PREV VISIT EST AGE 40-64: CPT | Performed by: FAMILY MEDICINE

## 2017-07-17 PROCEDURE — 99000 SPECIMEN HANDLING OFFICE-LAB: CPT | Mod: ZL | Performed by: FAMILY MEDICINE

## 2017-07-17 PROCEDURE — 90715 TDAP VACCINE 7 YRS/> IM: CPT | Performed by: FAMILY MEDICINE

## 2017-07-17 RX ORDER — CETIRIZINE HYDROCHLORIDE 10 MG/1
10 TABLET ORAL DAILY
COMMUNITY
End: 2018-05-03

## 2017-07-17 ASSESSMENT — ANXIETY QUESTIONNAIRES
IF YOU CHECKED OFF ANY PROBLEMS ON THIS QUESTIONNAIRE, HOW DIFFICULT HAVE THESE PROBLEMS MADE IT FOR YOU TO DO YOUR WORK, TAKE CARE OF THINGS AT HOME, OR GET ALONG WITH OTHER PEOPLE: NOT DIFFICULT AT ALL
1. FEELING NERVOUS, ANXIOUS, OR ON EDGE: SEVERAL DAYS
6. BECOMING EASILY ANNOYED OR IRRITABLE: SEVERAL DAYS
3. WORRYING TOO MUCH ABOUT DIFFERENT THINGS: NOT AT ALL
5. BEING SO RESTLESS THAT IT IS HARD TO SIT STILL: SEVERAL DAYS
7. FEELING AFRAID AS IF SOMETHING AWFUL MIGHT HAPPEN: NOT AT ALL
GAD7 TOTAL SCORE: 4
2. NOT BEING ABLE TO STOP OR CONTROL WORRYING: NOT AT ALL

## 2017-07-17 ASSESSMENT — PATIENT HEALTH QUESTIONNAIRE - PHQ9: 5. POOR APPETITE OR OVEREATING: SEVERAL DAYS

## 2017-07-17 ASSESSMENT — PAIN SCALES - GENERAL: PAINLEVEL: NO PAIN (0)

## 2017-07-17 NOTE — PROGRESS NOTES
Subjective:  Sola Gan is a 66 year old female who presents for routine cares.  Doing well and we have all pieces in place.  Having colon surgery for malignancy.  Due for mammo in 3 months due to some probable benign findings. Due for female cares.  Talked about tobacco.     Past Medical History:   Diagnosis Date     COPD (chronic obstructive pulmonary disease) (H)      Diabetes (H)      H/O sleep apnea     tonsils, adnoids, and uvula removed     Uncomplicated asthma        Past Surgical History:   Procedure Laterality Date     APPENDECTOMY       CHOLECYSTECTOMY       COLONOSCOPY N/A 6/21/2017    Procedure: COLONOSCOPY;  COLONOSCOPY WITH POLYPECTOMY, BIOPSY, ENDOSCOPIC MARKER TATOOING;  Surgeon: Rigo Coreas DO;  Location: [facility]     ENT SURGERY      for MORA     GYN SURGERY       HERNIA REPAIR         Family History   Problem Relation Age of Onset     Other Cancer Mother      ovarian      Breast Cancer Mother      Bronchitis Father      Peripheral Neuropathy Father      Hypertension Father        Social History   Substance Use Topics     Smoking status: Current Every Day Smoker     Packs/day: 0.75     Years: 20.00     Smokeless tobacco: Never Used     Alcohol use No       Current Outpatient Prescriptions   Medication     cetirizine (ZYRTEC ALLERGY) 10 MG tablet     UNABLE TO FIND     COMBIVENT RESPIMAT  MCG/ACT inhaler     lisinopril (PRINIVIL/ZESTRIL) 10 MG tablet     metFORMIN (GLUCOPHAGE) 500 MG tablet     aspirin 81 MG EC tablet     atorvastatin (LIPITOR) 40 MG tablet     FLUoxetine (PROZAC) 20 MG capsule     blood glucose monitoring (ONE TOUCH VERIO IQ) test strip     Ibuprofen (ADVIL PO)     albuterol (ALBUTEROL) 108 (90 BASE) MCG/ACT Inhaler     Famotidine (PEPCID PO)     No current facility-administered medications for this visit.        Allergies   Allergen Reactions     Acyclovir Nausea     Sulfa Drugs Hives       Review of Systems:  Gen: negative for fever, chills, change in  weight  Derm: negative for worrisome rashes, moles or lesions  Eyes: negative for vision changes or irritation  ENT: negative for ear, mouth and throat problems  Resp: negative for significant cough or SOB  Breast: negative for masses, tenderness or discharge  CV: negative for chest pain, palpitations or peripheral edema  GI: negative for nausea, abdominal pain, heartburn, or change in bowel habits  : negative for dysuria, hematuria, vaginal discharge, dysparunia and abnormal menstrual cycles  Musculoskeletal: negative for significant arthralgias or myalgia  Neuro: negative for weakness, dizziness or paresthesias  Endo: negative for temperature intolerance, skin/hair changes  Heme: negative for bleeding problems  Psych: negative for changes in mood or affect    Objective:  B/P: 126/66, T: 97, P: 86, R: 16    Physical Exam:  Constitutional: healthy, alert and no distress  Head: Normocephalic. No masses, lesions, tenderness or abnormalities  ENT: ENT exam normal, no neck nodes or sinus tenderness  Breasts: symmetric, no dominant or suspicious mass, no skin or nipple changes or no axillary adenopathy   CV: RRR. No murmurs, clicks gallops or rub  Pulm: Lungs clear to auscultation b/l, no rhonchi, rales or wheezes.  GI: Abdomen soft, non-tender. BS normal. No masses, organomegaly  : No abnormalities noted to external genitalia.  Pap obtained.  Bimanual no masses.    Musculoskeletal: extremities normal- no gross deformities noted, gait normal and normal muscle tone  Skin: no suspicious lesions or rashes  Neuroc: Gait normal. Reflexes normal and symmetric. Sensation grossly WNL.  Psych: mentation appears normal and affect normal/bright  Heme: normal ant/post cervical, axillary, supraclavicular and inguinal nodes    Labs pending, see chart for results.  No results found for: PAP    Imaging:  Mammogram due in 3 months.      Assessment and Plan:  (Z00.00) Routine general medical examination at a health care facility   (primary encounter diagnosis)  Comment: doing well.   Plan: TOBACCO CESSATION - FOR HEALTH MAINTENANCE        Update normal cares.     (J43.8) Other emphysema (H)  Comment: ongoing  Plan: quitting smoking.      (E11.9) Controlled type 2 diabetes mellitus without complication, without long-term current use of insulin (H)  Comment: stable.   Plan: no change.      (I10) Essential hypertension  Comment: stable.   Plan: no change.      (D49.0) Colon tumor  Comment: malignant.   Plan: having surgery in [place].    .      Favian Recinos

## 2017-07-17 NOTE — MR AVS SNAPSHOT
After Visit Summary   7/17/2017    Sola Gan    MRN: [id number]           Patient Information     Date Of Birth          1950        Visit Information        Provider Department      7/17/2017 8:45 AM Favian Recinos MD [facility]        Today's Diagnoses     Routine general medical examination at a health care facility    -  1    Other emphysema (H)        Controlled type 2 diabetes mellitus without complication, without long-term current use of insulin (H)        Essential hypertension        Colon tumor        Need for prophylactic vaccination and inoculation against combinations of disease          Care Instructions    F/u annually.           Follow-ups after your visit        Additional Services     CALL IT QUITS (QUITPLAN) REFERRAL       [place] [employer]AC[employer] QUITLINES FAX FORM  Fax form to: [phone]    The clinic will facilitate the referral to the quitline.    Provider Information:  ===============================================================  Favian Recinos MD  ID#: [id number] - [facility] [phone] Fax: [phone]   [email or web address] / [facility]/[place]  Payor: COMMERCIAL / Plan: IMCARE PMAP / Product Type: Indemnity /   ===============================================================    The Public Health Service Guideline does not recommend providing over-the-counter nicotine replacement therapy products without physician authorization to patients with the following conditions: pregnancy, uncontrolled high blood pressure, or cardiovascular diseases.     I authorize the [place] Tobacco Quitlines to provide over-the-counter nicotine replacement products for the patient listed below if the patient's health plan benefits cover NRT or if the patient is eligible for QUITPLAN services.    Patient Consented to:  ===============================================================  - YES - I am ready to quit  tobacco and request the above information be given to the quitline so they may contact me.  I understand that one of [place]'s Tobacco Quitlines will inform my provider about my participation.  ===============================================================  Please check the BEST 3-hour call window for them to reach you: 11am - 2pm  May we leave a message?  YES  Language Preference:  English  Phone Number: Home Phone      [phone]  Mobile          [phone]     E-mail Address: [email or web address]    ========================================================================  FOR QUITLINE USE ONLY:  THIS INFORMATION WILL BE PROVIDED BACK TO THE PROVIDER  Contact date: __/ __/__ or ____ Did not reach after three attempts.    Outcome:  __ Enrolled in telephone counseling program  __ Declined  __ Not Reached    Stage of readiness: _______________________  Planned Quit Date: ___/ ___/ ___  Comments:      2011 [facility]   This message funded by [employer] [place], an independent licensee of the Blue [employer] and [employer]. Rev. 11/1/12                  Who to contact     If you have questions or need follow up information about today's clinic visit or your schedule please contact [facility] directly at [phone].  Normal or non-critical lab and imaging results will be communicated to you by MyChart, letter or phone within 4 business days after the clinic has received the results. If you do not hear from us within 7 days, please contact the clinic through MyChart or phone. If you have a critical or abnormal lab result, we will notify you by phone as soon as possible.  Submit refill requests through MyChart or call your pharmacy and they will forward the refill request to us. Please allow 3 business days for your refill to be completed.          Additional Information About Your Visit        [employer] Information     [employer] gives you secure access  "to your electronic health record. If you see a primary care provider, you can also send messages to your care team and make appointments. If you have questions, please call your primary care clinic.  If you do not have a primary care provider, please call [phone] and they will assist you.        Care EveryWhere ID     This is your Care EveryWhere ID. This could be used by other organizations to access your [place] medical records  [id number]        Your Vitals Were     Pulse Temperature Respirations Height Pulse Oximetry BMI (Body Mass Index)    86 97  F (36.1  C) (Tympanic) 16 5' 1\" (1.549 m) 99% 43.27 kg/m2       Blood Pressure from Last 3 Encounters:   07/17/17 126/66   06/27/17 146/82   06/26/17 126/76    Weight from Last 3 Encounters:   07/17/17 229 lb (103.9 kg)   06/27/17 233 lb (105.7 kg)   06/26/17 235 lb 3.2 oz (106.7 kg)              We Performed the Following     A pap thin layer screen with  HPV - recommended age 30 - 65 years (select HPV order below)     ADMIN 1st VACCINE     CALL IT QUITS (QUITPLAN) REFERRAL     TDAP VACCINE (ADACEL)     TOBACCO CESSATION - FOR HEALTH MAINTENANCE          Today's Medication Changes          These changes are accurate as of: 7/17/17 11:38 AM.  If you have any questions, ask your nurse or doctor.               Stop taking these medicines if you haven't already. Please contact your care team if you have questions.     loratadine 10 MG tablet   Commonly known as:  CLARITIN   Stopped by:  Favian Recinos MD                    Primary Care Provider Office Phone # Fax #    Favian Recinos [phone]       [facility] [address]  [facility] [place] [address]        Equal Access to Services     [facility] AH: Hadii nicol Mir, waaxda luqadaha, qaybta kaalmada adecorinyada, madai gann. So [facility] [phone].    ATENCIÓN: Si habla español, tiene a sanchez disposición servicios gratuitos de asistencia " lingüísticaConi Awad al [phone].    We comply with applicable federal civil rights laws and [place] laws. We do not discriminate on the basis of race, color, national origin, age, disability sex, sexual orientation or gender identity.            Thank you!     Thank you for choosing [facility]  for your care. Our goal is always to provide you with excellent care. Hearing back from our patients is one way we can continue to improve our services. Please take a few minutes to complete the written survey that you may receive in the mail after your visit with us. Thank you!             Your Updated Medication List - Protect others around you: Learn how to safely use, store and throw away your medicines at www.disposemymeds.org.          This list is accurate as of: 7/17/17 11:38 AM.  Always use your most recent med list.                   Brand Name Dispense Instructions for use Diagnosis    ADVIL PO      Take 600 mg by mouth every 8 hours as needed for moderate pain        albuterol 108 (90 BASE) MCG/ACT Inhaler    albuterol    1 Inhaler    Inhale 2 puffs into the lungs every 4 hours as needed for shortness of breath / dyspnea        aspirin 81 MG EC tablet     90 tablet    Take 1 tablet (81 mg) by mouth daily    Controlled type 2 diabetes mellitus without complication, without long-term current use of insulin (H)       atorvastatin 40 MG tablet    LIPITOR    90 tablet    Take 1 tablet (40 mg) by mouth daily    Controlled type 2 diabetes mellitus without complication, without long-term current use of insulin (H)       blood glucose monitoring test strip    ONE TOUCH VERIO IQ    100 each    Use to test blood sugar 2 times daily.    Type 2 diabetes mellitus with hyperglycemia, without long-term current use of insulin (H)       COMBIVENT RESPIMAT  MCG/ACT inhaler   Generic drug:  Ipratropium-Albuterol     4 g    SEE NOTES    Other emphysema (H)       FLUoxetine 20 MG capsule    PROzac    90  capsule    Take 1 capsule (20 mg) by mouth daily    Other depression       lisinopril 10 MG tablet    PRINIVIL/ZESTRIL    30 tablet    Take 1 tablet (10 mg) by mouth daily    Benign essential hypertension       metFORMIN 500 MG tablet    GLUCOPHAGE    90 tablet    Take 1 tablet (500 mg) by mouth 3 times daily (with meals)    Type 2 diabetes mellitus without complication, without long-term current use of insulin (H)       PEPCID PO      Take 10 mg by mouth daily pepcid complete chewable        UNABLE TO FIND      Take 2 tablets by mouth At Bedtime MEDICATION NAME: Primatine tabs        ZYRTEC ALLERGY 10 MG tablet   Generic drug:  cetirizine      Take 10 mg by mouth daily

## 2017-07-17 NOTE — LETTER
7/26/2017     Sola Gan  [address]   [facility] [address]      Dear Sola:    Thank you for allowing me to participate in your care. Your recent test results were reviewed and listed below.      Your results are provided below for your review  The HPV portion of your pap smear was negative.    Thank you for choosing [facility]. As a result, please continue with the treatment plan discussed in the office. Return as discussed or sooner if symptoms worsens or fail to improve. If you have any further questions or concerns, please do not hesitate to contact us.      Sincerely,    Dr JULIET Recinos/Joan DE OLIVEIRA [place] [address]  [facility] [place] [address]  Phone: [phone]

## 2017-07-17 NOTE — LETTER
July 26, 2017      Sola Gan  [address]   [facility] [address]    Dear MsConiElvia,      I am happy to inform you that your recent cervical cancer screening test (PAP smear) was normal.      You will still need to return to the clinic every year for your annual exam and other preventive tests.     Please contact the clinic at [phone] if you have further questions.       Sincerely,      Favian Recinos MD

## 2017-07-18 ASSESSMENT — PATIENT HEALTH QUESTIONNAIRE - PHQ9: SUM OF ALL RESPONSES TO PHQ QUESTIONS 1-9: 1

## 2017-07-18 ASSESSMENT — ANXIETY QUESTIONNAIRES: GAD7 TOTAL SCORE: 4

## 2017-07-24 LAB
COPATH REPORT: NORMAL
PAP: NORMAL

## 2017-07-25 LAB
FINAL DIAGNOSIS: NORMAL
HPV HR 12 DNA CVX QL NAA+PROBE: NEGATIVE
HPV16 DNA SPEC QL NAA+PROBE: NEGATIVE
HPV18 DNA SPEC QL NAA+PROBE: NEGATIVE
SPECIMEN DESCRIPTION: NORMAL

## 2017-07-26 ENCOUNTER — TRANSFERRED RECORDS (OUTPATIENT)
Dept: HEALTH INFORMATION MANAGEMENT | Facility: HOSPITAL | Age: 67
End: 2017-07-26

## 2017-08-22 ENCOUNTER — TRANSFERRED RECORDS (OUTPATIENT)
Dept: HEALTH INFORMATION MANAGEMENT | Facility: HOSPITAL | Age: 67
End: 2017-08-22

## 2017-09-05 ENCOUNTER — OFFICE VISIT (OUTPATIENT)
Dept: SURGERY | Facility: OTHER | Age: 67
End: 2017-09-05
Attending: SURGERY
Payer: COMMERCIAL

## 2017-09-05 VITALS
WEIGHT: 218 LBS | SYSTOLIC BLOOD PRESSURE: 124 MMHG | HEART RATE: 86 BPM | HEIGHT: 61 IN | BODY MASS INDEX: 41.16 KG/M2 | DIASTOLIC BLOOD PRESSURE: 78 MMHG | OXYGEN SATURATION: 98 % | TEMPERATURE: 98.5 F

## 2017-09-05 DIAGNOSIS — C18.7 CANCER OF SIGMOID COLON (H): Primary | ICD-10-CM

## 2017-09-05 DIAGNOSIS — E66.01 MORBID OBESITY DUE TO EXCESS CALORIES (H): ICD-10-CM

## 2017-09-05 PROCEDURE — 99212 OFFICE O/P EST SF 10 MIN: CPT

## 2017-09-05 PROCEDURE — 99243 OFF/OP CNSLTJ NEW/EST LOW 30: CPT | Performed by: SURGERY

## 2017-09-05 PROCEDURE — 99203 OFFICE O/P NEW LOW 30 MIN: CPT

## 2017-09-05 ASSESSMENT — PAIN SCALES - GENERAL: PAINLEVEL: NO PAIN (0)

## 2017-09-05 NOTE — PROGRESS NOTES
[facility] Surgery Consultation    CC:  Adenocarcinoma of sigmoid colon     HPI:  This 66 year old year old female is seen at the request of Dr. Favian Recinos for evaluation and scheduling of port placement.     Ms. Gan came to the office today to be evaluated for a port placement due to her recently diagnosed colon cancer. This past spring she was seen for a colonoscopy and was found to have invasive adenocarcinoma. She then underwent robotic sigmoid colectomy and her pathology revealed a T2N1 adenocarcinoma. She had 3 of 18 lymph nodes involved. She is here now to discuss having a port placed for chemotherapy.     She states that since her operation she has been feeling well. She is able to get around without much difficulty. She has been increasing her walking to try and loose more weight and also cutting back on smoking. She currently smokes 7 cigarettes per day.     Ms. Gan states that she has continued to loose weight and is managing her newly diagnosed diabetes. She is looking forward to having her port placed and starting chemotherapy and beginning her road to recovery.    Past Medical History:   Diagnosis Date     COPD (chronic obstructive pulmonary disease) (H)      Diabetes (H)      H/O sleep apnea     tonsils, adnoids, and uvula removed     Uncomplicated asthma      Past Surgical History:   Procedure Laterality Date     APPENDECTOMY       CHOLECYSTECTOMY       COLONOSCOPY N/A 6/21/2017    Procedure: COLONOSCOPY;  COLONOSCOPY WITH POLYPECTOMY, BIOPSY, ENDOSCOPIC MARKER TATOOING;  Surgeon: Rigo Coreas DO;  Location: [facility] OR     ENT SURGERY      for MORA     GYN SURGERY       HERNIA REPAIR         HABITS:    Social History   Substance Use Topics     Smoking status: Current Every Day Smoker     Packs/day: 0.75     Years: 20.00     Smokeless tobacco: Never Used     Alcohol use No       Family History   Problem Relation Age of Onset     Other Cancer Mother      ovarian      Breast Cancer  "Mother      Bronchitis Father      Peripheral Neuropathy Father      Hypertension Father        REVIEW OF SYSTEMS:  Ten point review of systems negative except those mentioned in the HPI.     OBJECTIVE:    /78 (BP Location: Right arm, Patient Position: Chair, Cuff Size: Adult Large)  Pulse 86  Temp 98.5  F (36.9  C) (Tympanic)  Ht 5' 1\" (1.549 m)  Wt 218 lb (98.9 kg)  SpO2 98%  BMI 41.19 kg/m2    GENERAL: Generally appears well, in no distress with appropriate affect.  HEENT:   Sclerae anicteric - No cervical, supra/infraclavciular lymphadenopathy, no thyroid masses  Respiratory:  Lungs clear to ausculation bilaterally with good air excursion, no wheeze or rhonchi  Cardiovascular:  Regular Rate and Rhythm with no murmurs gallops or rubs, normal   Abdomen: obese, soft, incisions well healed with well formed healing ridge to right lower quadrant incision.  :  deferred  Extremities:  positive findings: venous stasis discoloration to bilateral lower extremities  Skin:  no suspicious lesions or rashes and hyperpigmentation - bilateral lower legs  Neurological: grossly intact    Psych:  Alert, oriented, affect appropriate with normal decision making ability.    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    PLAN:  Will plan on performing port placement later this week as she is planned to begin chemotherapy next Monday. Pros and cons were discussed with the patient as well as the risks associated with the procedure. Informed consent was done and she wishes to proceed forward.     Awais Ding MD, FACS    9/5/2017  10:42 AM    cc:  Dr. Favian Recinos  "

## 2017-09-05 NOTE — PATIENT INSTRUCTIONS
Thank you for allowing Dr. Ding and our surgical team to participate in your care.  If you have a scheduling or an appointment question please contact [facility] Health Unit Coordinator at her direct line [phone].   ALL nursing questions or concerns can be directed to Jenny at: [phone]     You are scheduled for a: port placement  Your procedure date is: 9/8/17      HOW TO PREPARE-        You need a friend or family member available to drive you home AND stay with you for 24 hours after you leave the hospital. You will not be allowed to drive yourself. IF you need to take a taxi or the bus you MUST have a responsible person to ride with you. YOUR PROCEDURE WILL BE CANCELLED IF YOU DO NOT HAVE A RESPONSIBLE ADULT TO DRIVE YOU HOME.       You CANNOT have anything to eat or drink after midnight the night before your surgery, ncluding water and coffee. Your stomach needs to be completely empty. Do NOT chew gum, suck on hard candy, or smoke. You can brush your teeth the morning of surgery.       You need to call our Surgery Education Nurses 1-2 weeks prior to your surgery date at  [phone] or toll free [phone]. Please have you medication and allergy lists ready.      Stop your aspirin or other NSAIDs(Ibuprofen, Motrin, Aleve, Celebrex, Naproxen, etc...) 7 days before your surgery.      Hospital admitting will call you the day before your surgery with your arrival time. If you are scheduled on a Monday admitting will call you the Friday before.      Please call your primary care physician if you should become ill within 24 hours of scheduled surgery. (ex.vomiting, diarrhea, fever)          You will need to wash the night before AND the morning of you procedure with the supplied Hibiclens. Wash your Surgical area with your bare hands, apply friction and rinse. KEEP IT AWAY FROM YOUR EYES, EARS, NOSE AND MOUTH.     Surgery Education will contact you the day before your procedure between the hours of  noon and 5 pm with the time you need to register in admitting at the hospital. Call Jenny with any questions [phone]    You will stop your aspirin as of today    Day of surgery you will hold all medications

## 2017-09-05 NOTE — MR AVS SNAPSHOT
After Visit Summary   9/5/2017    Sola Gan    MRN: [id number]           Patient Information     Date Of Birth          1950        Visit Information        Provider Department      9/5/2017 10:00 AM Awais Ding MD [facility] [place]        Today's Diagnoses     Cancer of sigmoid colon (H)    -  1      Care Instructions    Thank you for allowing Dr. Ding and our surgical team to participate in your care.  If you have a scheduling or an appointment question please contact [facility] Unit Coordinator at her direct line [phone].   ALL nursing questions or concerns can be directed to Jenny at: [phone]     You are scheduled for a: port placement  Your procedure date is: 9/8/17      HOW TO PREPARE-        You need a friend or family member available to drive you home AND stay with you for 24 hours after you leave the hospital. You will not be allowed to drive yourself. IF you need to take a taxi or the bus you MUST have a responsible person to ride with you. YOUR PROCEDURE WILL BE CANCELLED IF YOU DO NOT HAVE A RESPONSIBLE ADULT TO DRIVE YOU HOME.       You CANNOT have anything to eat or drink after midnight the night before your surgery, ncluding water and coffee. Your stomach needs to be completely empty. Do NOT chew gum, suck on hard candy, or smoke. You can brush your teeth the morning of surgery.       You need to call our Surgery Education Nurses 1-2 weeks prior to your surgery date at  [phone] or toll free [phone]. Please have you medication and allergy lists ready.      Stop your aspirin or other NSAIDs(Ibuprofen, Motrin, Aleve, Celebrex, Naproxen, etc...) 7 days before your surgery.      Hospital admitting will call you the day before your surgery with your arrival time. If you are scheduled on a Monday admitting will call you the Friday before.      Please call your primary care physician if you should become ill within 24 hours of  scheduled surgery. (ex.vomiting, diarrhea, fever)          You will need to wash the night before AND the morning of you procedure with the supplied Hibiclens. Wash your Surgical area with your bare hands, apply friction and rinse. KEEP IT AWAY FROM YOUR EYES, EARS, NOSE AND MOUTH.     Surgery Education will contact you the day before your procedure between the hours of noon and 5 pm with the time you need to register in admitting at the hospital. Call Jenny with any questions [phone]    You will stop your aspirin as of today    Day of surgery you will hold all medications            Follow-ups after your visit        Who to contact     If you have questions or need follow up information about today's clinic visit or your schedule please contact [facility] directly at [phone].  Normal or non-critical lab and imaging results will be communicated to you by tidyhart, letter or phone within 4 business days after the clinic has received the results. If you do not hear from us within 7 days, please contact the clinic through tidyhart or phone. If you have a critical or abnormal lab result, we will notify you by phone as soon as possible.  Submit refill requests through [employer] or call your pharmacy and they will forward the refill request to us. Please allow 3 business days for your refill to be completed.          Additional Information About Your Visit        [employer] Information     [employer] gives you secure access to your electronic health record. If you see a primary care provider, you can also send messages to your care team and make appointments. If you have questions, please call your primary care clinic.  If you do not have a primary care provider, please call [phone] and they will assist you.        Care EveryWhere ID     This is your Care EveryWhere ID. This could be used by other organizations to access your [place] medical records  [id number]        Your Vitals Were     Pulse  "Temperature Height Pulse Oximetry BMI (Body Mass Index)       86 98.5  F (36.9  C) (Tympanic) 5' 1\" (1.549 m) 98% 41.19 kg/m2        Blood Pressure from Last 3 Encounters:   09/05/17 124/78   07/17/17 126/66   06/27/17 146/82    Weight from Last 3 Encounters:   09/05/17 218 lb (98.9 kg)   07/17/17 229 lb (103.9 kg)   06/27/17 233 lb (105.7 kg)              Today, you had the following     No orders found for display       Primary Care Provider Office Phone # Fax #    Favian Recinos -[phone]       [facility] [address]  [facility] [place] [address]        Equal Access to Services     LUNA WATKINS : Hadii nicol gaffney hadasho Soomaali, waaxda luqadaha, qaybta kaalmada adeegyada, waxmarie nye haysimone keller . So [facility] [phone].    ATENCIÓN: Si habla español, tiene a sanchez disposición servicios gratuitos de asistencia lingüística. Llame al [phone].    We comply with applicable federal civil rights laws and [place] laws. We do not discriminate on the basis of race, color, national origin, age, disability sex, sexual orientation or gender identity.            Thank you!     [facility] you [facility]  [facility] your care. Our goal is always to provide you with excellent care. Hearing back from our patients is one way we can continue to improve our services. Please take a few minutes to complete the written survey that you may receive in the mail after your visit with us. Thank you!             Your Updated Medication List - Protect others around you: Learn how to safely use, store and throw away your medicines at www.disposemymeds.org.          This list is accurate as of: 9/5/17 10:31 AM.  Always use your most recent med list.                   Brand Name Dispense Instructions for use Diagnosis    ADVIL PO      Take 600 mg by mouth every 8 hours as needed for moderate pain        albuterol 108 (90 BASE) MCG/ACT Inhaler    PROAIR HFA    1 Inhaler    Inhale 2 puffs " into the lungs every 4 hours as needed for shortness of breath / dyspnea        aspirin 81 MG EC tablet     90 tablet    Take 1 tablet (81 mg) by mouth daily    Controlled type 2 diabetes mellitus without complication, without long-term current use of insulin (H)       atorvastatin 40 MG tablet    LIPITOR    90 tablet    Take 1 tablet (40 mg) by mouth daily    Controlled type 2 diabetes mellitus without complication, without long-term current use of insulin (H)       blood glucose monitoring test strip    ONE TOUCH VERIO IQ    100 each    Use to test blood sugar 2 times daily.    Type 2 diabetes mellitus with hyperglycemia, without long-term current use of insulin (H)       COMBIVENT RESPIMAT  MCG/ACT inhaler   Generic drug:  Ipratropium-Albuterol     4 g    SEE NOTES    Other emphysema (H)       FLUoxetine 20 MG capsule    PROzac    90 capsule    Take 1 capsule (20 mg) by mouth daily    Other depression       lisinopril 10 MG tablet    PRINIVIL/ZESTRIL    30 tablet    Take 1 tablet (10 mg) by mouth daily    Benign essential hypertension       metFORMIN 500 MG tablet    GLUCOPHAGE    90 tablet    Take 1 tablet (500 mg) by mouth 3 times daily (with meals)    Type 2 diabetes mellitus without complication, without long-term current use of insulin (H)       PEPCID PO      Take 10 mg by mouth daily pepcid complete chewable        UNABLE TO FIND      Take 2 tablets by mouth At Bedtime MEDICATION NAME: Primatine tabs        ZYRTEC ALLERGY 10 MG tablet   Generic drug:  cetirizine      Take 10 mg by mouth daily

## 2017-09-06 NOTE — H&P (VIEW-ONLY)
[facility] Surgery Consultation    CC:  Adenocarcinoma of sigmoid colon     HPI:  This 66 year old year old female is seen at the request of Dr. Favian Recinos for evaluation and scheduling of port placement.     Ms. Gan came to the office today to be evaluated for a port placement due to her recently diagnosed colon cancer. This past spring she was seen for a colonoscopy and was found to have invasive adenocarcinoma. She then underwent robotic sigmoid colectomy and her pathology revealed a T2N1 adenocarcinoma. She had 3 of 18 lymph nodes involved. She is here now to discuss having a port placed for chemotherapy.     She states that since her operation she has been feeling well. She is able to get around without much difficulty. She has been increasing her walking to try and loose more weight and also cutting back on smoking. She currently smokes 7 cigarettes per day.     Ms. Gan states that she has continued to loose weight and is managing her newly diagnosed diabetes. She is looking forward to having her port placed and starting chemotherapy and beginning her road to recovery.    Past Medical History:   Diagnosis Date     COPD (chronic obstructive pulmonary disease) (H)      Diabetes (H)      H/O sleep apnea     tonsils, adnoids, and uvula removed     Uncomplicated asthma      Past Surgical History:   Procedure Laterality Date     APPENDECTOMY       CHOLECYSTECTOMY       COLONOSCOPY N/A 6/21/2017    Procedure: COLONOSCOPY;  COLONOSCOPY WITH POLYPECTOMY, BIOPSY, ENDOSCOPIC MARKER TATOOING;  Surgeon: Rigo Coreas DO;  Location: [facility] OR     ENT SURGERY      for MORA     GYN SURGERY       HERNIA REPAIR         HABITS:    Social History   Substance Use Topics     Smoking status: Current Every Day Smoker     Packs/day: 0.75     Years: 20.00     Smokeless tobacco: Never Used     Alcohol use No       Family History   Problem Relation Age of Onset     Other Cancer Mother      ovarian      Breast Cancer  "Mother      Bronchitis Father      Peripheral Neuropathy Father      Hypertension Father        REVIEW OF SYSTEMS:  Ten point review of systems negative except those mentioned in the HPI.     OBJECTIVE:    /78 (BP Location: Right arm, Patient Position: Chair, Cuff Size: Adult Large)  Pulse 86  Temp 98.5  F (36.9  C) (Tympanic)  Ht 5' 1\" (1.549 m)  Wt 218 lb (98.9 kg)  SpO2 98%  BMI 41.19 kg/m2    GENERAL: Generally appears well, in no distress with appropriate affect.  HEENT:   Sclerae anicteric - No cervical, supra/infraclavciular lymphadenopathy, no thyroid masses  Respiratory:  Lungs clear to ausculation bilaterally with good air excursion, no wheeze or rhonchi  Cardiovascular:  Regular Rate and Rhythm with no murmurs gallops or rubs, normal   Abdomen: obese, soft, incisions well healed with well formed healing ridge to right lower quadrant incision.  :  deferred  Extremities:  positive findings: venous stasis discoloration to bilateral lower extremities  Skin:  no suspicious lesions or rashes and hyperpigmentation - bilateral lower legs  Neurological: grossly intact    Psych:  Alert, oriented, affect appropriate with normal decision making ability.    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    PLAN:  Will plan on performing port placement later this week as she is planned to begin chemotherapy next Monday. Pros and cons were discussed with the patient as well as the risks associated with the procedure. Informed consent was done and she wishes to proceed forward.     Awais Ding MD, FACS    9/5/2017  10:42 AM    cc:  Dr. Favian Recinos  "

## 2017-09-08 ENCOUNTER — ANESTHESIA EVENT (OUTPATIENT)
Dept: SURGERY | Facility: HOSPITAL | Age: 67
End: 2017-09-08
Payer: COMMERCIAL

## 2017-09-08 ENCOUNTER — APPOINTMENT (OUTPATIENT)
Dept: GENERAL RADIOLOGY | Facility: HOSPITAL | Age: 67
End: 2017-09-08
Attending: SURGERY
Payer: COMMERCIAL

## 2017-09-08 ENCOUNTER — ANESTHESIA (OUTPATIENT)
Dept: SURGERY | Facility: HOSPITAL | Age: 67
End: 2017-09-08
Payer: COMMERCIAL

## 2017-09-08 ENCOUNTER — HOSPITAL ENCOUNTER (OUTPATIENT)
Facility: HOSPITAL | Age: 67
Discharge: HOME | End: 2017-09-08
Attending: SURGERY | Admitting: SURGERY
Payer: COMMERCIAL

## 2017-09-08 VITALS
RESPIRATION RATE: 18 BRPM | BODY MASS INDEX: 41.08 KG/M2 | HEIGHT: 61 IN | DIASTOLIC BLOOD PRESSURE: 83 MMHG | TEMPERATURE: 97.3 F | SYSTOLIC BLOOD PRESSURE: 123 MMHG | OXYGEN SATURATION: 95 % | WEIGHT: 217.6 LBS

## 2017-09-08 DIAGNOSIS — C18.7 CANCER OF SIGMOID COLON (H): Primary | ICD-10-CM

## 2017-09-08 PROCEDURE — 40000832 H STATISTIC POST OPERATIVE IMAGING: Mod: TC

## 2017-09-08 PROCEDURE — C1788 PORT, INDWELLING, IMP: HCPCS | Performed by: SURGERY

## 2017-09-08 PROCEDURE — 25000125 ZZHC RX 250: Performed by: NURSE ANESTHETIST, CERTIFIED REGISTERED

## 2017-09-08 PROCEDURE — 25000128 H RX IP 250 OP 636: Performed by: SURGERY

## 2017-09-08 PROCEDURE — 27210794 ZZH OR GENERAL SUPPLY STERILE: Performed by: SURGERY

## 2017-09-08 PROCEDURE — 37000008 ZZH ANESTHESIA TECHNICAL FEE, 1ST 30 MIN: Performed by: SURGERY

## 2017-09-08 PROCEDURE — 25000128 H RX IP 250 OP 636: Performed by: NURSE ANESTHETIST, CERTIFIED REGISTERED

## 2017-09-08 PROCEDURE — 36000054 ZZH SURGERY LEVEL 2 W FLUORO 1ST 30 MIN: Performed by: SURGERY

## 2017-09-08 PROCEDURE — 25000125 ZZHC RX 250: Performed by: SURGERY

## 2017-09-08 PROCEDURE — 25000125 ZZHC RX 250: Performed by: ANESTHESIOLOGY

## 2017-09-08 PROCEDURE — 01999 UNLISTED ANES PROCEDURE: CPT | Performed by: NURSE ANESTHETIST, CERTIFIED REGISTERED

## 2017-09-08 PROCEDURE — 36000052 ZZH SURGERY LEVEL 2 EA 15 ADDTL MIN: Performed by: SURGERY

## 2017-09-08 PROCEDURE — 40000305 ZZH STATISTIC PRE PROC ASSESS I: Performed by: SURGERY

## 2017-09-08 PROCEDURE — 36561 INSERT TUNNELED CV CATH: CPT | Performed by: SURGERY

## 2017-09-08 PROCEDURE — 27110028 ZZH OR GENERAL SUPPLY NON-STERILE: Performed by: SURGERY

## 2017-09-08 PROCEDURE — 36561 INSERT TUNNELED CV CATH: CPT | Performed by: ANESTHESIOLOGY

## 2017-09-08 PROCEDURE — 37000009 ZZH ANESTHESIA TECHNICAL FEE, EACH ADDTL 15 MIN: Performed by: SURGERY

## 2017-09-08 PROCEDURE — 25000128 H RX IP 250 OP 636: Performed by: ANESTHESIOLOGY

## 2017-09-08 PROCEDURE — 71000027 ZZH RECOVERY PHASE 2 EACH 15 MINS: Performed by: SURGERY

## 2017-09-08 PROCEDURE — 40000278 ZZH STATISTIC SURG C-ARM <5 MIN FLUORO(NON-BILLABLE): Mod: TC

## 2017-09-08 DEVICE — PORT-IMPLANTABLE POWER PORT: Type: IMPLANTABLE DEVICE | Status: FUNCTIONAL

## 2017-09-08 RX ORDER — BUPIVACAINE HYDROCHLORIDE AND EPINEPHRINE 5; 5 MG/ML; UG/ML
INJECTION, SOLUTION EPIDURAL; INTRACAUDAL; PERINEURAL PRN
Status: DISCONTINUED | OUTPATIENT
Start: 2017-09-08 | End: 2017-09-08 | Stop reason: HOSPADM

## 2017-09-08 RX ORDER — KETOROLAC TROMETHAMINE 30 MG/ML
30 INJECTION, SOLUTION INTRAMUSCULAR; INTRAVENOUS EVERY 6 HOURS PRN
Status: DISCONTINUED | OUTPATIENT
Start: 2017-09-08 | End: 2017-09-08 | Stop reason: HOSPADM

## 2017-09-08 RX ORDER — FENTANYL CITRATE 50 UG/ML
INJECTION, SOLUTION INTRAMUSCULAR; INTRAVENOUS PRN
Status: DISCONTINUED | OUTPATIENT
Start: 2017-09-08 | End: 2017-09-08

## 2017-09-08 RX ORDER — HYDRALAZINE HYDROCHLORIDE 20 MG/ML
2.5-5 INJECTION INTRAMUSCULAR; INTRAVENOUS EVERY 10 MIN PRN
Status: DISCONTINUED | OUTPATIENT
Start: 2017-09-08 | End: 2017-09-08 | Stop reason: HOSPADM

## 2017-09-08 RX ORDER — KETAMINE HYDROCHLORIDE 10 MG/ML
INJECTION, SOLUTION INTRAMUSCULAR; INTRAVENOUS PRN
Status: DISCONTINUED | OUTPATIENT
Start: 2017-09-08 | End: 2017-09-08

## 2017-09-08 RX ORDER — FENTANYL CITRATE 50 UG/ML
25-50 INJECTION, SOLUTION INTRAMUSCULAR; INTRAVENOUS
Status: DISCONTINUED | OUTPATIENT
Start: 2017-09-08 | End: 2017-09-08 | Stop reason: HOSPADM

## 2017-09-08 RX ORDER — ONDANSETRON 4 MG/1
4 TABLET, ORALLY DISINTEGRATING ORAL EVERY 30 MIN PRN
Status: DISCONTINUED | OUTPATIENT
Start: 2017-09-08 | End: 2017-09-08 | Stop reason: HOSPADM

## 2017-09-08 RX ORDER — LIDOCAINE HYDROCHLORIDE 20 MG/ML
INJECTION, SOLUTION INFILTRATION; PERINEURAL PRN
Status: DISCONTINUED | OUTPATIENT
Start: 2017-09-08 | End: 2017-09-08

## 2017-09-08 RX ORDER — NALOXONE HYDROCHLORIDE 0.4 MG/ML
.1-.4 INJECTION, SOLUTION INTRAMUSCULAR; INTRAVENOUS; SUBCUTANEOUS
Status: DISCONTINUED | OUTPATIENT
Start: 2017-09-08 | End: 2017-09-08 | Stop reason: HOSPADM

## 2017-09-08 RX ORDER — CEFAZOLIN SODIUM 2 G/100ML
2 INJECTION, SOLUTION INTRAVENOUS
Status: COMPLETED | OUTPATIENT
Start: 2017-09-08 | End: 2017-09-08

## 2017-09-08 RX ORDER — PROMETHAZINE HYDROCHLORIDE 25 MG/ML
12.5 INJECTION, SOLUTION INTRAMUSCULAR; INTRAVENOUS
Status: DISCONTINUED | OUTPATIENT
Start: 2017-09-08 | End: 2017-09-08 | Stop reason: HOSPADM

## 2017-09-08 RX ORDER — SODIUM CHLORIDE, SODIUM LACTATE, POTASSIUM CHLORIDE, CALCIUM CHLORIDE 600; 310; 30; 20 MG/100ML; MG/100ML; MG/100ML; MG/100ML
INJECTION, SOLUTION INTRAVENOUS CONTINUOUS
Status: DISCONTINUED | OUTPATIENT
Start: 2017-09-08 | End: 2017-09-08 | Stop reason: HOSPADM

## 2017-09-08 RX ORDER — ONDANSETRON 2 MG/ML
4 INJECTION INTRAMUSCULAR; INTRAVENOUS EVERY 30 MIN PRN
Status: DISCONTINUED | OUTPATIENT
Start: 2017-09-08 | End: 2017-09-08 | Stop reason: HOSPADM

## 2017-09-08 RX ORDER — ALBUTEROL SULFATE 0.83 MG/ML
2.5 SOLUTION RESPIRATORY (INHALATION) EVERY 4 HOURS PRN
Status: DISCONTINUED | OUTPATIENT
Start: 2017-09-08 | End: 2017-09-08 | Stop reason: HOSPADM

## 2017-09-08 RX ORDER — HEPARIN SODIUM 1000 [USP'U]/ML
INJECTION, SOLUTION INTRAVENOUS; SUBCUTANEOUS PRN
Status: DISCONTINUED | OUTPATIENT
Start: 2017-09-08 | End: 2017-09-08 | Stop reason: HOSPADM

## 2017-09-08 RX ORDER — PROPOFOL 10 MG/ML
INJECTION, EMULSION INTRAVENOUS CONTINUOUS PRN
Status: DISCONTINUED | OUTPATIENT
Start: 2017-09-08 | End: 2017-09-08

## 2017-09-08 RX ORDER — MEPERIDINE HYDROCHLORIDE 25 MG/ML
12.5 INJECTION INTRAMUSCULAR; INTRAVENOUS; SUBCUTANEOUS
Status: DISCONTINUED | OUTPATIENT
Start: 2017-09-08 | End: 2017-09-08 | Stop reason: HOSPADM

## 2017-09-08 RX ORDER — HEPARIN SODIUM 1000 [USP'U]/ML
INJECTION, SOLUTION INTRAVENOUS; SUBCUTANEOUS
Status: DISCONTINUED
Start: 2017-09-08 | End: 2017-09-08 | Stop reason: HOSPADM

## 2017-09-08 RX ORDER — SCOLOPAMINE TRANSDERMAL SYSTEM 1 MG/1
1 PATCH, EXTENDED RELEASE TRANSDERMAL ONCE
Status: COMPLETED | OUTPATIENT
Start: 2017-09-08 | End: 2017-09-08

## 2017-09-08 RX ORDER — ONDANSETRON 4 MG/1
4 TABLET, FILM COATED ORAL EVERY 6 HOURS PRN
Qty: 10 TABLET | Refills: 0 | Status: SHIPPED | OUTPATIENT
Start: 2017-09-08 | End: 2018-10-10

## 2017-09-08 RX ORDER — LABETALOL HYDROCHLORIDE 5 MG/ML
10 INJECTION, SOLUTION INTRAVENOUS
Status: DISCONTINUED | OUTPATIENT
Start: 2017-09-08 | End: 2017-09-08 | Stop reason: HOSPADM

## 2017-09-08 RX ORDER — DEXAMETHASONE SODIUM PHOSPHATE 4 MG/ML
4 INJECTION, SOLUTION INTRA-ARTICULAR; INTRALESIONAL; INTRAMUSCULAR; INTRAVENOUS; SOFT TISSUE EVERY 10 MIN PRN
Status: DISCONTINUED | OUTPATIENT
Start: 2017-09-08 | End: 2017-09-08 | Stop reason: HOSPADM

## 2017-09-08 RX ORDER — LIDOCAINE HYDROCHLORIDE 10 MG/ML
INJECTION, SOLUTION EPIDURAL; INFILTRATION; INTRACAUDAL; PERINEURAL
Status: DISPENSED
Start: 2017-09-08 | End: 2017-09-08

## 2017-09-08 RX ORDER — PROPOFOL 10 MG/ML
INJECTION, EMULSION INTRAVENOUS PRN
Status: DISCONTINUED | OUTPATIENT
Start: 2017-09-08 | End: 2017-09-08

## 2017-09-08 RX ORDER — BUPIVACAINE HYDROCHLORIDE 2.5 MG/ML
INJECTION, SOLUTION EPIDURAL; INFILTRATION; INTRACAUDAL
Status: DISCONTINUED
Start: 2017-09-08 | End: 2017-09-08 | Stop reason: WASHOUT

## 2017-09-08 RX ORDER — GLYCOPYRROLATE 0.2 MG/ML
INJECTION, SOLUTION INTRAMUSCULAR; INTRAVENOUS PRN
Status: DISCONTINUED | OUTPATIENT
Start: 2017-09-08 | End: 2017-09-08

## 2017-09-08 RX ORDER — OXYCODONE HYDROCHLORIDE 5 MG/1
5 TABLET ORAL EVERY 4 HOURS PRN
Qty: 20 TABLET | Refills: 0 | Status: SHIPPED | OUTPATIENT
Start: 2017-09-08 | End: 2017-10-25

## 2017-09-08 RX ORDER — BUPIVACAINE HYDROCHLORIDE AND EPINEPHRINE 5; 5 MG/ML; UG/ML
INJECTION, SOLUTION PERINEURAL
Status: DISCONTINUED
Start: 2017-09-08 | End: 2017-09-08 | Stop reason: HOSPADM

## 2017-09-08 RX ADMIN — PROPOFOL 20 MG: 10 INJECTION, EMULSION INTRAVENOUS at 08:12

## 2017-09-08 RX ADMIN — CEFAZOLIN SODIUM 2 G: 2 INJECTION, SOLUTION INTRAVENOUS at 08:05

## 2017-09-08 RX ADMIN — SCOPALAMINE 1 PATCH: 1 PATCH, EXTENDED RELEASE TRANSDERMAL at 07:45

## 2017-09-08 RX ADMIN — FENTANYL CITRATE 50 MCG: 50 INJECTION, SOLUTION INTRAMUSCULAR; INTRAVENOUS at 08:13

## 2017-09-08 RX ADMIN — SODIUM CHLORIDE, POTASSIUM CHLORIDE, SODIUM LACTATE AND CALCIUM CHLORIDE: 600; 310; 30; 20 INJECTION, SOLUTION INTRAVENOUS at 07:46

## 2017-09-08 RX ADMIN — FENTANYL CITRATE 50 MCG: 50 INJECTION, SOLUTION INTRAMUSCULAR; INTRAVENOUS at 08:03

## 2017-09-08 RX ADMIN — MIDAZOLAM HYDROCHLORIDE 2 MG: 1 INJECTION, SOLUTION INTRAMUSCULAR; INTRAVENOUS at 08:02

## 2017-09-08 RX ADMIN — PROPOFOL 100 MCG/KG/MIN: 10 INJECTION, EMULSION INTRAVENOUS at 08:13

## 2017-09-08 RX ADMIN — KETAMINE HYDROCHLORIDE 20 MG: 10 INJECTION, SOLUTION INTRAMUSCULAR; INTRAVENOUS at 08:11

## 2017-09-08 RX ADMIN — GLYCOPYRROLATE 0.1 MG: 0.2 INJECTION, SOLUTION INTRAMUSCULAR; INTRAVENOUS at 08:31

## 2017-09-08 RX ADMIN — LIDOCAINE HYDROCHLORIDE 40 MG: 20 INJECTION, SOLUTION INFILTRATION; PERINEURAL at 08:11

## 2017-09-08 RX ADMIN — SODIUM CHLORIDE, POTASSIUM CHLORIDE, SODIUM LACTATE AND CALCIUM CHLORIDE: 600; 310; 30; 20 INJECTION, SOLUTION INTRAVENOUS at 07:35

## 2017-09-08 ASSESSMENT — LIFESTYLE VARIABLES: TOBACCO_USE: 1

## 2017-09-08 ASSESSMENT — COPD QUESTIONNAIRES: COPD: 1

## 2017-09-08 NOTE — IP AVS SNAPSHOT
MRN:[id number]                      After Visit Summary   9/8/2017    Sola Gan    MRN: [id number]           Thank you!     Thank you for choosing [facility] for your care. Our goal is always to provide you with excellent care. Hearing back from our patients is one way we can continue to improve our services. Please take a few minutes to complete the written survey that you may receive in the mail after you visit with us. Thank you!        Patient Information     Date Of Birth          1950        About your hospital stay     You were admitted on:  September 8, 2017 You last received care in the:  [facility]op/Phase II    You were discharged on:  September 8, 2017       Who to Call     For medical emergencies, please call 911.  For non-urgent questions about your medical care, please call your primary care provider or clinic, [phone]  For questions related to your surgery, please call your surgery clinic        Attending Provider     Provider Specialty    Awais Ding MD Surgery       Primary Care Provider Office Phone # Fax #    Favian Recinos [phone]      Your next 10 appointments already scheduled     Sep 22, 2017 10:30 AM CDT   (Arrive by 10:15 AM)   Post Op with Awais Ding MD   [facility] ([facility] )    [address] [place] [address]   [phone]              Further instructions from your care team           Post-Anesthesia Patient Instructions    IMMEDIATELY FOLLOWING SURGERY:  Do not drive or operate machinery for the first twenty four hours after surgery.  Do not make any important decisions for twenty four hours after surgery or while taking narcotic pain medications or sedatives.  If you develop intractable nausea and vomiting or a severe headache please notify your doctor immediately.    FOLLOW-UP:  Please make an appointment with your surgeon as instructed. You do not need to follow up with  "anesthesia unless specifically instructed to do so.    WOUND CARE INSTRUCTIONS (if applicable):  Keep a dry clean dressing on the anesthesia/puncture wound site if there is drainage.  Once the wound has quit draining you may leave it open to air.  Generally you should leave the bandage intact for twenty four hours unless there is drainage.  If the epidural site drains for more than 36-48 hours please call the anesthesia department.    QUESTIONS?:  Please feel free to call your physician or the hospital  if you have any questions, and they will be happy to assist you.       Pending Results     Date and Time Order Name Status Description    9/8/2017 0934 XR Post Procedure [facility] [place] In process     9/8/2017 0738 XR Surgery [facility] Fluoro L/T 5 Min In process             Admission Information     Date & Time Provider Department Dept. Phone    9/8/2017 Awais Ding MD [facility] Preop/Phase [phone]      Your Vitals Were     Blood Pressure Temperature Respirations Height Weight Pulse Oximetry    133/84 98.2  F (36.8  C) (Oral) 18 1.549 m (5' 1\") 98.7 kg (217 lb 9.6 oz) 100%    BMI (Body Mass Index)                   41.12 kg/m2           MyChart Information     [employer] gives you secure access to your electronic health record. If you see a primary care provider, you can also send messages to your care team and make appointments. If you have questions, please call your primary care clinic.  If you do not have a primary care provider, please call [phone] and they will assist you.        Care EveryWhere ID     This is your Care EveryWhere ID. This could be used by other organizations to access your [facility] medical records  [id number]        Equal Access to Services     [facility]: Hadii nicol Mir, waduglasda luqadaha, qaybta kaalmadai rose. So [facility] [phone].    ATENCIÓN: Si habla español, tiene a sanchez disposición servicios gratuitos de " asistencia lingüística. Ritesh al [phone].    We comply with applicable federal civil rights laws and [place] laws. We do not discriminate on the basis of race, color, national origin, age, disability sex, sexual orientation or gender identity.               Review of your medicines      START taking        Dose / Directions    ondansetron 4 MG tablet   Commonly known as:  ZOFRAN   Used for:  Cancer of sigmoid colon (H)        Dose:  4 mg   Take 1 tablet (4 mg) by mouth every 6 hours as needed for nausea   Quantity:  10 tablet   Refills:  0       oxyCODONE 5 MG IR tablet   Commonly known as:  ROXICODONE   Used for:  Cancer of sigmoid colon (H)        Dose:  5 mg   Take 1 tablet (5 mg) by mouth every 4 hours as needed for pain maximum 5 tablet(s) per day, patient is to use 2.5 - 5 mg per dose   Quantity:  20 tablet   Refills:  0         CONTINUE these medicines which have NOT CHANGED        Dose / Directions    ADVIL PO        Dose:  600 mg   Take 600 mg by mouth every 8 hours as needed for moderate pain   Refills:  0       albuterol 108 (90 BASE) MCG/ACT Inhaler   Commonly known as:  PROAIR HFA        Dose:  2 puff   Inhale 2 puffs into the lungs every 4 hours as needed for shortness of breath / dyspnea   Quantity:  1 Inhaler   Refills:  0       aspirin 81 MG EC tablet   Used for:  Controlled type 2 diabetes mellitus without complication, without long-term current use of insulin (H)        Dose:  81 mg   Take 1 tablet (81 mg) by mouth daily   Quantity:  90 tablet   Refills:  3       atorvastatin 40 MG tablet   Commonly known as:  LIPITOR   Used for:  Controlled type 2 diabetes mellitus without complication, without long-term current use of insulin (H)        Dose:  40 mg   Take 1 tablet (40 mg) by mouth daily   Quantity:  90 tablet   Refills:  3       blood glucose monitoring test strip   Commonly known as:  ONE TOUCH VERIO IQ   Used for:  Type 2 diabetes mellitus with hyperglycemia, without long-term current  use of insulin (H)        Use to test blood sugar 2 times daily.   Quantity:  100 each   Refills:  10       COMBIVENT RESPIMAT  MCG/ACT inhaler   Used for:  Other emphysema (H)   Generic drug:  Ipratropium-Albuterol        SEE NOTES   Quantity:  4 g   Refills:  1       FLUoxetine 20 MG capsule   Commonly known as:  PROzac   Used for:  Other depression        Dose:  20 mg   Take 1 capsule (20 mg) by mouth daily   Quantity:  90 capsule   Refills:  1       lisinopril 10 MG tablet   Commonly known as:  PRINIVIL/ZESTRIL   Used for:  Benign essential hypertension        Dose:  10 mg   Take 1 tablet (10 mg) by mouth daily   Quantity:  30 tablet   Refills:  8       metFORMIN 500 MG tablet   Commonly known as:  GLUCOPHAGE   Used for:  Type 2 diabetes mellitus without complication, without long-term current use of insulin (H)        Dose:  500 mg   Take 1 tablet (500 mg) by mouth 3 times daily (with meals)   Quantity:  90 tablet   Refills:  3       PEPCID PO        Dose:  10 mg   Take 10 mg by mouth daily pepcid complete chewable   Refills:  0       UNABLE TO FIND        Dose:  2 tablet   Take 2 tablets by mouth At Bedtime MEDICATION NAME: Primatine tabs   Refills:  0       ZYRTEC ALLERGY 10 MG tablet   Generic drug:  cetirizine        Dose:  10 mg   Take 10 mg by mouth daily   Refills:  0            Where to get your medicines      These medications were sent to [facility] [address] [place] [address], [place] - [address] AT [facility] [address]  [address], [facility] [address]     Phone:  [phone]     ondansetron 4 MG tablet         Some of these will need a paper prescription and others can be bought over the counter. Ask your nurse if you have questions.     Bring a paper prescription for each of these medications     oxyCODONE 5 MG IR tablet                Protect others around you: Learn how to safely use, store and throw away your medicines at www.disposemymeds.org.             Medication List: This is a  list of all your medications and when to take them. Check marks below indicate your daily home schedule. Keep this list as a reference.      Medications           Morning Afternoon Evening Bedtime As Needed    ADVIL PO   Take 600 mg by mouth every 8 hours as needed for moderate pain                                albuterol 108 (90 BASE) MCG/ACT Inhaler   Commonly known as:  PROAIR HFA   Inhale 2 puffs into the lungs every 4 hours as needed for shortness of breath / dyspnea                                aspirin 81 MG EC tablet   Take 1 tablet (81 mg) by mouth daily                                atorvastatin 40 MG tablet   Commonly known as:  LIPITOR   Take 1 tablet (40 mg) by mouth daily                                blood glucose monitoring test strip   Commonly known as:  ONE TOUCH VERIO IQ   Use to test blood sugar 2 times daily.                                COMBIVENT RESPIMAT  MCG/ACT inhaler   SEE NOTES   Generic drug:  Ipratropium-Albuterol                                FLUoxetine 20 MG capsule   Commonly known as:  PROzac   Take 1 capsule (20 mg) by mouth daily                                lisinopril 10 MG tablet   Commonly known as:  PRINIVIL/ZESTRIL   Take 1 tablet (10 mg) by mouth daily                                metFORMIN 500 MG tablet   Commonly known as:  GLUCOPHAGE   Take 1 tablet (500 mg) by mouth 3 times daily (with meals)                                ondansetron 4 MG tablet   Commonly known as:  ZOFRAN   Take 1 tablet (4 mg) by mouth every 6 hours as needed for nausea                                oxyCODONE 5 MG IR tablet   Commonly known as:  ROXICODONE   Take 1 tablet (5 mg) by mouth every 4 hours as needed for pain maximum 5 tablet(s) per day, patient is to use 2.5 - 5 mg per dose                                PEPCID PO   Take 10 mg by mouth daily pepcid complete chewable                                UNABLE TO FIND   Take 2 tablets by mouth At Bedtime MEDICATION NAME:  Primatine tabs                                ZYRTEC ALLERGY 10 MG tablet   Take 10 mg by mouth daily   Generic drug:  cetirizine                                          More Information        Vascular Access Port Implantation   Port implantation is surgery to place (implant) a port under the skin. For vascular access, it is placed into a vein. The port allows medicines or nutrition to be sent right into your bloodstream. Blood can also be taken or given through the port. During the procedure, a long, thin tube called a catheter is threaded into one of your large veins. The tube is then attached to the port. This usually sits under the skin of your chest and causes a small bump. To use the port, a special needle is passed through your skin and into the port. The needle can stay in your skin for up to 7 days, if needed. A port can stay in place for weeks or months or longer.    Why is a vascular access port needed?  A vascular access port may allow healthcare providers to give you:    Chemotherapy or other cancer-fighting drugs    IV treatments, such as antibiotics or nutrition    Hemodialysis (for kidney failure)  The port may also be used to draw blood.  Before the procedure  Follow any instructions you are given on how to prepare.  Tell your provider about any medicines you are taking. This includes:    All prescription medicines    Over-the-counter medicines such as aspirin or ibuprofen    Herbs, vitamins, and other supplements  Also be sure your provider knows:    If you are pregnant or think you may be pregnant    If you are allergic to any medicines or substances, especially local anesthetics or iodine    Your full medical history, including why you will need the port    If you plan on doing any contact sports  During the procedure    Before the procedure, an IV may be put into a vein in your arm or hand. This gives you fluids and medicines. You may be given medicine through the IV to help you relax during  the procedure. This is called sedation. But some surgeons place ports using general anesthesia.    The chest is used most often for the port. In some cases, your belly (abdomen) or arm will be used instead.    The skin over the insertion area is numbed with local anesthetic.    Ultrasound or X-rays are used to help the healthcare provider guide the catheter into the proper location during the procedure.    A cut (incision) is made in the skin where the port will be placed. A small pocket for the port is formed under the skin.    A second small incision is made in the skin near the first incision. A tunnel under the skin is created. The catheter is put through the tunnel and into the blood vessel.    The skin is closed over the port. It is held shut with stitches (sutures) or surgical glue or tape. The second small incision is also closed.    A chest X-ray may be done to make sure the port is placed properly.  After the procedure  You may be taken to a recovery room where you ll recover from the sedation. Nurses will check on you as you rest. If you have pain, nurses can give you medicine. If you are not staying in the hospital overnight, you will be sent home a few hours after the procedure is done. A healthcare provider will tell you when you can go home. An adult family member or friend will need to drive you home.  Recovering at home    Take pain medicine as directed by your healthcare provider.    Take it easy for 24 hours after the procedure. Avoid physical activity and heavy lifting until your healthcare provider says it s OK.    Keep the port clean and dry. Ask when you can shower again. You will need to keep the port dry by covering it when you shower.    Care for the insertion site as you are directed.    Don t swim, bathe, or do other activities that cause water to cover the insertion site.    To keep the port from getting blocked with blood clots, flush it as often as directed. You should be shown the  proper way to flush the port before you go home. It is important to follow these directions.     Risks and possible complications of implantation    Bleeding    Infection of the insertion site    Damage to a blood vessel    Nerve injury or irritation    Collapsed lung (for chest port placements)    Skin breakdown over the port  Risks and possible complications of having a port    Blocked  port or catheter    Leakage or breakage of the port or catheter    The port moves out of position    Blood clot    Skin or bloodstream infection    Skin breakdown over the port      When to seek medical care  Call your healthcare provider right away if you have any of the following:    A fever of 100.4 F (38.0 C) or higher    You can't access or use the port properly    You can't flush the port or get a blood return    The skin near the port is red, warm, swollen, or broken    You have shoulder pain on the side where the port is located    You feel a heart flutter or racing heart     Swollen arm, if the port is placed in your arm   Date Last Reviewed: 7/1/2016 2000-2017 [employer]. [address] [place] [address], [place] [id number]. All rights reserved. This information is not intended as a substitute for professional medical care. Always follow your healthcare professional's instructions.

## 2017-09-08 NOTE — PROGRESS NOTES
Fluoro time for this case was  7 :seconds, less than 5 min  Was patient held? NO  If yes, by whom? NA and Technologist NAME SAI

## 2017-09-08 NOTE — ANESTHESIA CARE TRANSFER NOTE
Patient: Sola Gan    Procedure(s):  PORT-A-CATH PLACEMENT Left Subclavian - Wound Class: I-Clean    Diagnosis: ADENOCARCINOMA OF SIGMOID COLON/NEED FOR CHEMOTHERAPY  Diagnosis Additional Information: No value filed.    Anesthesia Type:   MAC     Note:  Airway :Nasal Cannula  Patient transferred to:Phase II        Vitals: (Last set prior to Anesthesia Care Transfer)    CRNA VITALS  9/8/2017 0835 - 9/8/2017 0910      9/8/2017             Resp Rate (set): 8                Electronically Signed By: ARABELLA Schreiber CRNA  September 8, 2017  9:10 AM

## 2017-09-08 NOTE — ANESTHESIA PREPROCEDURE EVALUATION
Anesthesia Evaluation     . Pt has had prior anesthetic.     No history of anesthetic complications          ROS/MED HX    ENT/Pulmonary:     (+)sleep apnea, MORA risk factors (BMI: 41.28) hypertension, obese, allergic rhinitis, other ENT- s/p UPPP, Current Abscessed tooth on antibiotics , tobacco use, Current use <0.75 PPD packs/day  asthma COPD, doesn't use CPAP non-compliant cmH2O , . .    Neurologic:  - neg neurologic ROS     Cardiovascular:     (+) Dyslipidemia, hypertension-range: not on beta blocker, ---. : . . . :. . Previous cardiac testing date:results:date: results:ECG reviewed date:5/22/2017 results:NSR@81, OWN date: results:          METS/Exercise Tolerance:     Hematologic:  - neg hematologic  ROS       Musculoskeletal:  - neg musculoskeletal ROS       GI/Hepatic:     (+) Other GI/Hepatic Metastatic ADENOCARCINOMA OF SIGMOID COLON s/p robotic sigmoid colectomy 7/27/2017      Renal/Genitourinary:         Endo:     (+) type II DM Last HgA1c: 6.7 date: 6/26/2017 Obesity, .      Psychiatric:  - neg psychiatric ROS       Infectious Disease:  - neg infectious disease ROS       Malignancy:   (+) Malignancy History of GI  GI CA  Active status post Surgery and Chemo,         Other:    - neg other ROS                 Physical Exam  Normal systems: cardiovascular    Airway   Mallampati: II  TM distance: <3 FB  Neck ROM: full    Dental   (+) missing and chipped    Cardiovascular   Rhythm and rate: regular and normal      Pulmonary    breath sounds clear to auscultation(+) decreased breath sounds                       Anesthesia Plan      History & Physical Review  History and physical reviewed and following examination; no interval change.    ASA Status:  4 .    NPO Status:  > 8 hours    Plan for MAC with Intravenous and Propofol induction. Maintenance will be TIVA.  Reason for MAC:  Deep or markedly invasive procedure (G8), Chronic cardiopulmonary disease (G9), Difficulty with conscious sedation (QS) and  Procedure to face, neck, head or breast  PONV prophylaxis:  Ondansetron (or other 5HT-3), Dexamethasone or Solumedrol and Scopolamine patch       Postoperative Care  Postoperative pain management:  IV analgesics and Oral pain medications.      Consents  Anesthetic plan, risks, benefits and alternatives discussed with:  Patient..                          .

## 2017-09-08 NOTE — OR NURSING
Patient and responsible adult given discharge instructions with no questions regarding instructions. Ingrid score 20. Pain level 1/10.  Discharged from unit via walking. Patient discharged to home with daughter.

## 2017-09-08 NOTE — OP NOTE
PREOPERATIVE DIAGNOSES:   1.  Adenocarcinoma of the sigmoid colon   2.  Need for venous access with port for adjuvant chemotherapy.      POSTOPERATIVE DIAGNOSIS:     1.  Adenocarcinoma of the sigmoid colon   2.  Need for venous access with port for adjuvant chemotherapy.      PROCEDURE:  Insertion Port-A-Cath via left internal jugular vein.      HISTORY:  This Monday, September 11, 2017 ; adjuvant chemotherapy is planned.        DESCRIPTION OF PROCEDURE:  With the patient in the supine position on the operating table, IV sedation was administered by the nurse anesthetist.  Time out was done with correct identity and planned procedure were confirmed during the requisite timeout pause and the both sides of the chest wall, subclavicular regions, and neck were prepped and draped sterilely.  Local anesthesia was obtained with infiltration of 0.5% Marcaine with epinephrine.  The patient was placed in steep Trendelenburg position and the left internal jugular vein was localized percutaneously.    The J wire was passed in standard Seldinger fashion and its position confirmed with fluoroscopy.  A transverse incision was made at the site of the J wire and carried through full thickness skin and subcutaneous tissue with bleeding controlled by electrocautery.  Full thickness skin and subcutaneous tissue was elevated off the pectoral fascia inferiorly with cautery dissection to create a subcutaneous pocket for the port reservoir.  The wound was irrigated with antibiotic/saline solution and additional hemostasis obtained with electrocautery.  The dilator and introducer sheath was passed over the J-wire and the 8-[place] Port-A-Cath catheter was introduced to the superior vena cava/right atrial junction with its position confirmed by fluoroscopy.  The catheter was aspirated and irrigated with dilute heparinized solution and affixed to the port reservoir with the 's locking device.  The port was seated in the  subcutaneous pocket with interrupted 3-0 Vicryl sutures.  The wounds were irrigated with saline solution and additional hemostasis obtained with electrocautery.      The subcutaneous tissues were closed in layers with running 3-0 Vicryl, and the skin was reapproximated with subcuticularl 4-0 monocryl. Dermabond was then applied.  The port was accessed percutaneously and aspirated and irrigated confirming free flow without impediment.   A sterile dressing was applied and the patient was returned to day surgery in good condition, without suggestion of complication; chest xray confirmed good position and the absence of pneumothorax.  The estimated blood loss was minimal and the sponge, needle and instrument counts were reported correct on two separate occasions prior to completion.      Awais Ding MD  General Surgery

## 2017-09-08 NOTE — IP AVS SNAPSHOT
[facility]    [address] [address]    Phone:  [phone]                                       After Visit Summary   9/8/2017    Sola Gan    MRN: [id number]           After Visit Summary Signature Page     I have received my discharge instructions, and my questions have been answered. I have discussed any challenges I see with this plan with the nurse or doctor.    ..........................................................................................................................................  Patient/Patient Representative Signature      ..........................................................................................................................................  Patient Representative Print Name and Relationship to Patient    ..................................................               ................................................  Date                                            Time    ..........................................................................................................................................  Reviewed by Signature/Title    ...................................................              ..............................................  Date                                                            Time

## 2017-09-08 NOTE — ANESTHESIA POSTPROCEDURE EVALUATION
Patient: Sola Gan    Procedure(s):  PORT-A-CATH PLACEMENT LEFT INTERNAL JUGULAR - Wound Class: I-Clean    Diagnosis:ADENOCARCINOMA OF SIGMOID COLON/NEED FOR CHEMOTHERAPY  Diagnosis Additional Information: No value filed.    Anesthesia Type:  MAC    Note:  Anesthesia Post Evaluation    Patient location during evaluation: Phase 2 and Bedside  Patient participation: Able to fully participate in evaluation  Level of consciousness: awake and alert  Pain management: adequate  Airway patency: patent  Cardiovascular status: acceptable  Respiratory status: acceptable  Hydration status: stable  PONV: none     Anesthetic complications: None          Last vitals:  Vitals:    09/08/17 1015 09/08/17 1020 09/08/17 1025   BP: 133/91 138/65 123/83   Resp:      Temp:   97.3  F (36.3  C)   SpO2: 97% 97% 95%         Electronically Signed By: Dmitry Day MD  September 8, 2017  11:58 AM

## 2017-09-08 NOTE — BRIEF OP NOTE
[facility] Brief Operative Note    Pre-operative diagnosis: ADENOCARCINOMA OF SIGMOID COLON/NEED FOR CHEMOTHERAPY   Post-operative diagnosis Adenocarcinoma of the sigmoid colon   Procedure: Procedure(s):  PORT-A-CATH PLACEMENT Left Subclavian - Wound Class: I-Clean   Surgeon: Awais Ding MD   Assistants(s): None   Estimated blood loss: Minimal    Specimens: None   Findings: Proper placement of left internal jugular port-a-cath with Xray confirmation.       Awais Ding MD

## 2017-09-12 ENCOUNTER — TELEPHONE (OUTPATIENT)
Dept: EDUCATION SERVICES | Facility: HOSPITAL | Age: 67
End: 2017-09-12

## 2017-09-12 NOTE — TELEPHONE ENCOUNTER
Patient started chemo therapy and her blood sugars are increasing. She wants to speak to [place] regarding this.

## 2017-09-13 NOTE — TELEPHONE ENCOUNTER
Called Sola. She is concerned that now that she has started chemotherapy, her BG readings are trending up. She is taking Metformin 500 mg tid. She tells me that her morning readings are at 140s and post-supper last night was 177. She reports her usual morning readings have been 110-125. I reassured her that with all that she is dealing with and steroids in chemo she will have some higher BG readings. Her current BGs are ok for now. I asked her to call me if she finds that her readings climb into 200s or any time that she might be concerned. Will follow her prn and in December 2017.

## 2017-09-21 DIAGNOSIS — E11.9 TYPE 2 DIABETES MELLITUS WITHOUT COMPLICATION, WITHOUT LONG-TERM CURRENT USE OF INSULIN (H): ICD-10-CM

## 2017-09-21 DIAGNOSIS — R92.8 ABNORMAL MAMMOGRAM: Primary | ICD-10-CM

## 2017-09-21 NOTE — TELEPHONE ENCOUNTER
Metformin          Last Written Prescription Date: 5/4/2017  Last Fill Quantity: 90, # refills: 3  [place] Office [place] with [facility] or [facility] prescribing provider:  7/17/2017        BP Readings from Last 3 Encounters:   09/08/17 123/83   09/05/17 124/78   07/17/17 126/66     Lab Results   Component Value Date    MICROL 18 03/28/2017     Lab Results   Component Value Date    UMALCR 17.09 03/28/2017     Creatinine   Date Value Ref Range Status   06/27/2017 0.69 0.52 - 1.04 mg/dL Final   ]  GFR Estimate   Date Value Ref Range Status   06/27/2017 86 >60 mL/min/1.7m2 Final     Comment:     Non  GFR Calc   04/13/2017 81 >60 mL/min/1.7m2 Final     Comment:     Non  GFR Calc   03/28/2017 67 >60 mL/min/1.7m2 Final     Comment:     Non  GFR Calc     GFR Estimate If Black   Date Value Ref Range Status   06/27/2017 >90   GFR Calc   >60 mL/min/1.7m2 Final   04/13/2017 >90   GFR Calc   >60 mL/min/1.7m2 Final   03/28/2017 82 >60 mL/min/1.7m2 Final     Comment:      GFR Calc     Lab Results   Component Value Date    CHOL 173 03/28/2017     Lab Results   Component Value Date    HDL 40 03/28/2017     Lab Results   Component Value Date     03/28/2017     Lab Results   Component Value Date    TRIG 147 03/28/2017     Lab Results   Component Value Date    CHOLHDLRATIO 4.0 04/01/2014     Lab Results   Component Value Date    AST 20 06/27/2017     Lab Results   Component Value Date    ALT 35 06/27/2017     Lab Results   Component Value Date    A1C 6.7 06/26/2017    A1C 11.4 03/21/2017     Potassium   Date Value Ref Range Status   06/27/2017 3.7 3.4 - 5.3 mmol/L Final

## 2017-10-04 ENCOUNTER — TELEPHONE (OUTPATIENT)
Dept: EDUCATION SERVICES | Facility: HOSPITAL | Age: 67
End: 2017-10-04

## 2017-10-04 NOTE — TELEPHONE ENCOUNTER
Patient blood sugars went over 200 [place] told patient to call her if it did. Patient is also receiving chemo therpay.

## 2017-10-10 NOTE — TELEPHONE ENCOUNTER
Returned Sola's call. She states that with her chemo treatments, she is finding FBG readings are 125-140 and readings later in the day are elevated. However, with discussion, it sounds like she only has occasional spikes later in the day in the 200s. Sola tells me that readings trend back down as she again approaches next chemo treatment. I reassured her that her readings are ok for now. If they are all in the 200s consistently, then we may need to look at adding something. I asked her to call me if she is concerned and wants to review her readings with me. Will follow prn.

## 2017-10-14 DIAGNOSIS — F32.89 OTHER DEPRESSION: ICD-10-CM

## 2017-10-16 NOTE — TELEPHONE ENCOUNTER
Prozac     Last Written Prescription Date: 3/28/17  Last Fill Quantity: 90, # refills: 1  [place] [facility] [place]:  7/17/17        Last PHQ-9 score on record=   PHQ-9 SCORE 7/17/2017   Total Score 1

## 2017-10-17 ENCOUNTER — TELEPHONE (OUTPATIENT)
Dept: EDUCATION SERVICES | Facility: HOSPITAL | Age: 67
End: 2017-10-17

## 2017-10-20 NOTE — TELEPHONE ENCOUNTER
Called Sola on 10/18/17. Reviewed meter readings and concern that she saw the test solution symbol. BG readings in mid-100s. Asked her to call again if she sees the test solution symbol. It may have been that the blood did not wick all the way up the test strip?.

## 2017-10-25 ENCOUNTER — HOSPITAL ENCOUNTER (OUTPATIENT)
Facility: HOSPITAL | Age: 67
Setting detail: OBSERVATION
Discharge: HOME | End: 2017-10-26
Attending: PHYSICIAN ASSISTANT | Admitting: HOSPITALIST
Payer: COMMERCIAL

## 2017-10-25 ENCOUNTER — APPOINTMENT (OUTPATIENT)
Dept: GENERAL RADIOLOGY | Facility: HOSPITAL | Age: 67
End: 2017-10-25
Attending: PHYSICIAN ASSISTANT
Payer: COMMERCIAL

## 2017-10-25 DIAGNOSIS — R10.13 ABDOMINAL PAIN, EPIGASTRIC: Primary | ICD-10-CM

## 2017-10-25 DIAGNOSIS — D70.1 CHEMOTHERAPY-INDUCED NEUTROPENIA (H): ICD-10-CM

## 2017-10-25 DIAGNOSIS — R07.9 CHEST PAIN, UNSPECIFIED TYPE: ICD-10-CM

## 2017-10-25 DIAGNOSIS — T45.1X5A CHEMOTHERAPY-INDUCED NEUTROPENIA (H): ICD-10-CM

## 2017-10-25 DIAGNOSIS — E87.6 HYPOKALEMIA: ICD-10-CM

## 2017-10-25 LAB
ALBUMIN SERPL-MCNC: 3.8 G/DL (ref 3.4–5)
ALP SERPL-CCNC: 87 U/L (ref 40–150)
ALT SERPL W P-5'-P-CCNC: 46 U/L (ref 0–50)
ANION GAP SERPL CALCULATED.3IONS-SCNC: 9 MMOL/L (ref 3–14)
AST SERPL W P-5'-P-CCNC: 27 U/L (ref 0–45)
BASOPHILS # BLD AUTO: 0 10E9/L (ref 0–0.2)
BASOPHILS NFR BLD AUTO: 1.6 %
BILIRUB SERPL-MCNC: 0.4 MG/DL (ref 0.2–1.3)
BUN SERPL-MCNC: 16 MG/DL (ref 7–30)
CALCIUM SERPL-MCNC: 9.1 MG/DL (ref 8.5–10.1)
CHLORIDE SERPL-SCNC: 102 MMOL/L (ref 94–109)
CO2 SERPL-SCNC: 26 MMOL/L (ref 20–32)
CREAT SERPL-MCNC: 0.7 MG/DL (ref 0.52–1.04)
D DIMER PPP DDU-MCNC: 247 NG/ML D-DU (ref 0–300)
DIFFERENTIAL METHOD BLD: ABNORMAL
EOSINOPHIL # BLD AUTO: 0.2 10E9/L (ref 0–0.7)
EOSINOPHIL NFR BLD AUTO: 6.5 %
ERYTHROCYTE [DISTWIDTH] IN BLOOD BY AUTOMATED COUNT: 16.6 % (ref 10–15)
GFR SERPL CREATININE-BSD FRML MDRD: 84 ML/MIN/1.7M2
GLUCOSE BLDC GLUCOMTR-MCNC: 127 MG/DL (ref 70–99)
GLUCOSE BLDC GLUCOMTR-MCNC: 151 MG/DL (ref 70–99)
GLUCOSE SERPL-MCNC: 122 MG/DL (ref 70–99)
HCT VFR BLD AUTO: 43 % (ref 35–47)
HGB BLD-MCNC: 14.4 G/DL (ref 11.7–15.7)
IMM GRANULOCYTES # BLD: 0 10E9/L (ref 0–0.4)
IMM GRANULOCYTES NFR BLD: 0.4 %
LYMPHOCYTES # BLD AUTO: 1 10E9/L (ref 0.8–5.3)
LYMPHOCYTES NFR BLD AUTO: 39.3 %
MCH RBC QN AUTO: 29.7 PG (ref 26.5–33)
MCHC RBC AUTO-ENTMCNC: 33.5 G/DL (ref 31.5–36.5)
MCV RBC AUTO: 89 FL (ref 78–100)
MONOCYTES # BLD AUTO: 0.2 10E9/L (ref 0–1.3)
MONOCYTES NFR BLD AUTO: 8.9 %
NEUTROPHILS # BLD AUTO: 1.1 10E9/L (ref 1.6–8.3)
NEUTROPHILS NFR BLD AUTO: 43.3 %
NRBC # BLD AUTO: 0 10*3/UL
NRBC BLD AUTO-RTO: 0 /100
PLATELET # BLD AUTO: 158 10E9/L (ref 150–450)
POTASSIUM SERPL-SCNC: 3.3 MMOL/L (ref 3.4–5.3)
PROT SERPL-MCNC: 7.8 G/DL (ref 6.8–8.8)
RBC # BLD AUTO: 4.85 10E12/L (ref 3.8–5.2)
SODIUM SERPL-SCNC: 137 MMOL/L (ref 133–144)
TROPONIN I SERPL-MCNC: <0.015 UG/L (ref 0–0.04)
TROPONIN I SERPL-MCNC: <0.015 UG/L (ref 0–0.04)
WBC # BLD AUTO: 2.5 10E9/L (ref 4–11)

## 2017-10-25 PROCEDURE — G0378 HOSPITAL OBSERVATION PER HR: HCPCS

## 2017-10-25 PROCEDURE — 99285 EMERGENCY DEPT VISIT HI MDM: CPT | Mod: 25

## 2017-10-25 PROCEDURE — 36415 COLL VENOUS BLD VENIPUNCTURE: CPT | Performed by: PHYSICIAN ASSISTANT

## 2017-10-25 PROCEDURE — 25000128 H RX IP 250 OP 636: Performed by: HOSPITALIST

## 2017-10-25 PROCEDURE — 84484 ASSAY OF TROPONIN QUANT: CPT | Performed by: PHYSICIAN ASSISTANT

## 2017-10-25 PROCEDURE — 40000275 ZZH STATISTIC RCP TIME EA 10 MIN

## 2017-10-25 PROCEDURE — 93005 ELECTROCARDIOGRAM TRACING: CPT

## 2017-10-25 PROCEDURE — 25000125 ZZHC RX 250: Performed by: HOSPITALIST

## 2017-10-25 PROCEDURE — 96374 THER/PROPH/DIAG INJ IV PUSH: CPT

## 2017-10-25 PROCEDURE — 80053 COMPREHEN METABOLIC PANEL: CPT | Performed by: PHYSICIAN ASSISTANT

## 2017-10-25 PROCEDURE — 71020 XR CHEST 2 VW: CPT | Mod: TC

## 2017-10-25 PROCEDURE — 93010 ELECTROCARDIOGRAM REPORT: CPT | Performed by: INTERNAL MEDICINE

## 2017-10-25 PROCEDURE — 99220 ZZC INITIAL OBSERVATION CARE,LEVL III: CPT | Performed by: HOSPITALIST

## 2017-10-25 PROCEDURE — 25000128 H RX IP 250 OP 636: Performed by: PHYSICIAN ASSISTANT

## 2017-10-25 PROCEDURE — 25000132 ZZH RX MED GY IP 250 OP 250 PS 637: Performed by: PHYSICIAN ASSISTANT

## 2017-10-25 PROCEDURE — 94640 AIRWAY INHALATION TREATMENT: CPT

## 2017-10-25 PROCEDURE — 25000132 ZZH RX MED GY IP 250 OP 250 PS 637: Performed by: HOSPITALIST

## 2017-10-25 PROCEDURE — 99284 EMERGENCY DEPT VISIT MOD MDM: CPT | Performed by: PHYSICIAN ASSISTANT

## 2017-10-25 PROCEDURE — 00000146 ZZHCL STATISTIC GLUCOSE BY METER IP

## 2017-10-25 PROCEDURE — 85025 COMPLETE CBC W/AUTO DIFF WBC: CPT | Performed by: PHYSICIAN ASSISTANT

## 2017-10-25 PROCEDURE — 85379 FIBRIN DEGRADATION QUANT: CPT | Performed by: PHYSICIAN ASSISTANT

## 2017-10-25 RX ORDER — LISINOPRIL 10 MG/1
10 TABLET ORAL DAILY
Status: DISCONTINUED | OUTPATIENT
Start: 2017-10-25 | End: 2017-10-26 | Stop reason: HOSPADM

## 2017-10-25 RX ORDER — LISINOPRIL 10 MG/1
10 TABLET ORAL DAILY
Status: DISCONTINUED | OUTPATIENT
Start: 2017-10-26 | End: 2017-10-25

## 2017-10-25 RX ORDER — ATORVASTATIN CALCIUM 40 MG/1
40 TABLET, FILM COATED ORAL AT BEDTIME
Status: DISCONTINUED | OUTPATIENT
Start: 2017-10-25 | End: 2017-10-26 | Stop reason: HOSPADM

## 2017-10-25 RX ORDER — ALPRAZOLAM 0.5 MG/1
0.5 TABLET, EXTENDED RELEASE ORAL AT BEDTIME
Status: DISCONTINUED | OUTPATIENT
Start: 2017-10-25 | End: 2017-10-26

## 2017-10-25 RX ORDER — HYDRALAZINE HYDROCHLORIDE 25 MG/1
25 TABLET, FILM COATED ORAL ONCE
Status: COMPLETED | OUTPATIENT
Start: 2017-10-25 | End: 2017-10-25

## 2017-10-25 RX ORDER — LORAZEPAM 2 MG/ML
0.5 INJECTION INTRAMUSCULAR ONCE
Status: COMPLETED | OUTPATIENT
Start: 2017-10-25 | End: 2017-10-25

## 2017-10-25 RX ORDER — ONDANSETRON 2 MG/ML
4 INJECTION INTRAMUSCULAR; INTRAVENOUS EVERY 6 HOURS PRN
Status: DISCONTINUED | OUTPATIENT
Start: 2017-10-25 | End: 2017-10-26 | Stop reason: HOSPADM

## 2017-10-25 RX ORDER — PANTOPRAZOLE SODIUM 40 MG/1
40 TABLET, DELAYED RELEASE ORAL
Status: DISCONTINUED | OUTPATIENT
Start: 2017-10-25 | End: 2017-10-26 | Stop reason: HOSPADM

## 2017-10-25 RX ORDER — LISINOPRIL 10 MG/1
10 TABLET ORAL
COMMUNITY
End: 2017-10-25

## 2017-10-25 RX ORDER — NITROGLYCERIN 0.4 MG/1
0.4 TABLET SUBLINGUAL EVERY 5 MIN PRN
Status: DISCONTINUED | OUTPATIENT
Start: 2017-10-25 | End: 2017-10-25

## 2017-10-25 RX ORDER — NICOTINE 21 MG/24HR
1 PATCH, TRANSDERMAL 24 HOURS TRANSDERMAL DAILY
Status: DISCONTINUED | OUTPATIENT
Start: 2017-10-25 | End: 2017-10-26 | Stop reason: HOSPADM

## 2017-10-25 RX ORDER — IPRATROPIUM BROMIDE AND ALBUTEROL SULFATE 2.5; .5 MG/3ML; MG/3ML
3 SOLUTION RESPIRATORY (INHALATION)
Status: DISCONTINUED | OUTPATIENT
Start: 2017-10-25 | End: 2017-10-26 | Stop reason: HOSPADM

## 2017-10-25 RX ORDER — ASPIRIN 81 MG/1
81 TABLET ORAL DAILY
Status: DISCONTINUED | OUTPATIENT
Start: 2017-10-26 | End: 2017-10-26 | Stop reason: HOSPADM

## 2017-10-25 RX ORDER — HYDROMORPHONE HYDROCHLORIDE 1 MG/ML
.3-.5 INJECTION, SOLUTION INTRAMUSCULAR; INTRAVENOUS; SUBCUTANEOUS
Status: DISCONTINUED | OUTPATIENT
Start: 2017-10-25 | End: 2017-10-26 | Stop reason: HOSPADM

## 2017-10-25 RX ORDER — IBUPROFEN 600 MG/1
600 TABLET, FILM COATED ORAL
COMMUNITY
End: 2017-10-25

## 2017-10-25 RX ORDER — CETIRIZINE HYDROCHLORIDE 10 MG/1
10 TABLET ORAL
COMMUNITY
End: 2017-10-25

## 2017-10-25 RX ORDER — SUCRALFATE ORAL 1 G/10ML
1 SUSPENSION ORAL
Status: DISCONTINUED | OUTPATIENT
Start: 2017-10-25 | End: 2017-10-26 | Stop reason: HOSPADM

## 2017-10-25 RX ORDER — CETIRIZINE HYDROCHLORIDE 10 MG/1
10 TABLET ORAL DAILY
Status: DISCONTINUED | OUTPATIENT
Start: 2017-10-26 | End: 2017-10-26 | Stop reason: HOSPADM

## 2017-10-25 RX ORDER — POTASSIUM CHLORIDE 1500 MG/1
20 TABLET, EXTENDED RELEASE ORAL ONCE
Status: COMPLETED | OUTPATIENT
Start: 2017-10-25 | End: 2017-10-25

## 2017-10-25 RX ORDER — ALPRAZOLAM 0.5 MG
TABLET ORAL
Status: COMPLETED
Start: 2017-10-25 | End: 2017-10-26

## 2017-10-25 RX ORDER — ASPIRIN 81 MG/1
162 TABLET, CHEWABLE ORAL ONCE
Status: COMPLETED | OUTPATIENT
Start: 2017-10-25 | End: 2017-10-25

## 2017-10-25 RX ORDER — DIPHENHYDRAMINE HCL 25 MG
25 CAPSULE ORAL
Status: DISCONTINUED | OUTPATIENT
Start: 2017-10-25 | End: 2017-10-26 | Stop reason: HOSPADM

## 2017-10-25 RX ORDER — ATORVASTATIN CALCIUM 40 MG/1
40 TABLET, FILM COATED ORAL
COMMUNITY
End: 2017-10-25

## 2017-10-25 RX ORDER — ALBUTEROL SULFATE 90 UG/1
2 AEROSOL, METERED RESPIRATORY (INHALATION) EVERY 4 HOURS PRN
Status: DISCONTINUED | OUTPATIENT
Start: 2017-10-25 | End: 2017-10-26 | Stop reason: HOSPADM

## 2017-10-25 RX ORDER — NALOXONE HYDROCHLORIDE 0.4 MG/ML
.1-.4 INJECTION, SOLUTION INTRAMUSCULAR; INTRAVENOUS; SUBCUTANEOUS
Status: DISCONTINUED | OUTPATIENT
Start: 2017-10-25 | End: 2017-10-26 | Stop reason: HOSPADM

## 2017-10-25 RX ORDER — ALBUTEROL SULFATE 90 UG/1
AEROSOL, METERED RESPIRATORY (INHALATION)
COMMUNITY
Start: 2014-05-22 | End: 2017-10-25

## 2017-10-25 RX ADMIN — PANTOPRAZOLE SODIUM 40 MG: 40 TABLET, DELAYED RELEASE ORAL at 18:24

## 2017-10-25 RX ADMIN — ASPIRIN 81 MG 162 MG: 81 TABLET ORAL at 11:18

## 2017-10-25 RX ADMIN — LORAZEPAM 0.5 MG: 2 INJECTION, SOLUTION INTRAMUSCULAR; INTRAVENOUS at 12:07

## 2017-10-25 RX ADMIN — NICOTINE 1 PATCH: 21 PATCH, EXTENDED RELEASE TRANSDERMAL at 21:48

## 2017-10-25 RX ADMIN — ATORVASTATIN CALCIUM 40 MG: 40 TABLET, FILM COATED ORAL at 21:30

## 2017-10-25 RX ADMIN — SUCRALFATE 1 G: 1 SUSPENSION ORAL at 21:30

## 2017-10-25 RX ADMIN — SUCRALFATE 1 G: 1 SUSPENSION ORAL at 18:24

## 2017-10-25 RX ADMIN — HYDRALAZINE HYDROCHLORIDE 25 MG: 25 TABLET ORAL at 21:30

## 2017-10-25 RX ADMIN — IPRATROPIUM BROMIDE AND ALBUTEROL SULFATE 3 ML: .5; 3 SOLUTION RESPIRATORY (INHALATION) at 20:24

## 2017-10-25 RX ADMIN — HYDROMORPHONE HYDROCHLORIDE 0.5 MG: 1 INJECTION, SOLUTION INTRAMUSCULAR; INTRAVENOUS; SUBCUTANEOUS at 21:49

## 2017-10-25 RX ADMIN — DIPHENHYDRAMINE HYDROCHLORIDE 25 MG: 25 CAPSULE ORAL at 21:42

## 2017-10-25 RX ADMIN — NITROGLYCERIN 0.4 MG: 0.4 TABLET SUBLINGUAL at 11:19

## 2017-10-25 RX ADMIN — LISINOPRIL 10 MG: 10 TABLET ORAL at 21:37

## 2017-10-25 RX ADMIN — POTASSIUM CHLORIDE 20 MEQ: 20 TABLET, EXTENDED RELEASE ORAL at 13:04

## 2017-10-25 RX ADMIN — METFORMIN HYDROCHLORIDE 1000 MG: 1000 TABLET, FILM COATED ORAL at 18:24

## 2017-10-25 ASSESSMENT — ENCOUNTER SYMPTOMS
CHILLS: 0
ACTIVITY CHANGE: 0
FEVER: 0
SHORTNESS OF BREATH: 0
NAUSEA: 0
BACK PAIN: 0
FATIGUE: 0
VOMITING: 0
COUGH: 0
CHEST TIGHTNESS: 1
LIGHT-HEADEDNESS: 0
APPETITE CHANGE: 0
DIZZINESS: 0
ABDOMINAL PAIN: 0
HEADACHES: 0

## 2017-10-25 ASSESSMENT — PAIN DESCRIPTION - DESCRIPTORS: DESCRIPTORS: RADIATING;PRESSURE

## 2017-10-25 NOTE — ED NOTES
Nurse to nurse report given to Tutu RN.  Pt transferring to the 3rd floor.  Requested to go to car to retrieve her personal belongings - ok with Radha HER provider if accompanied by staff.  Left chest port is WDL.  VS as charted.  Pt /112 and Radha HER aware.  Pt states if the cuff gets pulled down to her forearm her BP is normal.  Rates pain 1/10.

## 2017-10-25 NOTE — IP AVS SNAPSHOT
[facility]    [address] [place] [address]    Phone:  [phone]    Fax:  [phone]                                       After Visit Summary   10/25/2017    Sola Gan    MRN: [id number]           After Visit Summary Signature Page     I have received my discharge instructions, and my questions have been answered. I have discussed any challenges I see with this plan with the nurse or doctor.    ..........................................................................................................................................  Patient/Patient Representative Signature      ..........................................................................................................................................  Patient Representative Print Name and Relationship to Patient    ..................................................               ................................................  Date                                            Time    ..........................................................................................................................................  Reviewed by Signature/Title    ...................................................              ..............................................  Date                                                            Time

## 2017-10-25 NOTE — IP AVS SNAPSHOT
MRN:[id number]                      After Visit Summary   10/25/2017    Sola Gan    MRN: [id number]           Thank you!     Thank you for choosing [facility] for your care. Our goal is always to provide you with excellent care. Hearing back from our patients is one way we can continue to improve our services. Please take a few minutes to complete the written survey that you may receive in the mail after you visit with us. Thank you!        Patient Information     Date Of Birth          1950        Designated Caregiver       Most Recent Value    Caregiver    Will someone help with your care after discharge? no      About your hospital stay     You were admitted on:  October 25, 2017 You last received care in the:  [facility]    You were discharged on:  October 26, 2017        Reason for your hospital stay       Right chest/upper abdominal pain                  Who to Call     For medical emergencies, please call 911.  For non-urgent questions about your medical care, please call your primary care provider or clinic, [phone]          Attending Provider     Provider Specialty    Radha Matute PA-C Emergency Medicine    Gloria Thomas MD --    Vandana Barclay, NP Nurse Practitioner       Primary Care Provider Office Phone # Fax #    Favian Recinos [phone]       When to contact your care team       Call your primary doctor if you have any of the following: shortness of breath, sudden severe chest pain, fever, any new or worsening symptoms.                  After Care Instructions     Activity       Your activity upon discharge: activity as tolerated            Diet       Follow this diet upon discharge: Orders Placed This Encounter      Moderate Consistent CHO Diet                  Follow-up Appointments     Follow-up and recommended labs and tests        Follow up with primary care provider, Favian Recinos, within 7 days for hospital follow- up.   No follow up labs or test are needed.                  Your next 10 appointments already scheduled     Nov 02, 2017 11:00 AM CDT   (Arrive by 10:45 AM)   SHORT with Favian Recinos MD   [facility] ([facility] )    [place] [address]   [phone]              Pending Results     Date and Time Order Name Status Description    10/26/2017 0849 Stress EKG Interpretation In process             Statement of Approval     Ordered          10/26/17 1358  I have reviewed and agree with all the recommendations and orders detailed in this document.  EFFECTIVE NOW     Approved and electronically signed by:  Vandana Barclay, HAYDER             Admission Information     Date & Time Provider Department Dept. Phone    10/25/2017 Vandana Barclay, NP [facility] [phone]      Your Vitals Were     Blood Pressure Pulse Temperature Respirations Pulse Oximetry       127/78 85 99  F (37.2  C) (Tympanic) 18 99%       MyChart Information     [employer] gives you secure access to your electronic health record. If you see a primary care provider, you can also send messages to your care team and make appointments. If you have questions, please call your primary care clinic.  If you do not have a primary care provider, please call [phone] and they will assist you.        Care EveryWhere ID     This is your Care EveryWhere ID. This could be used by other organizations to access your [facility] medical records  [id number]        Equal Access to Services     LUNA WATKINS : Hadii nicol gaffney hadasho Soseraali, waaxda luqadaha, qaybta kaalmada fdieda, madai gann. So [facility] [phone].    ATENCIÓN: Si habla español, tiene a sanchez disposición servicios gratuitos de asistencia lingüística. Ritesh al [phone].    We comply with applicable federal civil rights laws and [place] laws. We do not discriminate on the basis of race, color, national origin,  age, disability, sex, sexual orientation, or gender identity.               Review of your medicines      START taking        Dose / Directions    pantoprazole 40 MG EC tablet   Commonly known as:  PROTONIX   Used for:  Abdominal pain, epigastric        Dose:  40 mg   Take 1 tablet (40 mg) by mouth 2 times daily (before meals)   Quantity:  60 tablet   Refills:  1       sucralfate 1 GM/10ML suspension   Commonly known as:  CARAFATE   Used for:  Abdominal pain, epigastric        Dose:  1 g   Take 10 mLs (1 g) by mouth 4 times daily (before meals and nightly) for 14 days   Quantity:  560 mL   Refills:  0         CONTINUE these medicines which may have CHANGED, or have new prescriptions. If we are uncertain of the size of tablets/capsules you have at home, strength may be listed as something that might have changed.        Dose / Directions    atorvastatin 40 MG tablet   Commonly known as:  LIPITOR   This may have changed:  when to take this   Used for:  Controlled type 2 diabetes mellitus without complication, without long-term current use of insulin (H)        Dose:  40 mg   Take 1 tablet (40 mg) by mouth daily   Quantity:  90 tablet   Refills:  3       * METFORMIN HCL PO   This may have changed:  Another medication with the same name was removed. Continue taking this medication, and follow the directions you see here.        Dose:  500 mg   Take 500 mg by mouth daily (with breakfast)   Refills:  0       * METFORMIN HCL PO   This may have changed:  Another medication with the same name was removed. Continue taking this medication, and follow the directions you see here.        Dose:  1000 mg   Take 1,000 mg by mouth daily (with dinner)   Refills:  0       * Notice:  This list has 2 medication(s) that are the same as other medications prescribed for you. Read the directions carefully, and ask your doctor or other care provider to review them with you.      CONTINUE these medicines which have NOT CHANGED        Dose /  Directions    ADVIL PO        Dose:  600-800 mg   Take 600-800 mg by mouth every 8 hours as needed for moderate pain   Refills:  0       albuterol 108 (90 BASE) MCG/ACT Inhaler   Commonly known as:  PROAIR HFA        Dose:  2 puff   Inhale 2 puffs into the lungs every 4 hours as needed for shortness of breath / dyspnea   Quantity:  1 Inhaler   Refills:  0       ALOXI IV        Dose:  0.25 mg   Inject 0.25 mg into the vein every 14 days With chemo   Refills:  0       aspirin 81 MG EC tablet   Used for:  Controlled type 2 diabetes mellitus without complication, without long-term current use of insulin (H)        Dose:  81 mg   Take 1 tablet (81 mg) by mouth daily   Quantity:  90 tablet   Refills:  3       BENADRYL PO        Dose:  25 mg   Take 25 mg by mouth nightly as needed for sleep   Refills:  0       blood glucose monitoring test strip   Commonly known as:  ONE TOUCH VERIO IQ   Used for:  Type 2 diabetes mellitus with hyperglycemia, without long-term current use of insulin (H)        Use to test blood sugar 2 times daily.   Quantity:  100 each   Refills:  10       COMBIVENT RESPIMAT  MCG/ACT inhaler   Used for:  Other emphysema (H)   Generic drug:  Ipratropium-Albuterol        SEE NOTES   Quantity:  4 g   Refills:  1       dexamethasone 0.6 mg/kg/day        Dose:  12 mg   Inject 12 mg into the vein every 14 days With chemo   Refills:  0       FLUOROURACIL IV        Inject into the vein every 14 days Had pump removed 10/25/17   Refills:  0       FLUoxetine 20 MG capsule   Commonly known as:  PROzac   Used for:  Other depression        TAKE 1 CAPSULE(20 MG) BY MOUTH DAILY   Quantity:  90 capsule   Refills:  0       LEUCOVORIN CALCIUM IV        Inject into the vein every 14 days   Refills:  0       lisinopril 10 MG tablet   Commonly known as:  PRINIVIL/ZESTRIL   Used for:  Benign essential hypertension        Dose:  10 mg   Take 1 tablet (10 mg) by mouth daily   Quantity:  30 tablet   Refills:  8        MULTIVITAMIN GUMMIES ADULT PO        Dose:  2 chew tab   Take 2 chew tab by mouth daily   Refills:  0       ondansetron 4 MG tablet   Commonly known as:  ZOFRAN   Used for:  Cancer of sigmoid colon (H)        Dose:  4 mg   Take 1 tablet (4 mg) by mouth every 6 hours as needed for nausea   Quantity:  10 tablet   Refills:  0       oxaliplatin 85 mg/m2        Inject into the vein every 14 days   Refills:  0       PEPCID COMPLETE PO        Dose:  1 tablet   Take 1 tablet by mouth 2 times daily as needed (heartburn)   Refills:  0       PRIMATENE ASTHMA 12.5-200 MG Tabs   Generic drug:  ePHEDrine-GuaiFENesin        Dose:  1 tablet   Take 1 tablet by mouth At Bedtime   Refills:  0       ZYRTEC ALLERGY 10 MG tablet   Generic drug:  cetirizine        Dose:  10 mg   Take 10 mg by mouth daily   Refills:  0         STOP taking     COMPAZINE PO           PEPCID PO           UNABLE TO FIND                Where to get your medicines      These medications were sent to [facility] [address] [place] [address], [place] - [address] AT [facility] [address], [facility] [place] [place] [address]     Phone:  [phone]     pantoprazole 40 MG EC tablet    sucralfate 1 GM/10ML suspension                Protect others around you: Learn how to safely use, store and throw away your medicines at www.disposemymeds.org.             Medication List: This is a list of all your medications and when to take them. Check marks below indicate your daily home schedule. Keep this list as a reference.      Medications           Morning Afternoon Evening Bedtime As Needed    ADVIL PO   Take 600-800 mg by mouth every 8 hours as needed for moderate pain                                albuterol 108 (90 BASE) MCG/ACT Inhaler   Commonly known as:  PROAIR HFA   Inhale 2 puffs into the lungs every 4 hours as needed for shortness of breath / dyspnea                                ALOXI IV   Inject 0.25 mg into the vein every 14 days With chemo                                 aspirin 81 MG EC tablet   Take 1 tablet (81 mg) by mouth daily   Last time this was given:  81 mg on 10/26/2017 10:55 AM                                atorvastatin 40 MG tablet   Commonly known as:  LIPITOR   Take 1 tablet (40 mg) by mouth daily   Last time this was given:  40 mg on 10/25/2017  9:30 PM                                BENADRYL PO   Take 25 mg by mouth nightly as needed for sleep   Last time this was given:  25 mg on 10/25/2017  9:42 PM                                blood glucose monitoring test strip   Commonly known as:  ONE TOUCH VERIO IQ   Use to test blood sugar 2 times daily.                                COMBIVENT RESPIMAT  MCG/ACT inhaler   SEE NOTES   Generic drug:  Ipratropium-Albuterol                                dexamethasone 0.6 mg/kg/day   Inject 12 mg into the vein every 14 days With chemo                                FLUOROURACIL IV   Inject into the vein every 14 days Had pump removed 10/25/17                                FLUoxetine 20 MG capsule   Commonly known as:  PROzac   TAKE 1 CAPSULE(20 MG) BY MOUTH DAILY   Last time this was given:  20 mg on 10/26/2017 10:58 AM                                LEUCOVORIN CALCIUM IV   Inject into the vein every 14 days                                lisinopril 10 MG tablet   Commonly known as:  PRINIVIL/ZESTRIL   Take 1 tablet (10 mg) by mouth daily   Last time this was given:  10 mg on 10/26/2017 10:55 AM                                * METFORMIN HCL PO   Take 500 mg by mouth daily (with breakfast)   Last time this was given:  500 mg on 10/26/2017 10:55 AM                                * METFORMIN HCL PO   Take 1,000 mg by mouth daily (with dinner)   Last time this was given:  500 mg on 10/26/2017 10:55 AM                                MULTIVITAMIN GUMMIES ADULT PO   Take 2 chew tab by mouth daily                                ondansetron 4 MG tablet   Commonly known as:  ZOFRAN   Take 1 tablet (4 mg) by mouth every  6 hours as needed for nausea                                oxaliplatin 85 mg/m2   Inject into the vein every 14 days                                pantoprazole 40 MG EC tablet   Commonly known as:  PROTONIX   Take 1 tablet (40 mg) by mouth 2 times daily (before meals)   Last time this was given:  40 mg on 10/26/2017 10:55 AM                                PEPCID COMPLETE PO   Take 1 tablet by mouth 2 times daily as needed (heartburn)                                PRIMATENE ASTHMA 12.5-200 MG Tabs   Take 1 tablet by mouth At Bedtime   Last time this was given:  1 tablet on 10/25/2017  9:39 PM   Generic drug:  ePHEDrine-GuaiFENesin                                sucralfate 1 GM/10ML suspension   Commonly known as:  CARAFATE   Take 10 mLs (1 g) by mouth 4 times daily (before meals and nightly) for 14 days   Last time this was given:  1 g on 10/26/2017 10:56 AM                                ZYRTEC ALLERGY 10 MG tablet   Take 10 mg by mouth daily   Last time this was given:  10 mg on 10/26/2017 10:56 AM   Generic drug:  cetirizine                                * Notice:  This list has 2 medication(s) that are the same as other medications prescribed for you. Read the directions carefully, and ask your doctor or other care provider to review them with you.              More Information        Discharge Instructions for Hypokalemia  You have been diagnosed with hypokalemia. This means you have a low level of potassium in your blood. Potassium helps your nerve and muscle cells work as they should. These cells include the cells in your heart. A low level of potassium in the blood can cause serious problems, such as abnormal heart rhythms and even heart attack.  Diet changes  Eat more potassium-rich foods:    Bananas    Oranges and orange juice    Tomatoes, tomato sauce, and tomato juice    Leafy green vegetables, such as spinach, kale, salad greens, collards, and chard    Melons (all  kinds)    Pomegranates    Peas    Beans    Potatoes    Sweet potatoes    Avocados, including guacamole    Vegetable juices, such as V8    Fruit juices    All nuts and seeds    Fish, including tuna, halibut, salmon, cod, snapper, ernestine, swordfish, and perch    Milk, including fat-free, low-fat, whole, chocolate, and buttermilk    Soy milk  Other home care    Take a potassium supplement as directed by your healthcare provider.    After strenuous exercise or any activity that causes you to sweat a lot, grab a beverage high in potassium. This includes chocolate milk, coconut water, orange juice, or low-sodium vegetable juices.    Be sure to eat foods or drink fluids that contain potassium if you are having diarrhea or vomiting.    Have your potassium levels checked regularly.    Take all medicines exactly as directed.    Tell your healthcare provider about all prescription and over-the-counter medicines you are taking. This includes herbal products.    Avoid foods that are high in salt. Avoid canned and prepared foods that are high in salt.  Follow-up    Make a follow-up appointment as directed by our staff.    Keep all follow-up appointments. Your healthcare provider needs to monitor your condition closely.     When to call your healthcare provider  Call your provider right away or go to the emergency room if you have any of the following:    Vomiting    Fatigue    Diarrhea    Rapid, irregular heartbeat    Shortness of breath    Chest pain    Muscle cramps, spasms, or twitching    Weakness    Paralysis   Date Last Reviewed: 6/1/2017 2000-2017 [employer]. [address] [place] [address], [place] [address]. All rights reserved. This information is not intended as a substitute for professional medical care. Always follow your healthcare professional's instructions.                 *CHEST PAIN, UNCERTAIN CAUSE    Based on your exam today, the exact cause of your chest pain is not certain. Your condition does not  seem serious at this time, and your pain does not appear to be coming from your heart. However, sometimes the signs of a serious problem take more time to appear. Therefore, watch for the warning signs listed below.  HOME CARE:  1. Rest today and avoid strenuous activity.  2. Take any prescribed medicine as directed.  FOLLOW UP with your doctor in 1-3 days.   GET PROMPT MEDICAL ATTENTION if any of the following occur:    A change in the type of pain: if it feels different, becomes more severe, lasts longer, or begins to spread into your shoulder, arm, neck, jaw or back    Shortness of breath or increased pain with breathing    Weakness, dizziness, or fainting    Cough with blood or dark colored sputum (phlegm)    Fever over 101  F (38.3  C)    Swelling, pain or redness in one leg    9023-2883 [employer]. [address] [place] [id number]. All rights reserved. This information is not intended as a substitute for professional medical care. Always follow your healthcare professional's instructions.  This information has been modified by your health care provider with permission from the publisher.

## 2017-10-25 NOTE — ED NOTES
Presents to the ED alone with c/o upper chest tightness 2/10.  States hx of same s/sx  with Dx of esophageal spasm.  Takes pepcid and no problems since.  Pt denies any other s/sx.  Recvs chemo for coon cancer.  Assessment is complete.  Call light is given.  Monitors on pt.

## 2017-10-25 NOTE — H&P
[facility]    History and Physical  Hospitalist       Date of Admission:  10/25/2017    Assessment & Plan   Sola Gan is a 66 year old female with history of hypertension, diabetes mellitus, emphysema,sigmoid colon cancer status post resection currently getting chemotherapywe'll presented to the ED with a complaint of cough, wheezing  And chest tightness. Pt. Has been admitted for observation. Her issues are the following:    Chest pain: This is noncardiac chest pain due to mild COPD exacerbation. EKG is normal and troponins have also been normal. I don't suspect angina. D-dimer is also negative.  I will keep her on a PPI.    Mild COPD exacerbation: She is reluctant to take steroids as it kicks up the sugars.  I will keep her on oxygen supplementation and nebulizer treatments.  No need for antibiotics at the time. Patient is also on antihistamine and pseudoephedrine to relieve congestion.    Hypertension: Continue on lisinopril.  I will also give her a dose of hydralazine to lower her blood pressure.    Type 2 diabetes mellitus: I will continue her metformin.  She is reluctant to take insulin.  Will check her sugars periodically.    Hypokalemia: Replete and recheck.    Colon cancer: Continue to follow for chemotherapy as scheduled.    Hyperlipidemia: Continue on atorvastatin.    Tobacco dependence: Smoking cessation counseling done.  Nicotine patches have been started.  The patient would like to try nicotine patches at home.  She is trying to cut down.    DVT Prophylaxis: Enoxaparin (Lovenox) SQ  Code Status: Full Code    Disposition: Expected discharge in 1 day    Gloria Thomas    Primary Care Physician   Favian Recinos    Chief Complaint   Cough with chest tightness    History is obtained from the patient    History of Present Illness   Sola Gan is a 66 year old female with history of hypertension, diabetes mellitus, emphysema,sigmoid colon cancer status post resection currently getting  chemotherapywe'll presented to the ED with a complaint of cough, wheezing  And chest tightness.  The patient received her chemotherapy on Monday was doing well except for getting  Chest congestion.  She denied any fevers or chills.  Her chest tightness started with congestion.  She came to the ED where EKG and cardiac enzymes drawn multiple times were negative for any ischemia.  At the time of his sick taking the patient is still having that feeling of tightness in the chest with wheezing and congestion.  She denies any nausea or vomiting.  Denies abdominal pain diarrhea or constipation.    Past Medical History    I have reviewed this patient's medical history and updated it with pertinent information if needed.   Past Medical History:   Diagnosis Date     COPD (chronic obstructive pulmonary disease) (H)      Diabetes (H)      H/O sleep apnea     tonsils, adnoids, and uvula removed     Uncomplicated asthma     hypertension  Colon cancer status post sigmoid colon resection getting chemotherapy now.    Past Surgical History   I have reviewed this patient's surgical history and updated it with pertinent information if needed.  Past Surgical History:   Procedure Laterality Date     APPENDECTOMY       CHOLECYSTECTOMY       COLONOSCOPY N/A 6/21/2017    Procedure: COLONOSCOPY;  COLONOSCOPY WITH POLYPECTOMY, BIOPSY, ENDOSCOPIC MARKER TATOOING;  Surgeon: Rigo Coreas DO;  Location: [facility]     ENT SURGERY      for MORA     GYN SURGERY       HERNIA REPAIR       INSERT PORT VASCULAR ACCESS N/A 9/8/2017    Procedure: INSERT PORT VASCULAR ACCESS;  PORT-A-CATH PLACEMENT LEFT INTERNAL JUGULAR;  Surgeon: Awais Ding MD;  Location: [facility] OR   colon cancer status post resection september 2017    Prior to Admission Medications   Prior to Admission Medications   Prescriptions Last Dose Informant Patient Reported? Taking?   COMBIVENT RESPIMAT  MCG/ACT inhaler 10/25/2017 at Unknown time  No Yes   Sig: SEE NOTES    DiphenhydrAMINE HCl (BENADRYL PO) 10/24/2017  Yes Yes   Sig: Take 25 mg by mouth nightly as needed for sleep   FLUOROURACIL IV 10/25/2017  Yes Yes   Sig: Inject into the vein every 14 days Had pump removed 10/25/17   FLUoxetine (PROZAC) 20 MG capsule 10/24/2017 at Unknown time  No Yes   Sig: TAKE 1 CAPSULE(20 MG) BY MOUTH DAILY   Famotidine-Ca Carb-Mag Hydrox (PEPCID COMPLETE PO) 10/23/2017  Yes Yes   Sig: Take 1 tablet by mouth 2 times daily as needed (heartburn)   Ibuprofen (ADVIL PO) 10/24/2017 at Unknown time  Yes Yes   Sig: Take 600-800 mg by mouth every 8 hours as needed for moderate pain    LEUCOVORIN CALCIUM IV 10/23/2017  Yes Yes   Sig: Inject into the vein every 14 days   METFORMIN HCL PO 10/25/2017  Yes Yes   Sig: Take 500 mg by mouth daily (with breakfast)   METFORMIN HCL PO 10/24/2017  Yes Yes   Sig: Take 1,000 mg by mouth daily (with dinner)   Multiple Vitamins-Minerals (MULTIVITAMIN GUMMIES ADULT PO) 10/24/2017  Yes Yes   Sig: Take 2 chew tab by mouth daily   Palonosetron HCl (ALOXI IV) 10/23/2017  Yes Yes   Sig: Inject 0.25 mg into the vein every 14 days With chemo   albuterol (ALBUTEROL) 108 (90 BASE) MCG/ACT Inhaler 10/25/2017 at Unknown time  No Yes   Sig: Inhale 2 puffs into the lungs every 4 hours as needed for shortness of breath / dyspnea   aspirin 81 MG EC tablet 10/25/2017 at Unknown time  No Yes   Sig: Take 1 tablet (81 mg) by mouth daily   atorvastatin (LIPITOR) 40 MG tablet 10/24/2017 at Unknown time  No Yes   Sig: Take 1 tablet (40 mg) by mouth daily   Patient taking differently: Take 40 mg by mouth At Bedtime    blood glucose monitoring (ONE TOUCH VERIO IQ) test strip 10/25/2017 at Unknown time  No Yes   Sig: Use to test blood sugar 2 times daily.   cetirizine (ZYRTEC ALLERGY) 10 MG tablet 10/24/2017 at Unknown time  Yes Yes   Sig: Take 10 mg by mouth daily   dexamethasone 0.6 mg/kg/day 10/23/2017  Yes Yes   Sig: Inject 12 mg into the vein every 14 days With chemo    ePHEDrine-GuaiFENesin (PRIMATENE ASTHMA) 12.5-200 MG TABS 10/24/2017  Yes Yes   Sig: Take 1 tablet by mouth At Bedtime   lisinopril (PRINIVIL/ZESTRIL) 10 MG tablet 10/25/2017 at Unknown time  No Yes   Sig: Take 1 tablet (10 mg) by mouth daily   ondansetron (ZOFRAN) 4 MG tablet 10/23/2017 at Unknown time  No Yes   Sig: Take 1 tablet (4 mg) by mouth every 6 hours as needed for nausea   oxaliplatin 85 mg/m2 10/23/2017  Yes Yes   Sig: Inject into the vein every 14 days      Facility-Administered Medications: None     Allergies   Allergies   Allergen Reactions     Acyclovir Nausea     Sulfa Drugs Hives       Social History   I have reviewed this patient's social history and updated it with pertinent information if needed. Sola Gan  reports that she has been smoking.  She has a 15.00 pack-year smoking history. She has never used smokeless tobacco. She reports that she does not drink alcohol or use illicit drugs.    Family History   I have reviewed this patient's family history and updated it with pertinent information if needed.   Family History   Problem Relation Age of Onset     Other Cancer Mother      ovarian      Breast Cancer Mother      Bronchitis Father      Peripheral Neuropathy Father      Hypertension Father        Review of Systems   GENERAL/CONSTITUTIONAL: The patient denies fever, fatigue, weakness, weight gain or weight loss.  HEAD, EYES, EARS, NOSE AND THROAT: Eyes - The patient denies pain, redness, loss of vision,blurred vision,Ears, nose, mouth and throat. The patient denies ringing in the ears, loss of hearing, nosebleeds, loss of sense of smell,   CARDIOVASCULAR: Chest pain as per HPI.The patient denies irregular heartbeats, palpitation, shortness of breath, orthopnea or PND  RESPIRATORY: complaint of wheezing and chest congestion.  No fevers or chills.  Cough with small amounts of sputum production.  GASTROINTESTINAL: The patient denies decreased appetite, nausea, vomiting, ,hematochezia,  melena or hematemesis. No abdominal pain  GENITOURINARY: The patient denies difficult urination, pain or burning with urination, blood in the urine,  MUSCULOSKELETAL: The patient denies muscle cramps. No joint or muscle pain. No muscle weakness or tenderness.  SKIN : The patient denies easy bruising, skin redness, skin rash, hives, sensitivity to sun exposure or new rashes  NEUROLOGIC: The patient denies headache, dizziness, fainting, seizures, loss of consciousness, sensitivity or pain in the hands and feet or memory loss.  PSYCHIATRIC: The patient denies  thoughts of suicide,  ENDOCRINE: The patient denies intolerance to hot or cold temperature,  HEMATOLOGIC/LYMPHATIC: The patient denies anemia, bleeding tendency or clotting tendency.  ALLERGIC/IMMUNOLOGIC: the patient has seasonal allergies and gets nasal congestion due to cold    Physical Exam   Temp: 99.1  F (37.3  C) Temp src: Tympanic BP: 162/81 Pulse: 95 Heart Rate: 93 Resp: 16 SpO2: 96 % O2 Device: None (Room air)    Vital Signs with Ranges  Temp:  [98.1  F (36.7  C)-99.1  F (37.3  C)] 99.1  F (37.3  C)  Pulse:  [95] 95  Heart Rate:  [80-99] 93  Resp:  [12-16] 16  BP: (124-173)/() 162/81  SpO2:  [93 %-99 %] 96 %  0 lbs 0 oz  GENERAL APPEARANCE: The patient is  well-developed, well-nourished ,  in no acute distress.  HEENT: Normocephalic and atraumatic. No scleral icterus.PERRLA. No conjunctival injection is noted. No oropharyngeal lesions.  NECK: Supple. Trachea is midline. No evidence of thyroid enlargement. No lymphadenopathy or tenderness.  CHEST: Symmetric. Nontender to palpation.  LUNGS: bilateral equal air entry with minimal expiratory wheezing but no crackles  HEART: Regular rate and rhythm with normal S1 and S2. No murmurs, gallops, or rubs.  ABDOMEN: Soft, flat, and benign. No mass, tenderness, guarding, or rebound. No organomegaly or hernia. Bowel sounds are present. No CVA tenderness or flank mass.  RECTAL: Deferred  EXTREMITIES: No  cyanosis, clubbing, or edema.  NEUROLOGIC: No focal sensory or motor deficits are noted. Cranial nerves II through XII are intact. Grossly nonfocal examination  PSYCHIATRIC: Appropriate mood and affect.  SKIN: Warm, dry, and well perfused. Good turgor. No lesions, nodules or rashes are noted.   LYMPHATICS: No cervical, axillary, or groin adenopathy is noted.    Data   Data reviewed today:  I personally reviewed normal sinus rhythm, normal axis, no acute changes     Recent Labs  Lab 10/25/17  1416 10/25/17  1121   WBC  --  2.5*   HGB  --  14.4   MCV  --  89   PLT  --  158   NA  --  137   POTASSIUM  --  3.3*   CHLORIDE  --  102   CO2  --  26   BUN  --  16   CR  --  0.70   ANIONGAP  --  9   SHUN  --  9.1   GLC  --  122*   ALBUMIN  --  3.8   PROTTOTAL  --  7.8   BILITOTAL  --  0.4   ALKPHOS  --  87   ALT  --  46   AST  --  27   TROPI <0.015 <0.015       Recent Results (from the past 24 hour(s))   XR Chest 2 Views    Narrative    PROCEDURE:  XR CHEST 2 VW    HISTORY: chest tightness, .    COMPARISON:  6/30/2017    FINDINGS: A left chest port is in appropriate position.  The cardiomediastinal contours are normal.  The trachea is midline.  No focal consolidation, effusion or pneumothorax.    No suspicious osseous lesion or subdiaphragmatic free air.      Impression    IMPRESSION:      No acute cardiopulmonary process.      SUJEY STOREY MD       Total time spent in patient care was 55 minutes. >  50% of time was spent in coordination of care and patient counseling.  Discussed with the patient in detail.

## 2017-10-25 NOTE — ED PROVIDER NOTES
History     Chief Complaint   Patient presents with     Chest Pain     Upper chest pain started this morning, chest tightness, dull pain denies SOB, 2/10     The history is provided by the patient.     Sola Gan is a 66 year old female who presented to the ED from oncology for evaluation of chest tightness.  The tightness was noticed this morning.  No hx of CAD.  She does have multiple risk factors.  Currently being treated for colon cancer.  Pain is described as a dull tightness across the anterior chest.  Does not radiate.  No cough.  No dyspnea.  No diaphoresis.  No abdominal pain.  4/10 scale.     Problem List:    Patient Active Problem List    Diagnosis Date Noted     Adenocarcinoma of sigmoid colon (H) 09/05/2017     Priority: Medium     Colon tumor 07/17/2017     Priority: Medium     Controlled type 2 diabetes mellitus without complication, without long-term current use of insulin (H) 05/18/2017     Priority: Medium     Seasonal allergic rhinitis due to pollen 04/13/2017     Priority: Medium     ACP (advance care planning) 03/22/2017     Priority: Medium     Advance Care Planning 3/22/2017: ACP Review of Chart / Resources Provided:  Reviewed chart for advance care plan.  Sola Gan has no plan or code status on file. Discussed available resources and provided with information. Confirmed code status reflects current choices pending further ACP discussions.  Confirmed/documented legally designated decision makers.  Added by Tia Virgen           COPD (chronic obstructive pulmonary disease) probable 03/30/2014     Priority: Medium     Acute bronchitis 03/30/2014     Priority: Medium     Morbid obesity (H) 03/30/2014     Priority: Medium     Essential hypertension 03/30/2014     Priority: Medium     Stasis dermatitis of both legs 03/30/2014     Priority: Medium     MORA (obstructive sleep apnea) 03/30/2014     Priority: Medium     Acute bronchitis with asthma 03/30/2014     Priority: Medium         Past Medical History:    Past Medical History:   Diagnosis Date     COPD (chronic obstructive pulmonary disease) (H)      Diabetes (H)      H/O sleep apnea      Uncomplicated asthma        Past Surgical History:    Past Surgical History:   Procedure Laterality Date     APPENDECTOMY       CHOLECYSTECTOMY       COLONOSCOPY N/A 6/21/2017    Procedure: COLONOSCOPY;  COLONOSCOPY WITH POLYPECTOMY, BIOPSY, ENDOSCOPIC MARKER TATOOING;  Surgeon: Rigo Coreas DO;  Location: [facility]     ENT SURGERY      for MORA     GYN SURGERY       HERNIA REPAIR       INSERT PORT VASCULAR ACCESS N/A 9/8/2017    Procedure: INSERT PORT VASCULAR ACCESS;  PORT-A-CATH PLACEMENT LEFT INTERNAL JUGULAR;  Surgeon: Awais Ding MD;  Location: [facility] OR       Family History:    Family History   Problem Relation Age of Onset     Other Cancer Mother      ovarian      Breast Cancer Mother      Bronchitis Father      Peripheral Neuropathy Father      Hypertension Father        Social History:  Marital Status:  Single [1]  Social History   Substance Use Topics     Smoking status: Current Every Day Smoker     Packs/day: 0.75     Years: 20.00     Smokeless tobacco: Never Used     Alcohol use No        Medications:      FLUoxetine (PROZAC) 20 MG capsule   metFORMIN (GLUCOPHAGE) 500 MG tablet   ondansetron (ZOFRAN) 4 MG tablet   cetirizine (ZYRTEC ALLERGY) 10 MG tablet   UNABLE TO FIND   COMBIVENT RESPIMAT  MCG/ACT inhaler   lisinopril (PRINIVIL/ZESTRIL) 10 MG tablet   aspirin 81 MG EC tablet   atorvastatin (LIPITOR) 40 MG tablet   blood glucose monitoring (ONE TOUCH VERIO IQ) test strip   Ibuprofen (ADVIL PO)   albuterol (ALBUTEROL) 108 (90 BASE) MCG/ACT Inhaler   Famotidine (PEPCID PO)   Prochlorperazine Maleate (COMPAZINE PO)         Review of Systems   Constitutional: Negative for activity change, appetite change, chills, fatigue and fever.   Respiratory: Positive for chest tightness. Negative for cough and shortness of breath.     Cardiovascular:        See HPI    Gastrointestinal: Negative for abdominal pain, nausea and vomiting.   Genitourinary: Negative.    Musculoskeletal: Negative for back pain.   Skin: Negative.    Neurological: Negative for dizziness, light-headedness and headaches.       Physical Exam   BP: (!) 168/104  Heart Rate: 89  Temp: 98.4  F (36.9  C)  Resp: 12  SpO2: 98 %      Physical Exam   Constitutional: She is oriented to person, place, and time. She appears well-developed and well-nourished. No distress.   Pleasant and talkative    Cardiovascular: Normal rate and regular rhythm.    Pulmonary/Chest: Effort normal and breath sounds normal.   Abdominal: Soft. There is no tenderness.   Neurological: She is alert and oriented to person, place, and time.   Skin: Skin is warm and dry.   Psychiatric: She has a normal mood and affect.   Nursing note and vitals reviewed.      ED Course     ED Course     Procedures          EKG shows a NSR without ST or T wave concerns.       CXR negative for PTX, widened mediastinum, or focal infiltrate.     Medications   nitroGLYcerin (NITROSTAT) sublingual tablet 0.4 mg (0.4 mg Sublingual Given 10/25/17 1119)   aspirin chewable tablet 162 mg (162 mg Oral Given 10/25/17 1118)   LORazepam (ATIVAN) injection 0.5 mg (0.5 mg Intravenous Given 10/25/17 1207)   potassium chloride SA (K-DUR/KLOR-CON M) CR tablet 20 mEq (20 mEq Oral Given 10/25/17 1304)     Results for orders placed or performed during the hospital encounter of 10/25/17 (from the past 24 hour(s))   CBC with platelets differential   Result Value Ref Range    WBC 2.5 (L) 4.0 - 11.0 10e9/L    RBC Count 4.85 3.8 - 5.2 10e12/L    Hemoglobin 14.4 11.7 - 15.7 g/dL    Hematocrit 43.0 35.0 - 47.0 %    MCV 89 78 - 100 fl    MCH 29.7 26.5 - 33.0 pg    MCHC 33.5 31.5 - 36.5 g/dL    RDW 16.6 (H) 10.0 - 15.0 %    Platelet Count 158 150 - 450 10e9/L    Diff Method Automated Method     % Neutrophils 43.3 %    % Lymphocytes 39.3 %    % Monocytes 8.9 %     % Eosinophils 6.5 %    % Basophils 1.6 %    % Immature Granulocytes 0.4 %    Nucleated RBCs 0 0 /100    Absolute Neutrophil 1.1 (L) 1.6 - 8.3 10e9/L    Absolute Lymphocytes 1.0 0.8 - 5.3 10e9/L    Absolute Monocytes 0.2 0.0 - 1.3 10e9/L    Absolute Eosinophils 0.2 0.0 - 0.7 10e9/L    Absolute Basophils 0.0 0.0 - 0.2 10e9/L    Abs Immature Granulocytes 0.0 0 - 0.4 10e9/L    Absolute Nucleated RBC 0.0    Comprehensive metabolic panel   Result Value Ref Range    Sodium 137 133 - 144 mmol/L    Potassium 3.3 (L) 3.4 - 5.3 mmol/L    Chloride 102 94 - 109 mmol/L    Carbon Dioxide 26 20 - 32 mmol/L    Anion Gap 9 3 - 14 mmol/L    Glucose 122 (H) 70 - 99 mg/dL    Urea Nitrogen 16 7 - 30 mg/dL    Creatinine 0.70 0.52 - 1.04 mg/dL    GFR Estimate 84 >60 mL/min/1.7m2    GFR Estimate If Black >90 >60 mL/min/1.7m2    Calcium 9.1 8.5 - 10.1 mg/dL    Bilirubin Total 0.4 0.2 - 1.3 mg/dL    Albumin 3.8 3.4 - 5.0 g/dL    Protein Total 7.8 6.8 - 8.8 g/dL    Alkaline Phosphatase 87 40 - 150 U/L    ALT 46 0 - 50 U/L    AST 27 0 - 45 U/L   D-Dimer ([facility])   Result Value Ref Range    D-Dimer ng/mL 247 0 - 300 ng/ml D-DU   Troponin I   Result Value Ref Range    Troponin I ES <0.015 0.000 - 0.045 ug/L   XR Chest 2 Views    Narrative    PROCEDURE:  XR CHEST 2 VW    HISTORY: chest tightness, .    COMPARISON:  6/30/2017    FINDINGS: A left chest port is in appropriate position.  The cardiomediastinal contours are normal.  The trachea is midline.  No focal consolidation, effusion or pneumothorax.    No suspicious osseous lesion or subdiaphragmatic free air.      Impression    IMPRESSION:      No acute cardiopulmonary process.      SUJEY STOREY MD   Troponin I   Result Value Ref Range    Troponin I ES <0.015 0.000 - 0.045 ug/L       Critical Care time:  none               Labs Ordered and Resulted from Time of ED Arrival Up to the Time of Departure from the ED   CBC WITH PLATELETS DIFFERENTIAL - Abnormal; Notable for the following:         Result Value    WBC 2.5 (*)     RDW 16.6 (*)     Absolute Neutrophil 1.1 (*)     All other components within normal limits   COMPREHENSIVE METABOLIC PANEL - Abnormal; Notable for the following:     Potassium 3.3 (*)     Glucose 122 (*)     All other components within normal limits   D-DIMER ([facility])   TROPONIN I   TROPONIN I   IMPLANTED VASCULAR ACCESS PORT       Assessments & Plan (with Medical Decision Making)   Sola and I had a long and detailed discussion regarding her chest pain and risk factors.  I set her up for a tress test tomorrow morning.  Sola is very hesitant on going home with her pain.  Per the HEART score, she does fit criteria for standard rule out and stress testing.  Discussed with Dr. Perez, who graciously agreed to observation.       ASHLEY Score (calculator)  Background  Calculates the overall risk of death, MI or revascularization within 2 weeks of chest pain or coronary equivalent evaluation.  Based on 7 criteria including age >65, aspirin, recent angina, ST changes on EKG, increased cardiac markers, known CAD, and three or more CAD risks.  Data  66 year old  has COPD (chronic obstructive pulmonary disease) probable; Acute bronchitis; Morbid obesity (H); Essential hypertension; Stasis dermatitis of both legs; MORA (obstructive sleep apnea); Acute bronchitis with asthma; ACP (advance care planning); Seasonal allergic rhinitis due to pollen; Controlled type 2 diabetes mellitus without complication, without long-term current use of insulin (H); Colon tumor; and Adenocarcinoma of sigmoid colon (H) on her problem list.   reports that she has been smoking.  She has a 15.00 pack-year smoking history. She has never used smokeless tobacco.  family history includes Breast Cancer in her mother; Bronchitis in her father; Hypertension in her father; Other Cancer in her mother; Peripheral Neuropathy in her father.  @[email or web address]@  Lab Results   Component Value Date    TROPI <0.015 10/25/2017     Criteria   One  point for each of 7 possible items:  Aspirin used in the last 7 days  At least 3 cardiac risk factors (e.g. hypertension, hyperlipidemia, premature CAD Family history, tobacco abuse)  Interpretation  2 week subsequent risk of death, myocardial infarction or need for revascularization  ASHLEY Score: 2  Score 2: 8.3% risk              HEART Score  Background  Calculates the overall risk of adverse event in patient's presenting with chest pain.  Based on 5 criteria (each assigned 0-2 points) including suspiciousness of history, EKG, age, risk factors and troponin.    Data  66 year old female  has COPD (chronic obstructive pulmonary disease) probable; Acute bronchitis; Morbid obesity (H); Essential hypertension; Stasis dermatitis of both legs; MORA (obstructive sleep apnea); Acute bronchitis with asthma; ACP (advance care planning); Seasonal allergic rhinitis due to pollen; Controlled type 2 diabetes mellitus without complication, without long-term current use of insulin (H); Colon tumor; and Adenocarcinoma of sigmoid colon (H) on her problem list.   reports that she has been smoking.  She has a 15.00 pack-year smoking history. She has never used smokeless tobacco.  family history includes Breast Cancer in her mother; Bronchitis in her father; Hypertension in her father; Other Cancer in her mother; Peripheral Neuropathy in her father.  Lab Results   Component Value Date    TROPI <0.015 10/25/2017     Criteria   0-2 points for each of 5 items (maximum of 10 points):  Score 1- History moderately suspicious for coronary syndrome  Score 0- EKG Normal  Score 1- Age 45 to 65 years old  Score 2- Three or more risk factors for or history of atherosclerotic disease  Score 0- Within normal limits for troponin levels  Interpretation  Risk of adverse outcome  Heart Score: 4  Total Score 4-6- Adverse Outcome Risk 20.3% - Supports admission with standard rule-out management -serial troponins and stress testing          I have reviewed  the nursing notes.    I have reviewed the findings, diagnosis, plan and need for follow up with the patient.     New Prescriptions    No medications on file       Final diagnoses:   Chest pain, unspecified type   Hypokalemia   Chemotherapy-induced neutropenia (H)       10/25/2017   [facility]     Radha Matute, LEVI  10/25/17 [id number]

## 2017-10-26 ENCOUNTER — HOSPITAL ENCOUNTER (OUTPATIENT)
Dept: NUCLEAR MEDICINE | Facility: HOSPITAL | Age: 67
Setting detail: NUCLEAR MEDICINE
End: 2017-10-26
Attending: PHYSICIAN ASSISTANT
Payer: COMMERCIAL

## 2017-10-26 ENCOUNTER — HOSPITAL ENCOUNTER (OUTPATIENT)
Dept: CARDIOLOGY | Facility: HOSPITAL | Age: 67
Setting detail: OBSERVATION
End: 2017-10-26
Attending: PHYSICIAN ASSISTANT
Payer: COMMERCIAL

## 2017-10-26 VITALS
RESPIRATION RATE: 18 BRPM | OXYGEN SATURATION: 99 % | TEMPERATURE: 99 F | DIASTOLIC BLOOD PRESSURE: 78 MMHG | HEART RATE: 85 BPM | SYSTOLIC BLOOD PRESSURE: 127 MMHG

## 2017-10-26 DIAGNOSIS — R07.9 CHEST PAIN, UNSPECIFIED TYPE: ICD-10-CM

## 2017-10-26 LAB
BASOPHILS # BLD AUTO: 0.1 10E9/L (ref 0–0.2)
BASOPHILS NFR BLD AUTO: 2 %
DIFFERENTIAL METHOD BLD: ABNORMAL
EOSINOPHIL # BLD AUTO: 0.2 10E9/L (ref 0–0.7)
EOSINOPHIL NFR BLD AUTO: 8.9 %
ERYTHROCYTE [DISTWIDTH] IN BLOOD BY AUTOMATED COUNT: 16.5 % (ref 10–15)
EST. AVERAGE GLUCOSE BLD GHB EST-MCNC: 140 MG/DL
GLUCOSE BLDC GLUCOMTR-MCNC: 152 MG/DL (ref 70–99)
HBA1C MFR BLD: 6.5 % (ref 4.3–6)
HCT VFR BLD AUTO: 39.3 % (ref 35–47)
HGB BLD-MCNC: 13.2 G/DL (ref 11.7–15.7)
IMM GRANULOCYTES # BLD: 0 10E9/L (ref 0–0.4)
IMM GRANULOCYTES NFR BLD: 0.4 %
LYMPHOCYTES # BLD AUTO: 1.1 10E9/L (ref 0.8–5.3)
LYMPHOCYTES NFR BLD AUTO: 43.5 %
MCH RBC QN AUTO: 29.6 PG (ref 26.5–33)
MCHC RBC AUTO-ENTMCNC: 33.6 G/DL (ref 31.5–36.5)
MCV RBC AUTO: 88 FL (ref 78–100)
MONOCYTES # BLD AUTO: 0.2 10E9/L (ref 0–1.3)
MONOCYTES NFR BLD AUTO: 7.7 %
NEUTROPHILS # BLD AUTO: 0.9 10E9/L (ref 1.6–8.3)
NEUTROPHILS NFR BLD AUTO: 37.5 %
NRBC # BLD AUTO: 0 10*3/UL
NRBC BLD AUTO-RTO: 0 /100
PLATELET # BLD AUTO: 153 10E9/L (ref 150–450)
POTASSIUM SERPL-SCNC: 4.2 MMOL/L (ref 3.4–5.3)
RBC # BLD AUTO: 4.46 10E12/L (ref 3.8–5.2)
WBC # BLD AUTO: 2.5 10E9/L (ref 4–11)

## 2017-10-26 PROCEDURE — 99217 ZZC OBSERVATION CARE DISCHARGE: CPT | Performed by: NURSE PRACTITIONER

## 2017-10-26 PROCEDURE — 36415 COLL VENOUS BLD VENIPUNCTURE: CPT | Performed by: HOSPITALIST

## 2017-10-26 PROCEDURE — 94640 AIRWAY INHALATION TREATMENT: CPT | Mod: 76

## 2017-10-26 PROCEDURE — 93017 CV STRESS TEST TRACING ONLY: CPT

## 2017-10-26 PROCEDURE — 40000788 ZZHCL STATISTIC ESTIMATED AVERAGE GLUCOSE: Performed by: HOSPITALIST

## 2017-10-26 PROCEDURE — 94640 AIRWAY INHALATION TREATMENT: CPT

## 2017-10-26 PROCEDURE — 25000128 H RX IP 250 OP 636: Performed by: NURSE PRACTITIONER

## 2017-10-26 PROCEDURE — 25000132 ZZH RX MED GY IP 250 OP 250 PS 637

## 2017-10-26 PROCEDURE — 96375 TX/PRO/DX INJ NEW DRUG ADDON: CPT | Mod: XU | Performed by: NURSE PRACTITIONER

## 2017-10-26 PROCEDURE — 83036 HEMOGLOBIN GLYCOSYLATED A1C: CPT | Performed by: HOSPITALIST

## 2017-10-26 PROCEDURE — 84132 ASSAY OF SERUM POTASSIUM: CPT | Performed by: HOSPITALIST

## 2017-10-26 PROCEDURE — 85025 COMPLETE CBC W/AUTO DIFF WBC: CPT | Performed by: NURSE PRACTITIONER

## 2017-10-26 PROCEDURE — 25000132 ZZH RX MED GY IP 250 OP 250 PS 637: Performed by: HOSPITALIST

## 2017-10-26 PROCEDURE — 25000125 ZZHC RX 250: Performed by: HOSPITALIST

## 2017-10-26 PROCEDURE — G0378 HOSPITAL OBSERVATION PER HR: HCPCS

## 2017-10-26 PROCEDURE — 25000128 H RX IP 250 OP 636: Performed by: HOSPITALIST

## 2017-10-26 PROCEDURE — 93016 CV STRESS TEST SUPVJ ONLY: CPT | Performed by: INTERNAL MEDICINE

## 2017-10-26 PROCEDURE — 93018 CV STRESS TEST I&R ONLY: CPT | Performed by: INTERNAL MEDICINE

## 2017-10-26 RX ORDER — ALPRAZOLAM 0.5 MG
0.5 TABLET ORAL
Status: DISCONTINUED | OUTPATIENT
Start: 2017-10-26 | End: 2017-10-26 | Stop reason: HOSPADM

## 2017-10-26 RX ORDER — PANTOPRAZOLE SODIUM 40 MG/1
40 TABLET, DELAYED RELEASE ORAL
Qty: 60 TABLET | Refills: 1 | Status: SHIPPED | OUTPATIENT
Start: 2017-10-26 | End: 2017-12-21

## 2017-10-26 RX ORDER — SUCRALFATE ORAL 1 G/10ML
1 SUSPENSION ORAL
Qty: 560 ML | Refills: 0 | Status: SHIPPED | OUTPATIENT
Start: 2017-10-26 | End: 2017-11-09

## 2017-10-26 RX ORDER — HEPARIN SODIUM,PORCINE 10 UNIT/ML
5-10 VIAL (ML) INTRAVENOUS
Status: DISCONTINUED | OUTPATIENT
Start: 2017-10-26 | End: 2017-10-26

## 2017-10-26 RX ORDER — HEPARIN SODIUM (PORCINE) LOCK FLUSH IV SOLN 100 UNIT/ML 100 UNIT/ML
5 SOLUTION INTRAVENOUS
Status: DISCONTINUED | OUTPATIENT
Start: 2017-10-26 | End: 2017-10-26

## 2017-10-26 RX ORDER — HEPARIN SODIUM (PORCINE) LOCK FLUSH IV SOLN 100 UNIT/ML 100 UNIT/ML
5 SOLUTION INTRAVENOUS EVERY 8 HOURS PRN
Status: DISCONTINUED | OUTPATIENT
Start: 2017-10-26 | End: 2017-10-26

## 2017-10-26 RX ORDER — HEPARIN SODIUM,PORCINE 10 UNIT/ML
5-10 VIAL (ML) INTRAVENOUS EVERY 24 HOURS
Status: DISCONTINUED | OUTPATIENT
Start: 2017-10-26 | End: 2017-10-26

## 2017-10-26 RX ADMIN — Medication 10 MILLICURIE: at 09:37

## 2017-10-26 RX ADMIN — CETIRIZINE HYDROCHLORIDE 10 MG: 10 TABLET, FILM COATED ORAL at 10:56

## 2017-10-26 RX ADMIN — PANTOPRAZOLE SODIUM 40 MG: 40 TABLET, DELAYED RELEASE ORAL at 10:55

## 2017-10-26 RX ADMIN — IPRATROPIUM BROMIDE AND ALBUTEROL SULFATE 3 ML: .5; 3 SOLUTION RESPIRATORY (INHALATION) at 11:51

## 2017-10-26 RX ADMIN — SUCRALFATE 1 G: 1 SUSPENSION ORAL at 10:56

## 2017-10-26 RX ADMIN — Medication 0.5 MG: at 00:13

## 2017-10-26 RX ADMIN — ALPRAZOLAM 0.5 MG: 0.5 TABLET ORAL at 00:13

## 2017-10-26 RX ADMIN — FLUOXETINE 20 MG: 20 CAPSULE ORAL at 10:58

## 2017-10-26 RX ADMIN — SODIUM CHLORIDE, PRESERVATIVE FREE 5 ML: 5 INJECTION INTRAVENOUS at 14:50

## 2017-10-26 RX ADMIN — LISINOPRIL 10 MG: 10 TABLET ORAL at 10:55

## 2017-10-26 RX ADMIN — ONDANSETRON 4 MG: 2 INJECTION INTRAMUSCULAR; INTRAVENOUS at 00:02

## 2017-10-26 RX ADMIN — ASPIRIN 81 MG: 81 TABLET, COATED ORAL at 10:55

## 2017-10-26 RX ADMIN — Medication 10 MILLICURIE: at 07:44

## 2017-10-26 RX ADMIN — IPRATROPIUM BROMIDE AND ALBUTEROL SULFATE 3 ML: .5; 3 SOLUTION RESPIRATORY (INHALATION) at 07:05

## 2017-10-26 RX ADMIN — METFORMIN HYDROCHLORIDE 500 MG: 500 TABLET ORAL at 10:55

## 2017-10-26 RX ADMIN — SODIUM CHLORIDE, PRESERVATIVE FREE 5 ML: 5 INJECTION INTRAVENOUS at 08:16

## 2017-10-26 ASSESSMENT — PAIN DESCRIPTION - DESCRIPTORS: DESCRIPTORS: SORE

## 2017-10-26 NOTE — PROGRESS NOTES
[facility] - Respiratory Clinical Assessment    Current Patient History:    Respiratory History: COPD    Smoking History: 1/2 ppd    Oxygen dependency: No,    Oxygen prescribed: N/A    10/25/2017 8:28 PM Patient Initial Assessment:     Level of Consciousness: alert , cooperative    Skin color: pink    Lung sounds:faint wheeze on R     Respirations:  Normal with easy respirations and no use of accessory muscles to breathe    Respiratory symptoms: non-productive cough    Cough/Sputum:  dry  Current oxygenation status: 97% r/a

## 2017-10-26 NOTE — PROVIDER NOTIFICATION
Dr. Thomas notified pt having nausea, BP elevated 196/83, SR 90's per ICU, new orders being placed for Xanax and Zofran.

## 2017-10-26 NOTE — PROVIDER NOTIFICATION
MD paged in regards to clarifying nuclear med order. MD states the test is necessary. Nuclear med informed. No caffeine in the last 24 hours and NPO since 2300 10/25/17

## 2017-10-26 NOTE — PLAN OF CARE
Problem: Patient Care Overview  Goal: Plan of Care/Patient Progress Review  Outcome: No Change    10/26/17 0101   OTHER   Plan Of Care Reviewed With patient   Plan of Care Review   Progress no change   Outcome Summary Pt will not have any chest discomfort this shift.          Pt is afebrile, complains of chest discomfort upon arrival to the floor. Pt walked up to the floor with transport tech. Pt BP has been up and down this shift and is as charted. Lungs are clear and bowels are active. Pt BG are as charted. Pt does not have any skin issues at this time, she does have some venous stasis changes to her bilateral lower extremities.      Face to face report given with opportunity to observe patient.     Report given to CYNDI Marina RN  10/26/2017  1:07 AM

## 2017-10-26 NOTE — PLAN OF CARE
Problem: Patient Care Overview  Goal: Discharge Needs Assessment  Outcome: Adequate for Discharge Date Met:  10/26/17  Patient discharged on afternoon shift. All discharge instructions given to by this staff. Discharged at 1530 via ambulation accompanied by staff. Prescriptions sent to patients preferred pharmacy. All belongings sent with patient.      Discharge instructions reviewed with Sola. All belongings gathered and returned to patient.      Patient discharged to home.   Report called to N/A     Core Measures and Vaccines  Core Measures applicable during stay: No. If yes, state diagnosis: N/A  Pneumonia and Influenza given prior to discharge, if indicated: N/A     Surgical Patient   Surgical Procedures during stay: No  Did patient receive discharge instruction on wound care and recognition of infection symptoms? N/A     MISC  Follow up appointment made:  Yes  Home and hospital aquired medications returned to patient: Yes  Patient reports pain was well managed at discharge: Yes

## 2017-10-26 NOTE — PLAN OF CARE
Pt to nuclear med accompanied by staff. Port deaccessed and hep locked, peripheral IV started in the L hand, pt vided and has only consumed caffeine free soda and sue crackers.

## 2017-10-26 NOTE — PLAN OF CARE
Problem: Patient Care Overview  Goal: Plan of Care/Patient Progress Review  Outcome: No Change  Assumed care of patient from CYNDI Alvarado around 0400. Blood pressures have platued and become stable. Most recent set of vitals /75  Pulse 95  Temp 97.7  F (36.5  C) (Tympanic)  Resp 18  SpO2 95%. Denies any chest pain or difficulty breathing. Patient states that she is feeling much better. Port accessed and running TKO.         Face to face report given with opportunity to observe patient.    Report given to CYNDI Parr and CYNDI Linda   10/26/2017  7:18 AM

## 2017-10-26 NOTE — PLAN OF CARE
[facility] Inpatient Admission Note:    Patient admitted to 3102/3102-1 at approximately 1610 via ambulation accompanied by transport tech from emergency room . Report received from Tatyana in SBAR format at 1542 via telephone. Patient ambulated to bed via self.. Patient is alert and oriented X 3, reports pain; rates at 0 on 0-10 scale, reports chest pressure that is unchanged since coming to the ED.  Patient oriented to room, unit, hourly rounding, and plan of care. Explained admission packet and patient handbook with patient bill of rights brochure. Will continue to monitor and document as needed.     Inpatient Nursing criteria listed below was met:      Health care directives status obtained and documented: No      Care Everywhere authorization obtained No      MRSA swab completed for patient 65 years and older: N/A      Patient identifies a surrogate decision maker: Yes If yes, who: Daughter Contact Information:On Pt board     Emergency Contact listed on Facesheet      Core Measure diagnosis present:No.        If initial lactic acid >2.0, repeat lactic acid drawn within one hour of arrival to unit: NA.       Vaccination assessment and education completed: No         Clergy visit ordered if patient requests: N/A      Skin issues/needs documented: N/A    Isolation Patient: no    Fall Prevention Yes: Care plan updated, education given and documented, sticker and magnet in place: N/A      Care Plan initiated: Yes      Education Documented (including assessment): Yes      Patient has discharge needs : No

## 2017-10-26 NOTE — PLAN OF CARE
Face to face report given with opportunity to observe patient.    Report given to CYNDI Solis   10/26/2017  3:52 AM

## 2017-10-26 NOTE — PROGRESS NOTES
Assessment completed see flowsheet.    LOC: alert, oriented X3, very pleasant and talkative  Others present: Patient    Dx: Chest pain/mild COPD exaserbation    Lives with: Lives With: child(jayla), adult, grandchild(jayla); lives with her daughter Mali and Mali's  and 3 children  Living at: Living Arrangements: house in [place]  Transportation: YES , drives, as does Mali    Equipment used:  Walking stick  Support System: Description of Support System: Supportive, Involved  Homecare//County Services:   None; has a  twice/month hired privately  [place]: NO      [facility] Referral line called: na    Primary PCP: Favian Recinos  Her oncologist is Dr RICHIE Tello; she has received 4 of her 12 treatments at this point  Health Care Directive: NO, but is accepting of the information    Pharmacy: [facility]  Meds and appointments management: YES, independent and without difficulty    Adequate Resources for needs (housing, utilities, food/med): YES    ADLs: independent  Ambulation:independent, uses a walking stick at night due to some off/on dizziness   Falls: Yes  Reason for falls risk: Other- accident, big item she was near fell over causing her to lose balance and fall    Mental health: uses Prozac with good control of her depression; becomes agitated at times but does not feel she needs to address it at this time  Substance abuse: continues to smoke, not ready to quit, no ETOH nor street drugs X past 30 years; voiced that she is very upset that despite not having had sex X 30 years she was still recently told she has herpes.  Exposure to violence/abuse: in the remote past, no concerns at this time  Stressors: no additional concerns    Able to Return to Prior Living Arrangements: YES    Goals: return home to [facility]; plans to drive herself    Barriers: none known    Needs: received Senior Linkage Line information, was given cancer support group information    EELANOR:  none

## 2017-10-26 NOTE — PROVIDER NOTIFICATION
10/25/17 2357   Vital Signs   Temp 97.7  F (36.5  C)   Temp src Tympanic   Resp 18   Heart Rate 88   Pulse/Heart Rate Source Monitor   /74  (right FA)   Oxygen Therapy   SpO2 95 %   O2 Device None (Room air)   Pain/Comfort   Patient Currently in Pain denies       Patient wanted to have blood pressures taken at different site; using a different cuff to the right forearm blood pressure was lower X2 times.  Also called Dr. Thomas and clarified xanax order, see MAR.

## 2017-10-26 NOTE — PROGRESS NOTES
Name: Sola Gan    MRN#: [id number]    Reason for Hospitalization: Hypokalemia [E87.6]  Chemotherapy-induced neutropenia (H) [D70.1, T45.1X5A]  Chest pain, unspecified type [R07.9]    ELEANOR: none    Discharge Date: 10/26/2017    Patient / Family response to discharge plan: understands and agrees    Follow-Up Appt: No future appointments.    Other Providers (Care Coordinator, County Services, PCA services etc): No    Discharge Disposition: home via self    Julia Mcmahon RN

## 2017-10-26 NOTE — PROVIDER NOTIFICATION
Dr. Thomas notified of pt new right shoulder pain 3/10 radiating to her back, VSS given and new orders received to give previously ordered medication, EKG, and Dilaudid for pain. Pt was SR 70's per ICU.

## 2017-10-26 NOTE — DISCHARGE SUMMARY
[facility]    Discharge Summary  Hospitalist    Date of Admission:  10/25/2017  Date of Discharge:  10/26/2017  Discharging Provider: Vandana Barclay CNP  Date of Service (when I saw the patient): 10/26/17    Discharge Diagnoses   Principal Problem:    Chest pain  Active Problems:    COPD (chronic obstructive pulmonary disease) probable    Acute bronchitis    Morbid obesity (H)    Essential hypertension    MORA (obstructive sleep apnea)    Seasonal allergic rhinitis due to pollen    Controlled type 2 diabetes mellitus without complication, without long-term current use of insulin (H)    Hypokalemia      History of Present Illness   Sola Gan is a 66 year old female with history of hypertension, diabetes mellitus, emphysema,sigmoid colon cancer status post resection currently getting chemotherapywe'll presented to the ED with a complaint of cough, wheezing  And chest tightness.  The patient received her chemotherapy on Monday was doing well except for getting  Chest congestion.  She denied any fevers or chills.  Her chest tightness started with congestion.  She came to the ED where EKG and cardiac enzymes drawn multiple times were negative for any ischemia.  At the time of his sick taking the patient is still having that feeling of tightness in the chest with wheezing and congestion.  She denies any nausea or vomiting.  Denies abdominal pain diarrhea or constipation.    Hospital Course   Sola Gan was admitted on 10/25/2017.  The following problems were addressed during her hospitalization:      Chest pain,non cardiac: Patient presented with right mid/upper chest pain. On exam she does state is hurt worse with deep breath and worse with palpation. She felt however that carafate and PPI helped her symptoms as well and did have some upper epigastric tenderness. Acute MI was ruled out with normal EKG and negative serial troponins. Because of her PMH with risk factors for CVD she was scheduled  for a stress test that was done this morning and returned normal. She is discharged home with prescriptions for Protonix and Carafate and will follow-up with her PCP in 5-7 days to evaluate her response/need for further investigation/treatment .       COPD (chronic obstructive pulmonary disease) probable      Possible acute bronchitis: Patient reports slightly increased cough with worsened allergies over the last several weeks, she does have right posterior rare wheeze on auscultation. No fevers, hypoxia, severe cough. Possible source of her right chest pain. Advised on supportive self care.       Seasonal allergic rhinitis due to pollen: Continue Singulair.      Controlled type 2 diabetes mellitus without complication, without long-term current use of insulin (H): Continue metformin.        Vandana Barclay, CNP      Code Status   Full Code       Primary Care Physician   Favian Recinos      Discharge Disposition   Discharged to home  Condition at discharge: Stable    Consultations This Hospital Stay   None    Time Spent on this Encounter   I, Vandana Barclay, personally saw the patient today and spent less than or equal to 30 minutes discharging this patient.    Discharge Orders     NM Exercise stress test   The type of stress to be performed (pharmacologic or physical) will be at the discretion of the supervising physician as per department protocol.     Reason for your hospital stay   Right chest/upper abdominal pain     Follow-up and recommended labs and tests    Follow up with primary care provider, Favian Recinos, within 7 days for hospital follow- up.  No follow up labs or test are needed.     Activity   Your activity upon discharge: activity as tolerated     When to contact your care team   Call your primary doctor if you have any of the following: shortness of breath, sudden severe chest pain, fever, any new or worsening symptoms.     Full Code     Diet   Follow this diet upon discharge: Orders  Placed This Encounter     Moderate Consistent CHO Diet       Discharge Medications   Current Discharge Medication List      START taking these medications    Details   pantoprazole (PROTONIX) 40 MG EC tablet Take 1 tablet (40 mg) by mouth 2 times daily (before meals)  Qty: 60 tablet, Refills: 1    Associated Diagnoses: Abdominal pain, epigastric      sucralfate (CARAFATE) 1 GM/10ML suspension Take 10 mLs (1 g) by mouth 4 times daily (before meals and nightly) for 14 days  Qty: 560 mL, Refills: 0    Associated Diagnoses: Abdominal pain, epigastric         CONTINUE these medications which have NOT CHANGED    Details   Famotidine-Ca Carb-Mag Hydrox (PEPCID COMPLETE PO) Take 1 tablet by mouth 2 times daily as needed (heartburn)      DiphenhydrAMINE HCl (BENADRYL PO) Take 25 mg by mouth nightly as needed for sleep      !! METFORMIN HCL PO Take 500 mg by mouth daily (with breakfast)      !! METFORMIN HCL PO Take 1,000 mg by mouth daily (with dinner)      ePHEDrine-GuaiFENesin (PRIMATENE ASTHMA) 12.5-200 MG TABS Take 1 tablet by mouth At Bedtime      Multiple Vitamins-Minerals (MULTIVITAMIN GUMMIES ADULT PO) Take 2 chew tab by mouth daily      FLUOROURACIL IV Inject into the vein every 14 days Had pump removed 10/25/17      LEUCOVORIN CALCIUM IV Inject into the vein every 14 days      oxaliplatin 85 mg/m2 Inject into the vein every 14 days      dexamethasone 0.6 mg/kg/day Inject 12 mg into the vein every 14 days With chemo      Palonosetron HCl (ALOXI IV) Inject 0.25 mg into the vein every 14 days With chemo      FLUoxetine (PROZAC) 20 MG capsule TAKE 1 CAPSULE(20 MG) BY MOUTH DAILY  Qty: 90 capsule, Refills: 0    Associated Diagnoses: Other depression      ondansetron (ZOFRAN) 4 MG tablet Take 1 tablet (4 mg) by mouth every 6 hours as needed for nausea  Qty: 10 tablet, Refills: 0    Associated Diagnoses: Cancer of sigmoid colon (H)      cetirizine (ZYRTEC ALLERGY) 10 MG tablet Take 10 mg by mouth daily      COMBIVENT  RESPIMAT  MCG/ACT inhaler SEE NOTES  Qty: 4 g, Refills: 1    Comments: INHALE 1 PUFF INTO THE LUNGS FOUR TIMES DAILY AS NEEDED FOR SHORTNESS OF BREATH OR DIFFICULT BREATHING OR WHEEZING. NOT TO EXCEED 6 DOSES PER DAY  Associated Diagnoses: Other emphysema (H)      lisinopril (PRINIVIL/ZESTRIL) 10 MG tablet Take 1 tablet (10 mg) by mouth daily  Qty: 30 tablet, Refills: 8    Associated Diagnoses: Benign essential hypertension      aspirin 81 MG EC tablet Take 1 tablet (81 mg) by mouth daily  Qty: 90 tablet, Refills: 3    Associated Diagnoses: Controlled type 2 diabetes mellitus without complication, without long-term current use of insulin (H)      atorvastatin (LIPITOR) 40 MG tablet Take 1 tablet (40 mg) by mouth daily  Qty: 90 tablet, Refills: 3    Associated Diagnoses: Controlled type 2 diabetes mellitus without complication, without long-term current use of insulin (H)      blood glucose monitoring (ONE TOUCH VERIO IQ) test strip Use to test blood sugar 2 times daily.  Qty: 100 each, Refills: 10    Associated Diagnoses: Type 2 diabetes mellitus with hyperglycemia, without long-term current use of insulin (H)      Ibuprofen (ADVIL PO) Take 600-800 mg by mouth every 8 hours as needed for moderate pain       albuterol (ALBUTEROL) 108 (90 BASE) MCG/ACT Inhaler Inhale 2 puffs into the lungs every 4 hours as needed for shortness of breath / dyspnea  Qty: 1 Inhaler, Refills: 0       !! - Potential duplicate medications found. Please discuss with provider.      STOP taking these medications       Prochlorperazine Maleate (COMPAZINE PO) Comments:   Reason for Stopping:         UNABLE TO FIND Comments:   Reason for Stopping:         Famotidine (PEPCID PO) Comments:   Reason for Stopping:             Allergies   Allergies   Allergen Reactions     Acyclovir Nausea     Sulfa Drugs Hives     Data   Most Recent 3 CBC's:  Recent Labs   Lab Test  10/26/17   0512  10/25/17   1121  06/27/17   1237   WBC  2.5*  2.5*  7.2   HGB   13.2  14.4  16.2*   MCV  88  89  87   PLT  153  158  283      Most Recent 3 BMP's:  Recent Labs   Lab Test  10/26/17   0520  10/25/17   1121  06/27/17   1237  04/13/17   1049   NA   --   137  136  138   POTASSIUM  4.2  3.3*  3.7  4.0   CHLORIDE   --   102  104  104   CO2   --   26  28  23   BUN   --   16  12  14   CR   --   0.70  0.69  0.72   ANIONGAP   --   9  4  11   SHUN   --   9.1  9.1  9.1   GLC   --   122*  159*  194*     Most Recent 2 LFT's:  Recent Labs   Lab Test  10/25/17   1121  06/27/17   1237   AST  27  20   ALT  46  35   ALKPHOS  87  99   BILITOTAL  0.4  0.5     Most Recent INR's and Anticoagulation Dosing History:  Anticoagulation Dose History     Recent Dosing and Labs Latest Ref Rng & Units 6/27/2017    INR 0.80 - 1.20 1.03        Most Recent 3 Troponin's:  Recent Labs   Lab Test  10/25/17   1416  10/25/17   1121   TROPI  <0.015  <0.015     Most Recent Cholesterol Panel:  Recent Labs   Lab Test  03/28/17   1113   CHOL  173   LDL  104*   HDL  40*   TRIG  147     Most Recent 6 Bacteria Isolates From Any Culture (See EPIC Reports for Culture Details):  Recent Labs   Lab Test  03/30/14   1950  03/30/14   1945  03/30/14   1938  03/30/14   1930   CULT  No beta hemolytic Streptococcus Group A isolated  Heavy growth Haemophilus influenzae Beta lactamase negative Susceptibility  testing not routinely done*  No growth after 6 days  No growth after 6 days     Most Recent TSH, T4 and A1c Labs:  Recent Labs   Lab Test  10/26/17   0520   03/28/17   1113   TSH   --    --   2.83   A1C  6.5*   < >   --     < > = values in this interval not displayed.     Results for orders placed or performed during the hospital encounter of 10/25/17   XR Chest 2 Views    Narrative    PROCEDURE:  XR CHEST 2 VW    HISTORY: chest tightness, .    COMPARISON:  6/30/2017    FINDINGS: A left chest port is in appropriate position.  The cardiomediastinal contours are normal.  The trachea is midline.  No focal consolidation, effusion or  pneumothorax.    No suspicious osseous lesion or subdiaphragmatic free air.      Impression    IMPRESSION:      No acute cardiopulmonary process.      SUJEY STOREY MD

## 2017-10-26 NOTE — PROCEDURES
Sola Gan 66 year old    Returned from Nuclear Medicine  Exam performed: Nuclear Medicine Cardiac Stress Test  Tolerated Procedure: well  May resume previous diet: Yes  Pushed to radiologist reading service? Not applicable  Time returned to unit: 10:45  Handoff Method: Report given to RN   Was patient held? NO  If yes, by whom? NA  10/26/2017 10:51 AM  Susan Torres

## 2017-10-27 ENCOUNTER — TELEPHONE (OUTPATIENT)
Dept: CASE MANAGEMENT | Facility: HOSPITAL | Age: 67
End: 2017-10-27

## 2017-10-27 NOTE — TELEPHONE ENCOUNTER
Sola Gan, was discharged to home on  10/26/17  from [facility]. I spoke today with Sola  regarding her  discharge.   She  indicates she  receive(d) sufficient information upon discharge. Medications were reviewed in full on discharge, including: Medications to be started; medications to be stopped; medications to be continued from preadmission and any side effects. Prescriptions sent to preferred pharmacy  to be filled. Medications are being taken as prescribed.   She  indicates she has  an appointment scheduled for Nov 2  and has  transportation available. She was  able to confirm her appointment time and has  it written down.    She  did not have any questions regarding discharge instructions or condition.  Per their request, the following employee (s) can be recognized for their outstanding services delivered:  Care was excellent!  Suggestions to improve service: nothing indicated  She  was informed she may receive a survey in the mail from the hospital. Asked if she  would kindly complete the survey in order for [facility] to know if services fully met patient needs.

## 2017-11-02 ENCOUNTER — OFFICE VISIT (OUTPATIENT)
Dept: FAMILY MEDICINE | Facility: OTHER | Age: 67
End: 2017-11-02
Attending: FAMILY MEDICINE
Payer: MEDICAID

## 2017-11-02 VITALS
HEART RATE: 97 BPM | SYSTOLIC BLOOD PRESSURE: 112 MMHG | HEIGHT: 61 IN | RESPIRATION RATE: 16 BRPM | WEIGHT: 203.4 LBS | TEMPERATURE: 98.6 F | BODY MASS INDEX: 38.4 KG/M2 | OXYGEN SATURATION: 98 % | DIASTOLIC BLOOD PRESSURE: 66 MMHG

## 2017-11-02 DIAGNOSIS — Z71.6 TOBACCO ABUSE COUNSELING: ICD-10-CM

## 2017-11-02 DIAGNOSIS — E87.6 HYPOKALEMIA: ICD-10-CM

## 2017-11-02 DIAGNOSIS — K29.70 GASTRITIS, PRESENCE OF BLEEDING UNSPECIFIED, UNSPECIFIED CHRONICITY, UNSPECIFIED GASTRITIS TYPE: Primary | ICD-10-CM

## 2017-11-02 DIAGNOSIS — Z12.31 ENCOUNTER FOR SCREENING MAMMOGRAM FOR BREAST CANCER: ICD-10-CM

## 2017-11-02 DIAGNOSIS — Z72.0 TOBACCO ABUSE: ICD-10-CM

## 2017-11-02 PROCEDURE — 99213 OFFICE O/P EST LOW 20 MIN: CPT | Performed by: FAMILY MEDICINE

## 2017-11-02 RX ORDER — NICOTINE 14MG/24HR
1 PATCH, TRANSDERMAL 24 HOURS TRANSDERMAL 2 TIMES DAILY
Qty: 180 CAPSULE | Refills: 3 | Status: SHIPPED | OUTPATIENT
Start: 2017-11-02 | End: 2018-10-10

## 2017-11-02 ASSESSMENT — ANXIETY QUESTIONNAIRES
2. NOT BEING ABLE TO STOP OR CONTROL WORRYING: SEVERAL DAYS
GAD7 TOTAL SCORE: 4
IF YOU CHECKED OFF ANY PROBLEMS ON THIS QUESTIONNAIRE, HOW DIFFICULT HAVE THESE PROBLEMS MADE IT FOR YOU TO DO YOUR WORK, TAKE CARE OF THINGS AT HOME, OR GET ALONG WITH OTHER PEOPLE: NOT DIFFICULT AT ALL
6. BECOMING EASILY ANNOYED OR IRRITABLE: SEVERAL DAYS
1. FEELING NERVOUS, ANXIOUS, OR ON EDGE: SEVERAL DAYS
3. WORRYING TOO MUCH ABOUT DIFFERENT THINGS: NOT AT ALL
5. BEING SO RESTLESS THAT IT IS HARD TO SIT STILL: NOT AT ALL
7. FEELING AFRAID AS IF SOMETHING AWFUL MIGHT HAPPEN: NOT AT ALL

## 2017-11-02 ASSESSMENT — PAIN SCALES - GENERAL
PAINLEVEL: NO PAIN (0)
PAINLEVEL: NO PAIN (0)

## 2017-11-02 ASSESSMENT — PATIENT HEALTH QUESTIONNAIRE - PHQ9
5. POOR APPETITE OR OVEREATING: SEVERAL DAYS
SUM OF ALL RESPONSES TO PHQ QUESTIONS 1-9: 2

## 2017-11-02 NOTE — PATIENT INSTRUCTIONS

## 2017-11-02 NOTE — NURSING NOTE
"Chief Complaint   Patient presents with     Hospital F/U     Pt is in for a FU after hospitalization for CP, abd pain, Hypokalemia and chemotherapy induced Neutropenia on 10/25/17.       Initial /66 (BP Location: Left arm, Patient Position: Chair, Cuff Size: Adult Large)  Pulse 97  Temp 98.6  F (37  C) (Tympanic)  Resp 16  Ht 5' 1\" (1.549 m)  Wt 203 lb 6.4 oz (92.3 kg)  SpO2 98%  BMI 38.43 kg/m2 Estimated body mass index is 38.43 kg/(m^2) as calculated from the following:    Height as of this encounter: 5' 1\" (1.549 m).    Weight as of this encounter: 203 lb 6.4 oz (92.3 kg).  Medication Reconciliation: complete   Julia Madden    "

## 2017-11-02 NOTE — MR AVS SNAPSHOT
After Visit Summary   11/2/2017    Sola Gan    MRN: [id number]           Patient Information     Date Of Birth          1950        Visit Information        Provider Department      11/2/2017 11:00 AM Favian Recinos MD [facility]        Today's Diagnoses     Gastritis, presence of bleeding unspecified, unspecified chronicity, unspecified gastritis type    -  1    Tobacco abuse        Tobacco abuse counseling        Encounter for screening mammogram for breast cancer        Hypokalemia          Care Instructions      HOW TO QUIT SMOKING  Smoking is one of the hardest habits to break. About half of all those who have ever smoked have been able to quit, and most of those (about 70%) who still smoke want to quit. Here are some of the best ways to stop smoking.     KEEP TRYING:  It takes most smokers about 8 tries before they are finally able to fully quit. So, the more often you try and fail, the better your chance of quitting the next time! So, don't give up!    GO COLD TURKEY:  Most ex-smokers quit cold turkey. Trying to cut back gradually doesn't seem to work as well, perhaps because it continues the smoking habit. Also, it is possible to fool yourself by inhaling more while smoking fewer cigarettes. This results in the same amount of nicotine in your body!    GET SUPPORT:  Support programs can make an important difference, especially for the heavy smoker. These groups offer lectures, methods to change your behavior and peer support. Call the free national Quitline for more information. 800-QUIT-NOW (831-383-6716). Low-cost or free programs are offered by many hospitals, local chapters of the American Lung Association (748-538-5545) and the American Cancer Society (013-277-7745). Support at home is important too. Non-smokers can help by offering praise and encouragement. If the smoker fails to quit, encourage them to try again!    OVER-THE-COUNTER MEDICINES:  For those  who can't quit on their own, Nicotine Replacement Therapy (NRT) may make quitting much easier. Certain aids such as the nicotine patch, gum and lozenge are available without a prescription. However, it is best to use these under the guidance of your doctor. The skin patch provides a steady supply of nicotine to the body. Nicotine gum and lozenge gives temporary bursts of low levels of nicotine. Both methods take the edge off the craving for cigarettes. WARNING: If you feel symptoms of nicotine overdose, such as nausea, vomiting, dizziness, weakness, or fast heartbeat, stop using these and see your doctor.    PRESCRIPTION MEDICINES:  After evaluating your smoking patterns and prior attempts at quitting, your doctor may offer a prescription medicine such as bupropion (Zyban, Wellbutrin), varenicline (Chantix, Champix), a niocotine inhaler or nasal spray. Each has its unique advantage and side effects which your doctor can review with you.    HEALTH BENEFITS OF QUITTING:  The benefits of quitting start right away and keep improving the longer you go without smokin minutes: blood pressure and pulse return to normal  8 hours: oxygen levels return to normal  2 days: ability to smell and taste begins to improve as damaged nerves start to regrow  2-3 weeks: circulation and lung function improves  1-9 months: decreased cough, congestion and shortness of breath; less tired  1 year: risk of heart attack decreases by half  5 years: risk of lung cancer decreases by half; risk of stroke becomes the same as a non-smoker  For information about how to quit smoking, visit the following links:  National Cancer [place] ,   Clearing the Air, Quit Smoking Today   - an online booklet. http://www.smokefree.gov/pubs/clearing_the_air.pdf  Smokefree.gov http://smokefree.gov/  QuitNet http://www.quitnet.com/    9993-6631 [employer], [address] [place] [address], [place] [address]. All rights reserved. This information is not intended as  a substitute for professional medical care. Always follow your healthcare professional's instructions.    The Benefits of Living Smoke Free  What do you want to gain from quitting? Check off some reasons to quit.  Health Benefits  ___ Reduce my risk of lung cancer, heart disease, chronic lung disease  ___ Have fewer wrinkles and softer skin  ___ Improve my sense of taste and smell  ___ For pregnant women--reduce the risk of having a miscarriage, stillbirth, premature birth, or low-birth-weight baby  Personal Benefits  ___ Feel more in control of my life  ___ Have better-smelling hair, breath, clothes, home, and car  ___ Save time by not having to take smoke breaks, buy cigarettes, or hunt for a light  ___ Have whiter teeth  Family Benefits  ___ Reduce my children s respiratory tract infections  ___ Set a good example for my children  ___ Reduce my family s cancer risk  Financial Benefits  ___ Save hundreds of dollars each year that would be spent on cigarettes  ___ Save money on medical bills  ___ Save on life, health, and car insurance premiums    Those Dollars Add Up!  Cigarettes are expensive, and getting more expensive all the time. Do you realize how much money you are spending on cigarettes per year? What is the average amount you spend on a pack of cigarettes? What is the average number of packs that you smoke per day? Using your answers to these questions, fill in this formula to help you find out:  ($ _____ per pack) ×  ( _____ number of packs per day) × (365 days) =  $ _____ yearly cost of smoking  Besides tobacco, there are other costs, including extra cleaning bills and replacement costs for clothing and furniture; medical expenses for smoking-related illnesses; and higher health, life, and car insurance premiums.    Cigars and Pipes Count Too!  Cigars and pipes are also dangerous. So are smokeless (chewing) tobacco and snuff. All of these products contain nicotine, a highly addictive substance that has  harmful effects on your body. Quitting smoking means giving up all tobacco products.      0918-7710 [facility], [address], [place] [address]. All rights reserved. This information is not intended as a substitute for professional medical care. Always follow your healthcare professional's instructions.          Follow-ups after your visit        Future tests that were ordered for you today     Open Future Orders        Priority Expected Expires Ordered    MA SCREENING DIGITAL BILATERAL ([place]) Routine  11/2/2018 11/2/2017            Who to contact     If you have questions or need follow up information about today's clinic visit or your schedule please contact [facility] directly at [phone].  Normal or non-critical lab and imaging results will be communicated to you by MyChart, letter or phone within 4 business days after the clinic has received the results. If you do not hear from us within 7 days, please contact the clinic through [employer] or phone. If you have a critical or abnormal lab result, we will notify you by phone as soon as possible.  Submit refill requests through [employer] or call your pharmacy and they will forward the refill request to us. Please allow 3 business days for your refill to be completed.          Additional Information About Your Visit        MyChart Information     [employer] gives you secure access to your electronic health record. If you see a primary care provider, you can also send messages to your care team and make appointments. If you have questions, please call your primary care clinic.  If you do not have a primary care provider, please call [phone] and they will assist you.        Care EveryWhere ID     This is your Care EveryWhere ID. This could be used by other organizations to access your [place] medical records  [id number]        Your Vitals Were     Pulse Temperature Respirations Height Pulse Oximetry BMI (Body Mass Index)    97  "98.6  F (37  C) (Tympanic) 16 5' 1\" (1.549 m) 98% 38.43 kg/m2       Blood Pressure from Last 3 Encounters:   11/02/17 112/66   10/26/17 127/78   09/08/17 123/83    Weight from Last 3 Encounters:   11/02/17 203 lb 6.4 oz (92.3 kg)   09/08/17 217 lb 9.6 oz (98.7 kg)   09/05/17 218 lb (98.9 kg)              We Performed the Following     Tobacco Cessation - for Health Maintenance          Today's Medication Changes          These changes are accurate as of: 11/2/17 12:39 PM.  If you have any questions, ask your nurse or doctor.               Start taking these medicines.        Dose/Directions    Probiotic 250 MG Caps   Used for:  Gastritis, presence of bleeding unspecified, unspecified chronicity, unspecified gastritis type   Started by:  Favian Recinos MD        Dose:  1 capsule   Take 1 capsule by mouth 2 times daily   Quantity:  180 capsule   Refills:  3         These medicines have changed or have updated prescriptions.        Dose/Directions    atorvastatin 40 MG tablet   Commonly known as:  LIPITOR   This may have changed:  when to take this   Used for:  Controlled type 2 diabetes mellitus without complication, without long-term current use of insulin (H)        Dose:  40 mg   Take 1 tablet (40 mg) by mouth daily   Quantity:  90 tablet   Refills:  3            Where to get your medicines      [facility] [address] [place] [address] - [address] AT [facility] [address]  [address], [facility] [address]     Phone:  [phone]     Probiotic 250 MG Caps                Primary Care Provider Office Phone # Fax #    Favian Recinos [phone]       [facility] [address]  [facility] [address]        Equal Access to Services     LUNA WAKTINS AH: Allie calderón Sorhonda, waduglasda babakqyaniv, qaybta kaalmada neeru, madai gann. So [facility] [phone].    ATENCIÓN: Si habla español, tiene a sanchez disposición " servicios gratuitos de asistencia lingüística. Ritesh parada [phone].    We comply with applicable federal civil rights laws and [place] laws. We do not discriminate on the basis of race, color, national origin, age, disability, sex, sexual orientation, or gender identity.            Thank you!     Thank you for choosing [facility]  for your care. Our goal is always to provide you with excellent care. Hearing back from our patients is one way we can continue to improve our services. Please take a few minutes to complete the written survey that you may receive in the mail after your visit with us. Thank you!             Your Updated Medication List - Protect others around you: Learn how to safely use, store and throw away your medicines at www.disposemymeds.org.          This list is accurate as of: 11/2/17 12:39 PM.  Always use your most recent med list.                   Brand Name Dispense Instructions for use Diagnosis    ADVIL PO      Take 600-800 mg by mouth every 8 hours as needed for moderate pain        albuterol 108 (90 BASE) MCG/ACT Inhaler    PROAIR HFA    1 Inhaler    Inhale 2 puffs into the lungs every 4 hours as needed for shortness of breath / dyspnea        ALOXI IV      Inject 0.25 mg into the vein every 14 days With chemo        aspirin 81 MG EC tablet     90 tablet    Take 1 tablet (81 mg) by mouth daily    Controlled type 2 diabetes mellitus without complication, without long-term current use of insulin (H)       atorvastatin 40 MG tablet    LIPITOR    90 tablet    Take 1 tablet (40 mg) by mouth daily    Controlled type 2 diabetes mellitus without complication, without long-term current use of insulin (H)       BENADRYL PO      Take 25 mg by mouth nightly as needed for sleep        blood glucose monitoring test strip    ONE TOUCH VERIO IQ    100 each    Use to test blood sugar 2 times daily.    Type 2 diabetes mellitus with hyperglycemia, without long-term current use of insulin  (H)       COMBIVENT RESPIMAT  MCG/ACT inhaler   Generic drug:  Ipratropium-Albuterol     4 g    SEE NOTES    Other emphysema (H)       dexamethasone 0.6 mg/kg/day      Inject 12 mg into the vein every 14 days With chemo        FLUOROURACIL IV      Inject into the vein every 14 days Had pump removed 10/25/17        FLUoxetine 20 MG capsule    PROzac    90 capsule    TAKE 1 CAPSULE(20 MG) BY MOUTH DAILY    Other depression       LEUCOVORIN CALCIUM IV      Inject into the vein every 14 days        lisinopril 10 MG tablet    PRINIVIL/ZESTRIL    30 tablet    Take 1 tablet (10 mg) by mouth daily    Benign essential hypertension       * METFORMIN HCL PO      Take 500 mg by mouth daily (with breakfast)        * METFORMIN HCL PO      Take 1,000 mg by mouth daily (with dinner)        MULTIVITAMIN GUMMIES ADULT PO      Take 2 chew tab by mouth daily        ondansetron 4 MG tablet    ZOFRAN    10 tablet    Take 1 tablet (4 mg) by mouth every 6 hours as needed for nausea    Cancer of sigmoid colon (H)       oxaliplatin 85 mg/m2      Inject into the vein every 14 days        pantoprazole 40 MG EC tablet    PROTONIX    60 tablet    Take 1 tablet (40 mg) by mouth 2 times daily (before meals)    Abdominal pain, epigastric       PRIMATENE ASTHMA 12.5-200 MG Tabs   Generic drug:  ePHEDrine-GuaiFENesin      Take 1 tablet by mouth At Bedtime        Probiotic 250 MG Caps     180 capsule    Take 1 capsule by mouth 2 times daily    Gastritis, presence of bleeding unspecified, unspecified chronicity, unspecified gastritis type       sucralfate 1 GM/10ML suspension    CARAFATE    560 mL    Take 10 mLs (1 g) by mouth 4 times daily (before meals and nightly) for 14 days    Abdominal pain, epigastric       ZYRTEC ALLERGY 10 MG tablet   Generic drug:  cetirizine      Take 10 mg by mouth daily        * Notice:  This list has 2 medication(s) that are the same as other medications prescribed for you. Read the directions carefully, and ask  your doctor or other care provider to review them with you.

## 2017-11-02 NOTE — PROGRESS NOTES
Sola Gan    November 2, 2017    Chief Complaint   Patient presents with     Hospital F/U     Pt is in for a FU after hospitalization for CP, abd pain, Hypokalemia and chemotherapy induced Neutropenia on 10/25/17.       SUBJECTIVE:  Here for f/u.  Had some chest pain which was likely GI.  Heart checked out ok without issue.  Sx have resolved.  Would like a probiotic.  Reviewed meds.  Had a low K which resolved as well.      Past Medical History:   Diagnosis Date     COPD (chronic obstructive pulmonary disease) (H)      Diabetes (H)      H/O sleep apnea     tonsils, adnoids, and uvula removed     Uncomplicated asthma        Past Surgical History:   Procedure Laterality Date     APPENDECTOMY       CHOLECYSTECTOMY       COLONOSCOPY N/A 6/21/2017    Procedure: COLONOSCOPY;  COLONOSCOPY WITH POLYPECTOMY, BIOPSY, ENDOSCOPIC MARKER TATOOING;  Surgeon: Rigo Coreas DO;  Location: [facility] OR     ENT SURGERY      for MORA     GYN SURGERY       HERNIA REPAIR       INSERT PORT VASCULAR ACCESS N/A 9/8/2017    Procedure: INSERT PORT VASCULAR ACCESS;  PORT-A-CATH PLACEMENT LEFT INTERNAL JUGULAR;  Surgeon: Awais Ding MD;  Location: [facility] OR       Current Outpatient Prescriptions   Medication Sig Dispense Refill     Saccharomyces boulardii (PROBIOTIC) 250 MG CAPS Take 1 capsule by mouth 2 times daily 180 capsule 3     pantoprazole (PROTONIX) 40 MG EC tablet Take 1 tablet (40 mg) by mouth 2 times daily (before meals) 60 tablet 1     sucralfate (CARAFATE) 1 GM/10ML suspension Take 10 mLs (1 g) by mouth 4 times daily (before meals and nightly) for 14 days 560 mL 0     DiphenhydrAMINE HCl (BENADRYL PO) Take 25 mg by mouth nightly as needed for sleep       METFORMIN HCL PO Take 500 mg by mouth daily (with breakfast)       METFORMIN HCL PO Take 1,000 mg by mouth daily (with dinner)       ePHEDrine-GuaiFENesin (PRIMATENE ASTHMA) 12.5-200 MG TABS Take 1 tablet by mouth At Bedtime       Multiple Vitamins-Minerals (MULTIVITAMIN  GUMMIES ADULT PO) Take 2 chew tab by mouth daily       FLUOROURACIL IV Inject into the vein every 14 days Had pump removed 10/25/17       LEUCOVORIN CALCIUM IV Inject into the vein every 14 days       oxaliplatin 85 mg/m2 Inject into the vein every 14 days       dexamethasone 0.6 mg/kg/day Inject 12 mg into the vein every 14 days With chemo       Palonosetron HCl (ALOXI IV) Inject 0.25 mg into the vein every 14 days With chemo       FLUoxetine (PROZAC) 20 MG capsule TAKE 1 CAPSULE(20 MG) BY MOUTH DAILY 90 capsule 0     ondansetron (ZOFRAN) 4 MG tablet Take 1 tablet (4 mg) by mouth every 6 hours as needed for nausea 10 tablet 0     cetirizine (ZYRTEC ALLERGY) 10 MG tablet Take 10 mg by mouth daily       COMBIVENT RESPIMAT  MCG/ACT inhaler SEE NOTES 4 g 1     lisinopril (PRINIVIL/ZESTRIL) 10 MG tablet Take 1 tablet (10 mg) by mouth daily 30 tablet 8     aspirin 81 MG EC tablet Take 1 tablet (81 mg) by mouth daily 90 tablet 3     atorvastatin (LIPITOR) 40 MG tablet Take 1 tablet (40 mg) by mouth daily (Patient taking differently: Take 40 mg by mouth At Bedtime ) 90 tablet 3     blood glucose monitoring (ONE TOUCH VERIO IQ) test strip Use to test blood sugar 2 times daily. 100 each 10     Ibuprofen (ADVIL PO) Take 600-800 mg by mouth every 8 hours as needed for moderate pain        albuterol (ALBUTEROL) 108 (90 BASE) MCG/ACT Inhaler Inhale 2 puffs into the lungs every 4 hours as needed for shortness of breath / dyspnea 1 Inhaler 0       Allergies   Allergen Reactions     Acyclovir Nausea     Sulfa Drugs Hives       Family History   Problem Relation Age of Onset     Other Cancer Mother      ovarian      Breast Cancer Mother      Bronchitis Father      Peripheral Neuropathy Father      Hypertension Father        Social History     Social History     Marital status: Single     Spouse name: N/A     Number of children: N/A     Years of education: N/A     Occupational History     Not on file.     Social History Main  Topics     Smoking status: Current Every Day Smoker     Packs/day: 0.75     Years: 20.00     Smokeless tobacco: Never Used     Alcohol use No     Drug use: No     Sexual activity: Not on file     Other Topics Concern     Not on file     Social History Narrative       5 point ROS negative except as noted above in HPI, including Gen., Resp., CV, GI &  system review.     OBJECTIVE:  B/P: 112/66, T: 98.6, P: 97, R: 16    GENERAL APPEARANCE: Alert, no acute distress  CV: regular rate and rhythm, no murmur, rub or gallop  RESP: lungs clear to auscultation bilaterally  ABDOMEN: normal bowel sounds, soft, nontender, no hepatosplenomegaly or other masses  SKIN: no suspicious lesions or rashes to visualized skin  NEURO: Alert, oriented x 3, speech and mentation normal    ASSESSMENT and PLAN:  (K29.70) Gastritis, presence of bleeding unspecified, unspecified chronicity, unspecified gastritis type  (primary encounter diagnosis)  Comment: improved.   Plan: Saccharomyces boulardii (PROBIOTIC) 250 MG CAPS        She would like to try a probiotic.  Sent.  Continue carafate for the next 2 weeks and protonix while on chemo.  Sx resolved.     (Z72.0) Tobacco abuse  Comment: ongoing  Plan: Tobacco Cessation - for Health Maintenance        Advise cessationl     (Z71.6) Tobacco abuse counseling  Comment: as above.   Plan: as above.     (Z12.31) Encounter for screening mammogram for breast cancer  Comment: discussed.   Plan: MA SCREENING DIGITAL BILATERAL ([place])        Getting the mammo set up.     (E87.6) Hypokalemia  Comment: resolved.   Plan: f/u routine.

## 2017-11-03 ASSESSMENT — ANXIETY QUESTIONNAIRES: GAD7 TOTAL SCORE: 4

## 2017-11-16 ENCOUNTER — TELEPHONE (OUTPATIENT)
Dept: EDUCATION SERVICES | Facility: HOSPITAL | Age: 67
End: 2017-11-16

## 2017-11-16 DIAGNOSIS — E11.65 TYPE 2 DIABETES MELLITUS WITH HYPERGLYCEMIA, WITHOUT LONG-TERM CURRENT USE OF INSULIN (H): Primary | ICD-10-CM

## 2017-11-16 NOTE — TELEPHONE ENCOUNTER
Sola needs to speak to Madison having some confusion over a prescription and she can't check her blood sugars until it is done.

## 2017-11-17 RX ORDER — BLOOD-GLUCOSE METER
EACH MISCELLANEOUS
Qty: 1 KIT | Refills: 0 | Status: SHIPPED | OUTPATIENT
Start: 2017-11-17 | End: 2017-11-17

## 2017-11-17 RX ORDER — LANCETS
EACH MISCELLANEOUS
Qty: 102 EACH | Refills: 10 | Status: SHIPPED | OUTPATIENT
Start: 2017-11-17

## 2017-11-17 RX ORDER — BLOOD-GLUCOSE METER
EACH MISCELLANEOUS
Qty: 1 KIT | Refills: 0 | Status: SHIPPED | OUTPATIENT
Start: 2017-11-17

## 2017-11-17 NOTE — TELEPHONE ENCOUNTER
Called [facility] to confirm which BG meter is now covered. Pharmacy staff states AccuChek Elda. Called Sola on 11/16/17 and LM. Sola called back today. Discussed that AccuChek is covered. She had recently changed to [employer]. Sola states that she will be changing to Medicare and us aware that they may cover even another meter. Will work under current insurance for now and order AccuChek meter and supplies for Sola.

## 2017-12-08 ENCOUNTER — TELEPHONE (OUTPATIENT)
Dept: EDUCATION SERVICES | Facility: HOSPITAL | Age: 67
End: 2017-12-08

## 2017-12-08 NOTE — TELEPHONE ENCOUNTER
Patient called today and left voice mail that she needed to make apt with Madison but she would like to talk to Madison first    Madison please call patient at [phone]    Thank you

## 2017-12-19 ENCOUNTER — HOSPITAL ENCOUNTER (OUTPATIENT)
Dept: EDUCATION SERVICES | Facility: HOSPITAL | Age: 67
Discharge: HOME | End: 2017-12-19
Attending: NURSE PRACTITIONER | Admitting: NURSE PRACTITIONER
Payer: MEDICARE

## 2017-12-19 VITALS
DIASTOLIC BLOOD PRESSURE: 78 MMHG | OXYGEN SATURATION: 95 % | SYSTOLIC BLOOD PRESSURE: 128 MMHG | WEIGHT: 201.7 LBS | HEART RATE: 89 BPM | BODY MASS INDEX: 38.08 KG/M2 | HEIGHT: 61 IN

## 2017-12-19 PROCEDURE — 97803 MED NUTRITION INDIV SUBSEQ: CPT | Performed by: DIETITIAN, REGISTERED

## 2017-12-19 ASSESSMENT — PAIN SCALES - GENERAL: PAINLEVEL: NO PAIN (0)

## 2017-12-19 NOTE — NURSING NOTE
"Chief Complaint   Patient presents with     Diabetes     return       Initial /78  Pulse 89  Ht 1.549 m (5' 1\")  Wt 91.5 kg (201 lb 11.2 oz)  SpO2 95%  BMI 38.11 kg/m2 Estimated body mass index is 38.11 kg/(m^2) as calculated from the following:    Height as of this encounter: 1.549 m (5' 1\").    Weight as of this encounter: 91.5 kg (201 lb 11.2 oz).  Medication Reconciliation: complete   Tia Virgen      "

## 2017-12-19 NOTE — MR AVS SNAPSHOT
After Visit Summary   12/19/2017    Sola Gan    MRN: [id number]           Patient Information     Date Of Birth          1950        Visit Information        Provider Department      12/19/2017  1:30 PM Madison Zambrano RN; Cherelle Gaytan RD [facility]         Follow-ups after your visit        Your next 10 appointments already scheduled     Dec 27, 2017 10:45 AM CST   (Arrive by 10:30 AM)   Continuous Glucose Monitor Hookup with Hc Dm Nurse   [facility] ([facility] )    [address]  [facility] [id number] [phone]            Gómez 10, 2018 10:00 AM CST   (Arrive by 9:45 AM)   Continuous Glucose Monitor Evaluation with Lisy Radford NP   [facility] ([facility] )    [address]  [facility] [address] [phone]              Who to contact     If you have questions or need follow up information about today's clinic visit or your schedule please contact [facility] directly at [phone].  Normal or non-critical lab and imaging results will be communicated to you by GeoMehart, letter or phone within 4 business days after the clinic has received the results. If you do not hear from us within 7 days, please contact the clinic through Dryadt or phone. If you have a critical or abnormal lab result, we will notify you by phone as soon as possible.  Submit refill requests through Com[employer]-Chiamo or call your pharmacy and they will forward the refill request to us. Please allow 3 business days for your refill to be completed.          Additional Information About Your Visit        GeoMehart Information     [employer] gives you secure access to your electronic health record. If you see a primary care provider, you can also send messages to your care team and make appointments. If you have questions, please call your primary care clinic.  If you do not have a primary care provider, please call [phone]  "and they will assist you.        Care EveryWhere ID     This is your Care EveryWhere ID. This could be used by other organizations to access your [facility] medical records  [id number]        Your Vitals Were     Pulse Height Pulse Oximetry BMI (Body Mass Index)          89 5' 1\" (1.549 m) 95% 38.11 kg/m2         Blood Pressure from Last 3 Encounters:   12/19/17 128/78   11/02/17 112/66   10/26/17 127/78    Weight from Last 3 Encounters:   12/19/17 201 lb 11.2 oz (91.5 kg)   11/02/17 203 lb 6.4 oz (92.3 kg)   09/08/17 217 lb 9.6 oz (98.7 kg)              Today, you had the following     No orders found for display       Primary Care Provider Office Phone # Fax #    Favian Recinos [phone]       [facility] [address] [facility] [address]  [facility] [place] [address]        Equal Access to Services     [facility]: Hadii aad ku hadasho Soomaali, waaxda luqadaha, qaybta kaalmada adeegyada, waxay idiin hayaan adeeg boris keller . So [facility] [phone].    ATENCIÓN: Si nakitala español, tiene a sanchez disposición servicios gratuitos de asistencia lingüística. Llame al [phone].    We comply with applicable federal civil rights laws and [place] laws. We do not discriminate on the basis of race, color, national origin, age, disability, sex, sexual orientation, or gender identity.            Thank you!     Thank you for choosing [facility]  for your care. Our goal is always to provide you with excellent care. Hearing back from our patients is one way we can continue to improve our services. Please take a few minutes to complete the written survey that you may receive in the mail after your visit with us. Thank you!             Your Updated Medication List - Protect others around you: Learn how to safely use, store and throw away your medicines at www.disposemymeds.org.      TAKE these medications        Brand Name Dispense Instructions for use Diagnosis    blood glucose monitoring lancets     102 each "    Use to test blood sugar 2 times daily.    Type 2 diabetes mellitus with hyperglycemia, without long-term current use of insulin (H)       blood glucose monitoring meter device kit     1 kit    Use to test blood sugar 2 times daily .    Type 2 diabetes mellitus with hyperglycemia, without long-term current use of insulin (H)       * blood glucose monitoring test strip    ONETOUCH VERIO IQ    100 each    Use to test blood sugar 2 times daily.    Type 2 diabetes mellitus with hyperglycemia, without long-term current use of insulin (H)       * blood glucose monitoring test strip    ACCU-CHEK YOLANDA PLUS    100 each    Use to test blood sugar 2 times daily.    Type 2 diabetes mellitus with hyperglycemia, without long-term current use of insulin (H)       * Notice:  This list has 2 medication(s) that are the same as other medications prescribed for you. Read the directions carefully, and ask your doctor or other care provider to review them with you.      ASK your doctor about these medications        Brand Name Dispense Instructions for use Diagnosis    ADVIL PO      Take 600-800 mg by mouth every 8 hours as needed for moderate pain        albuterol 108 (90 BASE) MCG/ACT Inhaler    PROAIR HFA    1 Inhaler    Inhale 2 puffs into the lungs every 4 hours as needed for shortness of breath / dyspnea        aspirin 81 MG EC tablet     90 tablet    Take 1 tablet (81 mg) by mouth daily    Controlled type 2 diabetes mellitus without complication, without long-term current use of insulin (H)       atorvastatin 40 MG tablet    LIPITOR    90 tablet    Take 1 tablet (40 mg) by mouth daily    Controlled type 2 diabetes mellitus without complication, without long-term current use of insulin (H)       BENADRYL PO      Take 25 mg by mouth nightly as needed for sleep        COMBIVENT RESPIMAT  MCG/ACT inhaler   Generic drug:  Ipratropium-Albuterol     4 g    SEE NOTES    Other emphysema (H)       FLUoxetine 20 MG capsule    PROzac     90 capsule    TAKE 1 CAPSULE(20 MG) BY MOUTH DAILY    Other depression       lisinopril 10 MG tablet    PRINIVIL/ZESTRIL    30 tablet    Take 1 tablet (10 mg) by mouth daily    Benign essential hypertension       METFORMIN HCL PO      Take 500 mg by mouth 3 times daily (with meals)        MULTIVITAMIN GUMMIES ADULT PO      Take 2 chew tab by mouth daily        ondansetron 4 MG tablet    ZOFRAN    10 tablet    Take 1 tablet (4 mg) by mouth every 6 hours as needed for nausea    Cancer of sigmoid colon (H)       pantoprazole 40 MG EC tablet    PROTONIX    60 tablet    Take 1 tablet (40 mg) by mouth 2 times daily (before meals)    Abdominal pain, epigastric       PRIMATENE ASTHMA 12.5-200 MG Tabs   Generic drug:  ePHEDrine-GuaiFENesin      Take 1 tablet by mouth At Bedtime        Probiotic 250 MG Caps     180 capsule    Take 1 capsule by mouth 2 times daily    Gastritis, presence of bleeding unspecified, unspecified chronicity, unspecified gastritis type       ZYRTEC ALLERGY 10 MG tablet   Generic drug:  cetirizine      Take 10 mg by mouth daily

## 2017-12-19 NOTE — PROGRESS NOTES
"Pt is here today for diabetes follow up.      /78  Pulse 89  Ht 1.549 m (5' 1\")  Wt 91.5 kg (201 lb 11.2 oz)  SpO2 95%  BMI 38.11 kg/m2  Pt has lost 53.8# since our initial visit in March 2017.  Pt would like to continue to lose weight.      Current diabetes medications:  Metformin 1000 mg am/500 mg evening    Current glucose levels:  Pt forgot her meter today but states that glucose levels in the am are around 98 and 2 hours after supper are around 125.      Since last seeing patient she has undergone chemo for colon cancer dx.  She completed 3 months of chemo and no further is planned at this time.  Pt reports she is feeling well but her main concern today is what to eat for cancer prevention.  She is concerned about sugar intake and potential for elevated glucose/insulin levels.      Verbal diet recall obtained.  Pt eats three meals daily and has already been doing very well with limiting sugar as she was following carbohydrate counting meal plan.  Snacks consist of jerky, whole almonds, avocado/crackers, fruit, smoked salmon.  She does eat an occasional sweet dessert type item her daughter bakes.  She does eat some processed meat.  Reviewed benefits of increasing intake of fruits, vegetables in diet, limiting red meat, choosing lean meats, avoiding processed items.  Encouraged avoiding foods containing added sugar.  Discussed benefits of continued weight loss.     We discussed a CGM study so that pt can see which foods cause glucose elevations as she is concerned that with only 1-2x/day testing she will not know how foods effect her.  She is interested in this.      PLAN:  Follow meal planning suggestions.  Pt is very willing.  Pt scheduled for CGM study.      Follow up:  CGM study.     Total time spent with pt was 45 minutes.    "

## 2017-12-21 DIAGNOSIS — R10.13 ABDOMINAL PAIN, EPIGASTRIC: ICD-10-CM

## 2017-12-21 RX ORDER — PANTOPRAZOLE SODIUM 40 MG/1
40 TABLET, DELAYED RELEASE ORAL
Qty: 60 TABLET | Refills: 1 | Status: SHIPPED | OUTPATIENT
Start: 2017-12-21 | End: 2018-02-21

## 2017-12-21 NOTE — TELEPHONE ENCOUNTER
Sola needs a refill for her Protonix. It was originally ordered by hospitalist. Please see order for your signature in this encounter. Thanks!

## 2017-12-21 NOTE — PROGRESS NOTES
Sola is here for meal planning education. She wants to stay on top of her diabetes management and weight loss. Sat in for patient assessment. Sola needs a refill from Dr. Recinos. Will contact him.

## 2017-12-29 ENCOUNTER — ALLIED HEALTH/NURSE VISIT (OUTPATIENT)
Dept: WOUND CARE | Facility: OTHER | Age: 67
End: 2017-12-29
Attending: NURSE PRACTITIONER
Payer: MEDICARE

## 2017-12-29 DIAGNOSIS — E11.65 TYPE 2 DIABETES MELLITUS WITH HYPERGLYCEMIA, WITHOUT LONG-TERM CURRENT USE OF INSULIN (H): ICD-10-CM

## 2017-12-29 DIAGNOSIS — E11.65 TYPE 2 DIABETES MELLITUS WITH HYPERGLYCEMIA, WITHOUT LONG-TERM CURRENT USE OF INSULIN (H): Primary | ICD-10-CM

## 2017-12-29 PROCEDURE — 95250 CONT GLUC MNTR PHYS/QHP EQP: CPT

## 2017-12-29 NOTE — MR AVS SNAPSHOT
After Visit Summary   12/29/2017    Sola Gan    MRN: [id number]           Patient Information     Date Of Birth          1950        Visit Information        Provider Department      12/29/2017 11:30 AM Nurse, Hc Dm [facility]        Today's Diagnoses     Type 2 diabetes mellitus with hyperglycemia, without long-term current use of insulin (H)           Follow-ups after your visit        Your next 10 appointments already scheduled     Gómez 10, 2018 10:00 AM CST   (Arrive by 9:45 AM)   Continuous Glucose Monitor Evaluation with Lisy Radford NP   [facility] [place] ([facility] )    [address]  [place] [address] [phone]              Who to contact     If you have questions or need follow up information about today's clinic visit or your schedule please contact [facility] directly at [phone].  Normal or non-critical lab and imaging results will be communicated to you by MyChart, letter or phone within 4 business days after the clinic has received the results. If you do not hear from us within 7 days, please contact the clinic through Fixetudehart or phone. If you have a critical or abnormal lab result, we will notify you by phone as soon as possible.  Submit refill requests through [employer] or call your pharmacy and they will forward the refill request to us. Please allow 3 business days for your refill to be completed.          Additional Information About Your Visit        MyChart Information     [employer] gives you secure access to your electronic health record. If you see a primary care provider, you can also send messages to your care team and make appointments. If you have questions, please call your primary care clinic.  If you do not have a primary care provider, please call [phone] and they will assist you.        Care EveryWhere ID     This is your Care EveryWhere ID. This could be used by other  organizations to access your [place] medical records  [id number]         Blood Pressure from Last 3 Encounters:   12/19/17 128/78   11/02/17 112/66   10/26/17 127/78    Weight from Last 3 Encounters:   12/19/17 201 lb 11.2 oz (91.5 kg)   11/02/17 203 lb 6.4 oz (92.3 kg)   09/08/17 217 lb 9.6 oz (98.7 kg)              We Performed the Following     GLUCOSE MONITORING CONTINUOUS, >=72 HR          Today's Medication Changes          These changes are accurate as of: 12/29/17  1:28 PM.  If you have any questions, ask your nurse or doctor.               These medicines have changed or have updated prescriptions.        Dose/Directions    atorvastatin 40 MG tablet   Commonly known as:  LIPITOR   This may have changed:  when to take this   Used for:  Controlled type 2 diabetes mellitus without complication, without long-term current use of insulin (H)        Dose:  40 mg   Take 1 tablet (40 mg) by mouth daily   Quantity:  90 tablet   Refills:  3                Primary Care Provider Office Phone # Fax #    Favian Recinos [phone]       [facility] [address] [facility] [address]        Equal Access to Services     [facility]: Hadii nicol gaffney haddamario Sorhonda, waaxda luqadaha, qaybta kaalmada adeegyada, madai gann. So [place] [phone].    ATENCIÓN: Si habla español, tiene a sanchez disposición servicios gratuitos de asistencia lingüística. [facility] [phone].    We comply with applicable federal civil rights laws and [place] laws. We do not discriminate on the basis of race, color, national origin, age, disability, sex, sexual orientation, or gender identity.            Thank you!     Thank you for choosing [facility]  for your care. Our goal is always to provide you with excellent care. Hearing back from our patients is one way we can continue to improve our services. Please take a few minutes to complete the written survey that you may  receive in the mail after your visit with us. Thank you!             Your Updated Medication List - Protect others around you: Learn how to safely use, store and throw away your medicines at www.disposemymeds.org.          This list is accurate as of: 12/29/17  1:28 PM.  Always use your most recent med list.                   Brand Name Dispense Instructions for use Diagnosis    ADVIL PO      Take 600-800 mg by mouth every 8 hours as needed for moderate pain        albuterol 108 (90 BASE) MCG/ACT Inhaler    PROAIR HFA    1 Inhaler    Inhale 2 puffs into the lungs every 4 hours as needed for shortness of breath / dyspnea        aspirin 81 MG EC tablet     90 tablet    Take 1 tablet (81 mg) by mouth daily    Controlled type 2 diabetes mellitus without complication, without long-term current use of insulin (H)       atorvastatin 40 MG tablet    LIPITOR    90 tablet    Take 1 tablet (40 mg) by mouth daily    Controlled type 2 diabetes mellitus without complication, without long-term current use of insulin (H)       BENADRYL PO      Take 25 mg by mouth nightly as needed for sleep        blood glucose monitoring lancets     102 each    Use to test blood sugar 2 times daily.    Type 2 diabetes mellitus with hyperglycemia, without long-term current use of insulin (H)       blood glucose monitoring meter device kit     1 kit    Use to test blood sugar 2 times daily .    Type 2 diabetes mellitus with hyperglycemia, without long-term current use of insulin (H)       * blood glucose monitoring test strip    ONETOUCH VERIO IQ    100 each    Use to test blood sugar 2 times daily.    Type 2 diabetes mellitus with hyperglycemia, without long-term current use of insulin (H)       * blood glucose monitoring test strip    ACCU-CHEK YOLANDA PLUS    100 each    Use to test blood sugar 2 times daily.    Type 2 diabetes mellitus with hyperglycemia, without long-term current use of insulin (H)       COMBIVENT RESPIMAT  MCG/ACT inhaler    Generic drug:  Ipratropium-Albuterol     4 g    SEE NOTES    Other emphysema (H)       FLUoxetine 20 MG capsule    PROzac    90 capsule    TAKE 1 CAPSULE(20 MG) BY MOUTH DAILY    Other depression       lisinopril 10 MG tablet    PRINIVIL/ZESTRIL    30 tablet    Take 1 tablet (10 mg) by mouth daily    Benign essential hypertension       METFORMIN HCL PO      Take 500 mg by mouth 3 times daily (with meals)        MULTIVITAMIN GUMMIES ADULT PO      Take 2 chew tab by mouth daily        ondansetron 4 MG tablet    ZOFRAN    10 tablet    Take 1 tablet (4 mg) by mouth every 6 hours as needed for nausea    Cancer of sigmoid colon (H)       pantoprazole 40 MG EC tablet    PROTONIX    60 tablet    Take 1 tablet (40 mg) by mouth 2 times daily (before meals)    Abdominal pain, epigastric       PRIMATENE ASTHMA 12.5-200 MG Tabs   Generic drug:  ePHEDrine-GuaiFENesin      Take 1 tablet by mouth At Bedtime        Probiotic 250 MG Caps     180 capsule    Take 1 capsule by mouth 2 times daily    Gastritis, presence of bleeding unspecified, unspecified chronicity, unspecified gastritis type       ZYRTEC ALLERGY 10 MG tablet   Generic drug:  cetirizine      Take 10 mg by mouth daily        * Notice:  This list has 2 medication(s) that are the same as other medications prescribed for you. Read the directions carefully, and ask your doctor or other care provider to review them with you.

## 2017-12-29 NOTE — PROGRESS NOTES
Sola Say is here for a glucose sensor insertion for a CGMS study. Sensor agreement signed.    Sensor attached to Right arm. No redness, drainage or bleeding noted. Pt instructed on testing schedule, how to complete the patient log, restrictions during wear, and to remove if needs to have MRI, CT or x-ray. Pt will return on 1/10/18 for detach and evaluation.     Sensor Lot #: [id number]  Exp date: 04/30/2018        Written instructions and patient log given to patient.  Tia Virgen

## 2018-01-10 ENCOUNTER — ALLIED HEALTH/NURSE VISIT (OUTPATIENT)
Dept: WOUND CARE | Facility: OTHER | Age: 68
End: 2018-01-10
Attending: NURSE PRACTITIONER
Payer: COMMERCIAL

## 2018-01-10 VITALS
WEIGHT: 201.7 LBS | OXYGEN SATURATION: 96 % | SYSTOLIC BLOOD PRESSURE: 132 MMHG | HEART RATE: 82 BPM | HEIGHT: 61 IN | BODY MASS INDEX: 38.08 KG/M2 | DIASTOLIC BLOOD PRESSURE: 87 MMHG

## 2018-01-10 DIAGNOSIS — Z71.6 TOBACCO ABUSE COUNSELING: ICD-10-CM

## 2018-01-10 DIAGNOSIS — E11.65 TYPE 2 DIABETES MELLITUS WITH HYPERGLYCEMIA, WITHOUT LONG-TERM CURRENT USE OF INSULIN (H): Primary | ICD-10-CM

## 2018-01-10 PROCEDURE — G0463 HOSPITAL OUTPT CLINIC VISIT: HCPCS

## 2018-01-10 PROCEDURE — 95251 CONT GLUC MNTR ANALYSIS I&R: CPT | Performed by: NURSE PRACTITIONER

## 2018-01-10 PROCEDURE — 99214 OFFICE O/P EST MOD 30 MIN: CPT | Mod: 25 | Performed by: NURSE PRACTITIONER

## 2018-01-10 ASSESSMENT — PAIN SCALES - GENERAL: PAINLEVEL: NO PAIN (0)

## 2018-01-10 NOTE — NURSING NOTE
"Chief Complaint   Patient presents with     Diabetes     LibrePro eval       Initial /87  Pulse 82  Ht 5' 1\" (1.549 m)  Wt 201 lb 11.2 oz (91.5 kg)  SpO2 96%  BMI 38.11 kg/m2 Estimated body mass index is 38.11 kg/(m^2) as calculated from the following:    Height as of this encounter: 5' 1\" (1.549 m).    Weight as of this encounter: 201 lb 11.2 oz (91.5 kg).  Medication Reconciliation: complete   Tia Virgen      "

## 2018-01-10 NOTE — MR AVS SNAPSHOT
After Visit Summary   1/10/2018    Sola Gan    MRN: [id number]           Patient Information     Date Of Birth          1950        Visit Information        Provider Department      1/10/2018 10:00 AM Lisy Radford NP [facility]        Today's Diagnoses     Type 2 diabetes mellitus with hyperglycemia, without long-term current use of insulin (H)    -  1    Tobacco abuse counseling          Care Instructions    Continue working on healthy eating and moving (start low and slow, work up to 30 min, 5x/week)    BG goals:  Fasting and before meals <130, >80  2 hour after eating <180    We only need 1/2 of these numbers to be within target then your A1c will be within target    Medication changes   None for now    Recommendations:  Check you BG in the AM--especially if you feel symptomatic  Add protein to meals and snacks.      Follow up   [place] in 1 month    Call me sooner if any problems/concerns and/or questions develop including consistent low BGs <70 or consistent high BGs >200  [phone] (Unit Coordinator)    [phone] (Nurse)    HOW TO QUIT SMOKING  Smoking is one of the hardest habits to break. About half of all those who have ever smoked have been able to quit, and most of those (about 70%) who still smoke want to quit. Here are some of the best ways to stop smoking.     KEEP TRYING:  It takes most smokers about 8 tries before they are finally able to fully quit. So, the more often you try and fail, the better your chance of quitting the next time! So, don't give up!    GO COLD TURKEY:  Most ex-smokers quit cold turkey. Trying to cut back gradually doesn't seem to work as well, perhaps because it continues the smoking habit. Also, it is possible to fool yourself by inhaling more while smoking fewer cigarettes. This results in the same amount of nicotine in your body!    GET SUPPORT:  Support programs can make an important difference, especially for the heavy smoker.  These groups offer lectures, methods to change your behavior and peer support. Call the free national Quitline for more information. 800-QUIT-NOW (669-622-4367). Low-cost or free programs are offered by many hospitals, local chapters of the American Lung Association (971-640-1802) and the American Cancer Society (020-460-5055). Support at home is important too. Non-smokers can help by offering praise and encouragement. If the smoker fails to quit, encourage them to try again!    OVER-THE-COUNTER MEDICINES:  For those who can't quit on their own, Nicotine Replacement Therapy (NRT) may make quitting much easier. Certain aids such as the nicotine patch, gum and lozenge are available without a prescription. However, it is best to use these under the guidance of your doctor. The skin patch provides a steady supply of nicotine to the body. Nicotine gum and lozenge gives temporary bursts of low levels of nicotine. Both methods take the edge off the craving for cigarettes. WARNING: If you feel symptoms of nicotine overdose, such as nausea, vomiting, dizziness, weakness, or fast heartbeat, stop using these and see your doctor.    PRESCRIPTION MEDICINES:  After evaluating your smoking patterns and prior attempts at quitting, your doctor may offer a prescription medicine such as bupropion (Zyban, Wellbutrin), varenicline (Chantix, Champix), a niocotine inhaler or nasal spray. Each has its unique advantage and side effects which your doctor can review with you.    HEALTH BENEFITS OF QUITTING:  The benefits of quitting start right away and keep improving the longer you go without smokin minutes: blood pressure and pulse return to normal  8 hours: oxygen levels return to normal  2 days: ability to smell and taste begins to improve as damaged nerves start to regrow  2-3 weeks: circulation and lung function improves  1-9 months: decreased cough, congestion and shortness of breath; less tired  1 year: risk of heart attack  decreases by half  5 years: risk of lung cancer decreases by half; risk of stroke becomes the same as a non-smoker  For information about how to quit smoking, visit the following links:  National Cancer [place] ,   Clearing the Air, Quit Smoking Today   - an online booklet. http://www.smokefree.gov/pubs/clearing_the_air.pdf  Smokefree.gov http://smokefree.gov/  QuitNet http://www.quitnet.com/    4679-0475 [employer], [address] [place] [address], [place] [address]. All rights reserved. This information is not intended as a substitute for professional medical care. Always follow your healthcare professional's instructions.    The Benefits of Living Smoke Free  What do you want to gain from quitting? Check off some reasons to quit.  Health Benefits  ___ Reduce my risk of lung cancer, heart disease, chronic lung disease  ___ Have fewer wrinkles and softer skin  ___ Improve my sense of taste and smell  ___ For pregnant women--reduce the risk of having a miscarriage, stillbirth, premature birth, or low-birth-weight baby  Personal Benefits  ___ Feel more in control of my life  ___ Have better-smelling hair, breath, clothes, home, and car  ___ Save time by not having to take smoke breaks, buy cigarettes, or hunt for a light  ___ Have whiter teeth  Family Benefits  ___ Reduce my children s respiratory tract infections  ___ Set a good example for my children  ___ Reduce my family s cancer risk  Financial Benefits  ___ Save hundreds of dollars each year that would be spent on cigarettes  ___ Save money on medical bills  ___ Save on life, health, and car insurance premiums    Those Dollars Add Up!  Cigarettes are expensive, and getting more expensive all the time. Do you realize how much money you are spending on cigarettes per year? What is the average amount you spend on a pack of cigarettes? What is the average number of packs that you smoke per day? Using your answers to these questions, fill in this formula to help you  find out:  ($ _____ per pack) ×  ( _____ number of packs per day) × (365 days) =  $ _____ yearly cost of smoking  Besides tobacco, there are other costs, including extra cleaning bills and replacement costs for clothing and furniture; medical expenses for smoking-related illnesses; and higher health, life, and car insurance premiums.    Cigars and Pipes Count Too!  Cigars and pipes are also dangerous. So are smokeless (chewing) tobacco and snuff. All of these products contain nicotine, a highly addictive substance that has harmful effects on your body. Quitting smoking means giving up all tobacco products.      3009-7915 [facility], [address] [place] [address], [place] [address]. All rights reserved. This information is not intended as a substitute for professional medical care. Always follow your healthcare professional's instructions.          Follow-ups after your visit        Follow-up notes from your care team     Return in about 4 weeks (around 2/7/2018), or if symptoms worsen or fail to improve.      Your next 10 appointments already scheduled     Feb 07, 2018 11:00 AM CST   (Arrive by 10:45 AM)   Continuous Glucose Monitor Evaluation with Lisy Radford NP   [facility] ([facility] )    [address] [place] [address] [phone]              Who to contact     If you have questions or need follow up information about today's clinic visit or your schedule please contact [facility] directly at [phone].  Normal or non-critical lab and imaging results will be communicated to you by MyChart, letter or phone within 4 business days after the clinic has received the results. If you do not hear from us within 7 days, please contact the clinic through MyChart or phone. If you have a critical or abnormal lab result, we will notify you by phone as soon as possible.  Submit refill requests through Sendmybag or call your pharmacy and they will forward the  "refill request to us. Please allow 3 business days for your refill to be completed.          Additional Information About Your Visit        [employer] Information     [employer] gives you secure access to your electronic health record. If you see a primary care provider, you can also send messages to your care team and make appointments. If you have questions, please call your primary care clinic.  If you do not have a primary care provider, please call [phone] and they will assist you.        Care EveryWhere ID     This is your Care EveryWhere ID. This could be used by other organizations to access your [facility] medical records  [id number]        Your Vitals Were     Pulse Height Pulse Oximetry BMI (Body Mass Index)          82 5' 1\" (1.549 m) 96% 38.11 kg/m2         Blood Pressure from Last 3 Encounters:   01/10/18 132/87   12/19/17 128/78   11/02/17 112/66    Weight from Last 3 Encounters:   01/10/18 201 lb 11.2 oz (91.5 kg)   12/19/17 201 lb 11.2 oz (91.5 kg)   11/02/17 203 lb 6.4 oz (92.3 kg)              We Performed the Following     GLUCOSE MONITOR, 72 HOUR, PHYS INTERP     Tobacco Cessation - Order to Satisfy Health Maintenance          Today's Medication Changes          These changes are accurate as of: 1/10/18 11:59 PM.  If you have any questions, ask your nurse or doctor.               These medicines have changed or have updated prescriptions.        Dose/Directions    atorvastatin 40 MG tablet   Commonly known as:  LIPITOR   This may have changed:  when to take this   Used for:  Controlled type 2 diabetes mellitus without complication, without long-term current use of insulin (H)        Dose:  40 mg   Take 1 tablet (40 mg) by mouth daily   Quantity:  90 tablet   Refills:  3                Primary Care Provider Office Phone # Fax #    Favian Recinos [phone]       [address] [address]        Equal Access to Services     LUNA WATKINS AH: Hadii " nicol Mir, waduglasda luestuardoadaha, qaybta kaalmonica tirsoander, waxmarie idiin hayrubinjose farleyhaseebignacio keller sanjuaniat. So [facility] [phone].    ATENCIÓN: Si habla español, tiene a sanchez disposición servicios gratuitos de asistencia lingüística. Llame al [phone].    We comply with applicable federal civil rights laws and [place] laws. We do not discriminate on the basis of race, color, national origin, age, disability, sex, sexual orientation, or gender identity.            Thank you!     Thank you for choosing [facility]  for your care. Our goal is always to provide you with excellent care. Hearing back from our patients is one way we can continue to improve our services. Please take a few minutes to complete the written survey that you may receive in the mail after your visit with us. Thank you!             Your Updated Medication List - Protect others around you: Learn how to safely use, store and throw away your medicines at www.disposemymeds.org.          This list is accurate as of: 1/10/18 11:59 PM.  Always use your most recent med list.                   Brand Name Dispense Instructions for use Diagnosis    ADVIL PO      Take 600-800 mg by mouth every 8 hours as needed for moderate pain        albuterol 108 (90 BASE) MCG/ACT Inhaler    PROAIR HFA    1 Inhaler    Inhale 2 puffs into the lungs every 4 hours as needed for shortness of breath / dyspnea        aspirin 81 MG EC tablet     90 tablet    Take 1 tablet (81 mg) by mouth daily    Controlled type 2 diabetes mellitus without complication, without long-term current use of insulin (H)       atorvastatin 40 MG tablet    LIPITOR    90 tablet    Take 1 tablet (40 mg) by mouth daily    Controlled type 2 diabetes mellitus without complication, without long-term current use of insulin (H)       BENADRYL PO      Take 25 mg by mouth nightly as needed for sleep        blood glucose monitoring lancets     102 each    Use to test blood sugar 2 times daily.     Type 2 diabetes mellitus with hyperglycemia, without long-term current use of insulin (H)       blood glucose monitoring meter device kit     1 kit    Use to test blood sugar 2 times daily .    Type 2 diabetes mellitus with hyperglycemia, without long-term current use of insulin (H)       * blood glucose monitoring test strip    ONETOUCH VERIO IQ    100 each    Use to test blood sugar 2 times daily.    Type 2 diabetes mellitus with hyperglycemia, without long-term current use of insulin (H)       * blood glucose monitoring test strip    ACCU-CHEK YOLANDA PLUS    100 each    Use to test blood sugar 2 times daily.    Type 2 diabetes mellitus with hyperglycemia, without long-term current use of insulin (H)       COMBIVENT RESPIMAT  MCG/ACT inhaler   Generic drug:  Ipratropium-Albuterol     4 g    SEE NOTES    Other emphysema (H)       FLUoxetine 20 MG capsule    PROzac    90 capsule    TAKE 1 CAPSULE(20 MG) BY MOUTH DAILY    Other depression       IMODIUM A-D PO      Take 1 tablet by mouth 2 times daily        lisinopril 10 MG tablet    PRINIVIL/ZESTRIL    30 tablet    Take 1 tablet (10 mg) by mouth daily    Benign essential hypertension       METFORMIN HCL PO      Take 500 mg by mouth 3 times daily (with meals)        MULTIVITAMIN GUMMIES ADULT PO      Take 2 chew tab by mouth daily        ondansetron 4 MG tablet    ZOFRAN    10 tablet    Take 1 tablet (4 mg) by mouth every 6 hours as needed for nausea    Cancer of sigmoid colon (H)       pantoprazole 40 MG EC tablet    PROTONIX    60 tablet    Take 1 tablet (40 mg) by mouth 2 times daily (before meals)    Abdominal pain, epigastric       PRIMATENE ASTHMA 12.5-200 MG Tabs   Generic drug:  ePHEDrine-GuaiFENesin      Take 1 tablet by mouth At Bedtime        Probiotic 250 MG Caps     180 capsule    Take 1 capsule by mouth 2 times daily    Gastritis, presence of bleeding unspecified, unspecified chronicity, unspecified gastritis type       ZYRTEC ALLERGY 10 MG  tablet   Generic drug:  cetirizine      Take 10 mg by mouth daily        * Notice:  This list has 2 medication(s) that are the same as other medications prescribed for you. Read the directions carefully, and ask your doctor or other care provider to review them with you.

## 2018-01-11 NOTE — PROGRESS NOTES
"SUBJECTIVE:  Sola Gan, 67 year old, female presents with the following Chief Complaint(s) with HPI to follow:  Chief Complaint   Patient presents with     Diabetes     [facility]        [facility]      Patient is checking blood sugars: once daily.  Results:  Didn't bring meter      Symptoms of hypoglycemia (low blood sugar): yes, in the morning she feels \"shaky\"--doesn't check it    Paresthesias (numbness or burning in feet) or sores: Yes--\"chemo related\"; no sores    Diabetic eye exam within the last year: DUE    Breakfast eaten regularly: Yes    Patient counting carbs: Yes       HPI:  Sola's here today for the follow up regarding her Diabetes mellitus, Type 2.    Lab Results   Component Value Date    A1C 6.5 10/26/2017    A1C 6.7 06/26/2017    A1C 11.4 03/21/2017     Current Diabetes medication:   1.  Metformin, 1000 mg in the AM and 500 mg in the PM  ASA use: yes, 81 mg daily  Statin use: yes, atorvastatin 40 mg daily    Sola's here today for her CGM study evaluation.      Reports the following:  Diagnosed with DM: last March States she had a genital herpes outbreak and UC checked her A1c, it was elevated  Family history of DM: no    She was diagnosed with colon cancer.  Followed by Dr. Tello.  She was on chemo for 3 months, last in November.  Throughout this and watching her carbs, she's down 50+ lbs.      Patient Active Problem List   Diagnosis     COPD (chronic obstructive pulmonary disease) probable     Acute bronchitis     Morbid obesity (H)     Essential hypertension     Stasis dermatitis of both legs     MORA (obstructive sleep apnea)     Acute bronchitis with asthma     ACP (advance care planning)     Seasonal allergic rhinitis due to pollen     Controlled type 2 diabetes mellitus without complication, without long-term current use of insulin (H)     Colon tumor     Adenocarcinoma of sigmoid colon (H)     Chest pain     Hypokalemia       Past Medical History:   Diagnosis Date     " COPD (chronic obstructive pulmonary disease) (H)      Diabetes (H)      H/O sleep apnea     tonsils, adnoids, and uvula removed     Uncomplicated asthma        Past Surgical History:   Procedure Laterality Date     APPENDECTOMY       CHOLECYSTECTOMY       COLONOSCOPY N/A 6/21/2017    Procedure: COLONOSCOPY;  COLONOSCOPY WITH POLYPECTOMY, BIOPSY, ENDOSCOPIC MARKER TATOOING;  Surgeon: Rigo Coreas DO;  Location: [facility]     ENT SURGERY      for MORA     GYN SURGERY       HERNIA REPAIR       INSERT PORT VASCULAR ACCESS N/A 9/8/2017    Procedure: INSERT PORT VASCULAR ACCESS;  PORT-A-CATH PLACEMENT LEFT INTERNAL JUGULAR;  Surgeon: Awais Ding MD;  Location: [facility]       Family History   Problem Relation Age of Onset     Other Cancer Mother      ovarian      Breast Cancer Mother      Bronchitis Father      Peripheral Neuropathy Father      Hypertension Father        Social History   Substance Use Topics     Smoking status: Current Every Day Smoker     Packs/day: 0.75     Years: 20.00     Smokeless tobacco: Never Used     Alcohol use No       Current Outpatient Prescriptions   Medication Sig Dispense Refill     Loperamide HCl (IMODIUM A-D PO) Take 1 tablet by mouth 2 times daily       pantoprazole (PROTONIX) 40 MG EC tablet Take 1 tablet (40 mg) by mouth 2 times daily (before meals) 60 tablet 1     blood glucose monitoring (ACCU-CHEK YOLANDA PLUS) test strip Use to test blood sugar 2 times daily. 100 each 10     blood glucose monitoring (ACCU-CHEK FASTCLIX) lancets Use to test blood sugar 2 times daily. 102 each 10     blood glucose monitoring (ACCU-CHEK YOLANDA PLUS) meter device kit Use to test blood sugar 2 times daily  . 1 kit 0     Saccharomyces boulardii (PROBIOTIC) 250 MG CAPS Take 1 capsule by mouth 2 times daily 180 capsule 3     DiphenhydrAMINE HCl (BENADRYL PO) Take 25 mg by mouth nightly as needed for sleep       METFORMIN HCL PO Take 500 mg by mouth 3 times daily (with meals)         "ePHEDrine-GuaiFENesin (PRIMATENE ASTHMA) 12.5-200 MG TABS Take 1 tablet by mouth At Bedtime       Multiple Vitamins-Minerals (MULTIVITAMIN GUMMIES ADULT PO) Take 2 chew tab by mouth daily       FLUoxetine (PROZAC) 20 MG capsule TAKE 1 CAPSULE(20 MG) BY MOUTH DAILY 90 capsule 0     ondansetron (ZOFRAN) 4 MG tablet Take 1 tablet (4 mg) by mouth every 6 hours as needed for nausea 10 tablet 0     cetirizine (ZYRTEC ALLERGY) 10 MG tablet Take 10 mg by mouth daily       COMBIVENT RESPIMAT  MCG/ACT inhaler SEE NOTES 4 g 1     lisinopril (PRINIVIL/ZESTRIL) 10 MG tablet Take 1 tablet (10 mg) by mouth daily 30 tablet 8     aspirin 81 MG EC tablet Take 1 tablet (81 mg) by mouth daily 90 tablet 3     atorvastatin (LIPITOR) 40 MG tablet Take 1 tablet (40 mg) by mouth daily (Patient taking differently: Take 40 mg by mouth At Bedtime ) 90 tablet 3     blood glucose monitoring (ONE TOUCH VERIO IQ) test strip Use to test blood sugar 2 times daily. 100 each 10     Ibuprofen (ADVIL PO) Take 600-800 mg by mouth every 8 hours as needed for moderate pain        albuterol (ALBUTEROL) 108 (90 BASE) MCG/ACT Inhaler Inhale 2 puffs into the lungs every 4 hours as needed for shortness of breath / dyspnea 1 Inhaler 0       Allergies   Allergen Reactions     Acyclovir Nausea     Sulfa Drugs Hives       REVIEW OF SYSTEMS  Skin: dry  Eyes: DUE  Ears/Nose/Throat: history of allergies  Respiratory: history of mild COPD  Cardiovascular: negative  Gastrointestinal: history of colon cancer  Genitourinary: positive for frequency  Musculoskeletal: positive for arthritis  Neurologic: positive for numbness or tingling of hands and numbness or tingling of feet  Psychiatric: positive for depression  Hematologic/Lymphatic/Immunologic: negative  Endocrine: positive for diabetes    OBJECTIVE:  /87  Pulse 82  Ht 5' 1\" (1.549 m)  Wt 201 lb 11.2 oz (91.5 kg)  SpO2 96%  BMI 38.11 kg/m2  Constitutional: healthy, alert and no distress  Neck: neck " supple, no lymphadenopathy, trachea midline, thyroid symmetrical with out nodules noted.  Carotid arteries without bruit  Cardiovascular: RRR. No murmurs, clicks gallops or rub  Respiratory:  Good diaphragmatic excursion. Lungs clear  Psychiatric: mentation appears normal and affect normal/bright  Hematologic/Lymphatic/Immunologic: Normal cervical lymph nodes      LABS  Results for orders placed or performed during the hospital encounter of 10/25/17   XR Chest 2 Views    Narrative    PROCEDURE:  XR CHEST 2 VW    HISTORY: chest tightness, .    COMPARISON:  6/30/2017    FINDINGS: A left chest port is in appropriate position.  The cardiomediastinal contours are normal.  The trachea is midline.  No focal consolidation, effusion or pneumothorax.    No suspicious osseous lesion or subdiaphragmatic free air.      Impression    IMPRESSION:      No acute cardiopulmonary process.      SUJEY STOREY MD   CBC with platelets differential   Result Value Ref Range    WBC 2.5 (L) 4.0 - 11.0 10e9/L    RBC Count 4.85 3.8 - 5.2 10e12/L    Hemoglobin 14.4 11.7 - 15.7 g/dL    Hematocrit 43.0 35.0 - 47.0 %    MCV 89 78 - 100 fl    MCH 29.7 26.5 - 33.0 pg    MCHC 33.5 31.5 - 36.5 g/dL    RDW 16.6 (H) 10.0 - 15.0 %    Platelet Count 158 150 - 450 10e9/L    Diff Method Automated Method     % Neutrophils 43.3 %    % Lymphocytes 39.3 %    % Monocytes 8.9 %    % Eosinophils 6.5 %    % Basophils 1.6 %    % Immature Granulocytes 0.4 %    Nucleated RBCs 0 0 /100    Absolute Neutrophil 1.1 (L) 1.6 - 8.3 10e9/L    Absolute Lymphocytes 1.0 0.8 - 5.3 10e9/L    Absolute Monocytes 0.2 0.0 - 1.3 10e9/L    Absolute Eosinophils 0.2 0.0 - 0.7 10e9/L    Absolute Basophils 0.0 0.0 - 0.2 10e9/L    Abs Immature Granulocytes 0.0 0 - 0.4 10e9/L    Absolute Nucleated RBC 0.0    Comprehensive metabolic panel   Result Value Ref Range    Sodium 137 133 - 144 mmol/L    Potassium 3.3 (L) 3.4 - 5.3 mmol/L    Chloride 102 94 - 109 mmol/L    Carbon Dioxide 26 20 -  32 mmol/L    Anion Gap 9 3 - 14 mmol/L    Glucose 122 (H) 70 - 99 mg/dL    Urea Nitrogen 16 7 - 30 mg/dL    Creatinine 0.70 0.52 - 1.04 mg/dL    GFR Estimate 84 >60 mL/min/1.7m2    GFR Estimate If Black >90 >60 mL/min/1.7m2    Calcium 9.1 8.5 - 10.1 mg/dL    Bilirubin Total 0.4 0.2 - 1.3 mg/dL    Albumin 3.8 3.4 - 5.0 g/dL    Protein Total 7.8 6.8 - 8.8 g/dL    Alkaline Phosphatase 87 40 - 150 U/L    ALT 46 0 - 50 U/L    AST 27 0 - 45 U/L   D-Dimer ([facility])   Result Value Ref Range    D-Dimer ng/mL 247 0 - 300 ng/ml D-DU   Troponin I   Result Value Ref Range    Troponin I ES <0.015 0.000 - 0.045 ug/L   Troponin I   Result Value Ref Range    Troponin I ES <0.015 0.000 - 0.045 ug/L   Glucose by meter   Result Value Ref Range    Glucose 127 (H) 70 - 99 mg/dL   Glucose by meter   Result Value Ref Range    Glucose 151 (H) 70 - 99 mg/dL   Potassium   Result Value Ref Range    Potassium 4.2 3.4 - 5.3 mmol/L   Hemoglobin A1c   Result Value Ref Range    Hemoglobin A1C 6.5 (H) 4.3 - 6.0 %   Estimated Average Glucose   Result Value Ref Range    Estimated Average Glucose 140 mg/dL   CBC with platelets differential   Result Value Ref Range    WBC 2.5 (L) 4.0 - 11.0 10e9/L    RBC Count 4.46 3.8 - 5.2 10e12/L    Hemoglobin 13.2 11.7 - 15.7 g/dL    Hematocrit 39.3 35.0 - 47.0 %    MCV 88 78 - 100 fl    MCH 29.6 26.5 - 33.0 pg    MCHC 33.6 31.5 - 36.5 g/dL    RDW 16.5 (H) 10.0 - 15.0 %    Platelet Count 153 150 - 450 10e9/L    Diff Method Automated Method     % Neutrophils 37.5 %    % Lymphocytes 43.5 %    % Monocytes 7.7 %    % Eosinophils 8.9 %    % Basophils 2.0 %    % Immature Granulocytes 0.4 %    Nucleated RBCs 0 0 /100    Absolute Neutrophil 0.9 (L) 1.6 - 8.3 10e9/L    Absolute Lymphocytes 1.1 0.8 - 5.3 10e9/L    Absolute Monocytes 0.2 0.0 - 1.3 10e9/L    Absolute Eosinophils 0.2 0.0 - 0.7 10e9/L    Absolute Basophils 0.1 0.0 - 0.2 10e9/L    Abs Immature Granulocytes 0.0 0 - 0.4 10e9/L    Absolute Nucleated RBC 0.0    Stress  EKG Interpretation    Narrative    This is an Exercise Cardiolite study on  Sola Gan ordered by CAITY Matute& Favian Recinos for chest pain.    Pt. was placed upon the treadmill using the Eligio Protocol.  She went  for a total time of 6:32 stopped due to fatigue. The last minute of  study had to e reduced speed due to her fatigue and inability to  continue to maintain walking.    Resting HR was 99 josemanuel to136.  Resting /70 josemanuel to 142/70 for a  double product of 18,500.  5.8 Mets and 87% of maximum heart rate.  Review of ECG shows no acute STT changes or arrhythmias.    Assessment:  Exercise Cardiolite Study patient had no chest pain just  fatigue.  Poor exercise tolerance  No evidence of ischemia on ECG.  Cardiolite scans are pending.    KLARISSA NOYOLA MD       ASSESSMENT / PLAN:  () Type 2 diabetes mellitus with hyperglycemia, without long-term current use of insulin (H)  (primary encounter diagnosis)  Comment:   Findings:  Ave. S  Est. A1c: 4.6%     Date: 17    Ave. Glucose: 96   Time in target: 100%  Time below target: 0%  Time above target: 0%    Date: 17    Ave. Glucose: 97   Time in target: 94%  Time below target: 6%  Time above target: 0%    Date: 17    Ave. Glucose: 87   Time in target: 84%  Time below target: 16%  Time above target: 0%    Date: 18    Ave. Glucose: 96   Time in target: 94%  Time below target: 6%  Time above target: 0%    Date: 18    Ave. Glucose: 98   Time in target: 91%  Time below target: 9%  Time above target: 0%    Date: 1/3/18    Ave. Glucose: 86   Time in target: 77%  Time below target: 23%  Time above target: 0%    Date: 18    Ave. Glucose: 96   Time in target: 87%  Time below target: 13%  Time above target: 0%    Date: 18    Ave. Glucose: 75   Time in target: 57%  Time below target: 43%  Time above target: 0%    Date: 18    Ave. Glucose: 79   Time in target: 67%  Time below target: 33%  Time above target: 0%    Date: 18   "  Ave. Glucose: 76   Time in target: 43%  Time below target: 57%  Time above target: 0%    Date: 1/8/18    Ave. Glucose: 83   Time in target: 67%  Time below target: 33%  Time above target: 0%    Date: 1/9/18    Ave. Glucose: 78   Time in target: 44%  Time below target: 56%  Time above target: 0%    Date: 1/10/18    Ave. Glucose: 69   Time in target: 37%  Time below target: 63%  Time above target: 0%    Distribution Data:  Percentage above 180: 0%  Percentage within : 72%  Percentage below 70: 28%    Hypoglycemia incidence:  Yes, noted she SG does fall <70 on the overnight.    Again, states on occasion (not everyday) she does feel \"different\" in the morning.  Short lived.  Feels better after consuming coffee    Food choices/Carbohydrate counting:  Didn't bring meal plan    Exercise:  Works on getting 10,000 steps per day.  During the zahra, she typically gets 6,000    Recommendations:  See note    Plan: GLUCOSE MONITOR, 72 HOUR, PHYS INTERP          Encourage Sola to check a BG in the AM.    Please let us know if she's having issues with low BGs    (Z71.6) Tobacco abuse counseling  Comment: noted and refused  Plan: Tobacco Cessation - Order to Satisfy Health         Maintenance          Sola's aware to let us know when she's ready to quit smoking.  Discussed the dangers of smoking with diabetes    Patient Instructions   Continue working on healthy eating and moving (start low and slow, work up to 30 min, 5x/week)    BG goals:  Fasting and before meals <130, >80  2 hour after eating <180    We only need 1/2 of these numbers to be within target then your A1c will be within target    Medication changes   None for now    Recommendations:  Check you BG in the AM--especially if you feel symptomatic  Add protein to meals and snacks.      Follow up   [place] in 1 month    Call me sooner if any problems/concerns and/or questions develop including consistent low BGs <70 or consistent high BGs >200  [phone] (Unit " Coordinator)    [phone] (Nurse)    HOW TO QUIT SMOKING  Smoking is one of the hardest habits to break. About half of all those who have ever smoked have been able to quit, and most of those (about 70%) who still smoke want to quit. Here are some of the best ways to stop smoking.     KEEP TRYING:  It takes most smokers about 8 tries before they are finally able to fully quit. So, the more often you try and fail, the better your chance of quitting the next time! So, don't give up!    GO COLD TURKEY:  Most ex-smokers quit cold turkey. Trying to cut back gradually doesn't seem to work as well, perhaps because it continues the smoking habit. Also, it is possible to fool yourself by inhaling more while smoking fewer cigarettes. This results in the same amount of nicotine in your body!    GET SUPPORT:  Support programs can make an important difference, especially for the heavy smoker. These groups offer lectures, methods to change your behavior and peer support. Call the free national Quitline for more information. 800-QUIT-NOW (048-430-6778). Low-cost or free programs are offered by many hospitals, local chapters of the American Lung Association (209-114-3300) and the American Cancer Society (812-782-7406). Support at home is important too. Non-smokers can help by offering praise and encouragement. If the smoker fails to quit, encourage them to try again!    OVER-THE-COUNTER MEDICINES:  For those who can't quit on their own, Nicotine Replacement Therapy (NRT) may make quitting much easier. Certain aids such as the nicotine patch, gum and lozenge are available without a prescription. However, it is best to use these under the guidance of your doctor. The skin patch provides a steady supply of nicotine to the body. Nicotine gum and lozenge gives temporary bursts of low levels of nicotine. Both methods take the edge off the craving for cigarettes. WARNING: If you feel symptoms of nicotine overdose, such as nausea,  vomiting, dizziness, weakness, or fast heartbeat, stop using these and see your doctor.    PRESCRIPTION MEDICINES:  After evaluating your smoking patterns and prior attempts at quitting, your doctor may offer a prescription medicine such as bupropion (Zyban, Wellbutrin), varenicline (Chantix, Champix), a niocotine inhaler or nasal spray. Each has its unique advantage and side effects which your doctor can review with you.    HEALTH BENEFITS OF QUITTING:  The benefits of quitting start right away and keep improving the longer you go without smokin minutes: blood pressure and pulse return to normal  8 hours: oxygen levels return to normal  2 days: ability to smell and taste begins to improve as damaged nerves start to regrow  2-3 weeks: circulation and lung function improves  1-9 months: decreased cough, congestion and shortness of breath; less tired  1 year: risk of heart attack decreases by half  5 years: risk of lung cancer decreases by half; risk of stroke becomes the same as a non-smoker  For information about how to quit smoking, visit the following links:  National Cancer [place] ,   Clearing the Air, Quit Smoking Today   - an online booklet. http://www.smokefree.gov/pubs/clearing_the_air.pdf  Smokefree.gov http://smokefree.gov/  QuitNet http://www.quitnet.com/    2332-5128 [facility], [address] [place] [address]. All rights reserved. This information is not intended as a substitute for professional medical care. Always follow your healthcare professional's instructions.    The Benefits of Living Smoke Free  What do you want to gain from quitting? Check off some reasons to quit.  Health Benefits  ___ Reduce my risk of lung cancer, heart disease, chronic lung disease  ___ Have fewer wrinkles and softer skin  ___ Improve my sense of taste and smell  ___ For pregnant women--reduce the risk of having a miscarriage, stillbirth, premature birth, or low-birth-weight baby  Personal  Benefits  ___ Feel more in control of my life  ___ Have better-smelling hair, breath, clothes, home, and car  ___ Save time by not having to take smoke breaks, buy cigarettes, or hunt for a light  ___ Have whiter teeth  Family Benefits  ___ Reduce my children s respiratory tract infections  ___ Set a good example for my children  ___ Reduce my family s cancer risk  Financial Benefits  ___ Save hundreds of dollars each year that would be spent on cigarettes  ___ Save money on medical bills  ___ Save on life, health, and car insurance premiums    Those Dollars Add Up!  Cigarettes are expensive, and getting more expensive all the time. Do you realize how much money you are spending on cigarettes per year? What is the average amount you spend on a pack of cigarettes? What is the average number of packs that you smoke per day? Using your answers to these questions, fill in this formula to help you find out:  ($ _____ per pack) ×  ( _____ number of packs per day) × (365 days) =  $ _____ yearly cost of smoking  Besides tobacco, there are other costs, including extra cleaning bills and replacement costs for clothing and furniture; medical expenses for smoking-related illnesses; and higher health, life, and car insurance premiums.    Cigars and Pipes Count Too!  Cigars and pipes are also dangerous. So are smokeless (chewing) tobacco and snuff. All of these products contain nicotine, a highly addictive substance that has harmful effects on your body. Quitting smoking means giving up all tobacco products.      4734-2444 [facility], [address] [place] [address], [place] [address]. All rights reserved. This information is not intended as a substitute for professional medical care. Always follow your healthcare professional's instructions.    Time: 50 minutes  Barrier: none  Willingness to learn: accepting    Lisy BELL FNP-BC  Diabetes and Wound Care    50 minutes was spent with patient.    Over 50%  of this time was  spent on counseling patient regarding illness, medication and/or treatment options, coordinating further cares and follow ups that are needed along with resource material that will be helpful in the treatment of these issues.       With the electronic record, we can now more quickly and easily track our patient diabetic goals. Our diabetes clinical review is in progress and these are the indicators we are monitoring for good diabetes health.     1.) HbA1C less than 7 (measurement of your average blood sugars)  2.) Blood Pressure less than 140/80  3.) LDL less than 100 (bad cholesterol)  4.) HbA1C is checked in the last 6 months and below 7% (more frequently if not at goal or adjusting medications)  5.) LDL is checked in the last 12 months (more frequently if not at goal or adjusting medications)  6.) Taking one baby aspirin daily (unless otherwise instructed)  7.) No tobacco use  8) Statin use     You have achieved 6 out of 8 of these and I am encouraging you to come in and get tuned up to achieve 8 out of 8!  Here is what you have achieved so far in my goals for you:  1.) HbA1C  less than 7:                              YES     Your last  HbA1C :  Lab Results   Component Value Date    A1C 6.5 10/26/2017   .  2.) Blood Pressure less than 140/80:       YES      Your last    BP Readings from Last 1 Encounters:   01/10/18 132/87     3.) LDL less than 100:                              NO      Your last     LDL Cholesterol Calculated   Date Value Ref Range Status   03/28/2017 104 (H) <100 mg/dL Final     Comment:     Above desirable:  100-129 mg/dl   Borderline High:  130-159 mg/dL   High:             160-189 mg/dL   Very high:       >189 mg/dl     ]    4.) Checked HbA1C in the past 6 months: YES      5.) Checked LDL in the past 12 months:    YES      6.) Taking one aspirin daily:                       YES     7.) No tobacco use:                                        NO   8.) Statin use      YES

## 2018-01-11 NOTE — PATIENT INSTRUCTIONS
Continue working on healthy eating and moving (start low and slow, work up to 30 min, 5x/week)    BG goals:  Fasting and before meals <130, >80  2 hour after eating <180    We only need 1/2 of these numbers to be within target then your A1c will be within target    Medication changes   None for now    Recommendations:  Check you BG in the AM--especially if you feel symptomatic  Add protein to meals and snacks.      Follow up   Madison in 1 month    Call me sooner if any problems/concerns and/or questions develop including consistent low BGs <70 or consistent high BGs >200  [phone] (Unit Coordinator)    [phone] (Nurse)    HOW TO QUIT SMOKING  Smoking is one of the hardest habits to break. About half of all those who have ever smoked have been able to quit, and most of those (about 70%) who still smoke want to quit. Here are some of the best ways to stop smoking.     KEEP TRYING:  It takes most smokers about 8 tries before they are finally able to fully quit. So, the more often you try and fail, the better your chance of quitting the next time! So, don't give up!    GO COLD TURKEY:  Most ex-smokers quit cold turkey. Trying to cut back gradually doesn't seem to work as well, perhaps because it continues the smoking habit. Also, it is possible to fool yourself by inhaling more while smoking fewer cigarettes. This results in the same amount of nicotine in your body!    GET SUPPORT:  Support programs can make an important difference, especially for the heavy smoker. These groups offer lectures, methods to change your behavior and peer support. Call the free national Quitline for more information. 800-QUIT-NOW (807-465-1677). Low-cost or free programs are offered by many hospitals, local chapters of the American Lung Association (069-894-7794) and the American Cancer Society (283-340-2879). Support at home is important too. Non-smokers can help by offering praise and encouragement. If the smoker fails to quit,  encourage them to try again!    OVER-THE-COUNTER MEDICINES:  For those who can't quit on their own, Nicotine Replacement Therapy (NRT) may make quitting much easier. Certain aids such as the nicotine patch, gum and lozenge are available without a prescription. However, it is best to use these under the guidance of your doctor. The skin patch provides a steady supply of nicotine to the body. Nicotine gum and lozenge gives temporary bursts of low levels of nicotine. Both methods take the edge off the craving for cigarettes. WARNING: If you feel symptoms of nicotine overdose, such as nausea, vomiting, dizziness, weakness, or fast heartbeat, stop using these and see your doctor.    PRESCRIPTION MEDICINES:  After evaluating your smoking patterns and prior attempts at quitting, your doctor may offer a prescription medicine such as bupropion (Zyban, Wellbutrin), varenicline (Chantix, Champix), a niocotine inhaler or nasal spray. Each has its unique advantage and side effects which your doctor can review with you.    HEALTH BENEFITS OF QUITTING:  The benefits of quitting start right away and keep improving the longer you go without smokin minutes: blood pressure and pulse return to normal  8 hours: oxygen levels return to normal  2 days: ability to smell and taste begins to improve as damaged nerves start to regrow  2-3 weeks: circulation and lung function improves  1-9 months: decreased cough, congestion and shortness of breath; less tired  1 year: risk of heart attack decreases by half  5 years: risk of lung cancer decreases by half; risk of stroke becomes the same as a non-smoker  For information about how to quit smoking, visit the following links:  National Cancer [place] ,   Clearing the Air, Quit Smoking Today   - an online booklet. http://www.smokefree.gov/pubs/clearing_the_air.pdf  Smokefree.gov http://smokefree.gov/  QuitNet http://www.quitnet.com/    0436-1076 Angel Chow, [address] [facility],  [place] [address]. All rights reserved. This information is not intended as a substitute for professional medical care. Always follow your healthcare professional's instructions.    The Benefits of Living Smoke Free  What do you want to gain from quitting? Check off some reasons to quit.  Health Benefits  ___ Reduce my risk of lung cancer, heart disease, chronic lung disease  ___ Have fewer wrinkles and softer skin  ___ Improve my sense of taste and smell  ___ For pregnant women--reduce the risk of having a miscarriage, stillbirth, premature birth, or low-birth-weight baby  Personal Benefits  ___ Feel more in control of my life  ___ Have better-smelling hair, breath, clothes, home, and car  ___ Save time by not having to take smoke breaks, buy cigarettes, or hunt for a light  ___ Have whiter teeth  Family Benefits  ___ Reduce my children s respiratory tract infections  ___ Set a good example for my children  ___ Reduce my family s cancer risk  Financial Benefits  ___ Save hundreds of dollars each year that would be spent on cigarettes  ___ Save money on medical bills  ___ Save on life, health, and car insurance premiums    Those Dollars Add Up!  Cigarettes are expensive, and getting more expensive all the time. Do you realize how much money you are spending on cigarettes per year? What is the average amount you spend on a pack of cigarettes? What is the average number of packs that you smoke per day? Using your answers to these questions, fill in this formula to help you find out:  ($ _____ per pack) ×  ( _____ number of packs per day) × (365 days) =  $ _____ yearly cost of smoking  Besides tobacco, there are other costs, including extra cleaning bills and replacement costs for clothing and furniture; medical expenses for smoking-related illnesses; and higher health, life, and car insurance premiums.    Cigars and Pipes Count Too!  Cigars and pipes are also dangerous. So are smokeless (chewing) tobacco and snuff.  All of these products contain nicotine, a highly addictive substance that has harmful effects on your body. Quitting smoking means giving up all tobacco products.      3085-0168 Angel [facility], [address] [place] [address], [place] [address]. All rights reserved. This information is not intended as a substitute for professional medical care. Always follow your healthcare professional's instructions.

## 2018-01-13 DIAGNOSIS — F32.89 OTHER DEPRESSION: ICD-10-CM

## 2018-01-13 DIAGNOSIS — E11.9 TYPE 2 DIABETES MELLITUS WITHOUT COMPLICATION, WITHOUT LONG-TERM CURRENT USE OF INSULIN (H): ICD-10-CM

## 2018-02-07 ENCOUNTER — ALLIED HEALTH/NURSE VISIT (OUTPATIENT)
Dept: WOUND CARE | Facility: OTHER | Age: 68
End: 2018-02-07
Attending: NURSE PRACTITIONER
Payer: MEDICARE

## 2018-02-07 DIAGNOSIS — E11.65 TYPE 2 DIABETES MELLITUS WITH HYPERGLYCEMIA, WITHOUT LONG-TERM CURRENT USE OF INSULIN (H): Primary | ICD-10-CM

## 2018-02-07 PROCEDURE — 99207 ZZC NO CHARGE NURSE ONLY: CPT | Performed by: NURSE PRACTITIONER

## 2018-02-07 NOTE — PROGRESS NOTES
Sola's here today for a nurse only BG review, not a CGM study as this was done in January.      Sola states she's had less shaky feelings.      BG review as followed:  Highest: 148  Lowest: 100  Period average: 119    Values above, >180: 0  Values within goal (): 13  Values below goal, <70: 0    She's doing great.    Keep up the hard work.   Please contact us if low BG continue.     Follow up in May for A1c.     Lisy Radford APRN FNP-BC  Diabetes and Wound Care

## 2018-02-07 NOTE — MR AVS SNAPSHOT
After Visit Summary   2/7/2018    Sola Gan    MRN: [id number]           Patient Information     Date Of Birth          1950        Visit Information        Provider Department      2/7/2018 11:00 AM Lisy Radford NP [facility]        Today's Diagnoses     Type 2 diabetes mellitus with hyperglycemia, without long-term current use of insulin (H)    -  1      Care Instructions    Follow up as scheduled          Follow-ups after your visit        Follow-up notes from your care team     Return in about 3 months (around 5/7/2018).      Your next 10 appointments already scheduled     May 03, 2018  1:00 PM CDT   (Arrive by 12:45 PM)   Return Visit with Madison Zambrano RN   [facility] ([facility] )    [address] [place] [address] [phone]              Who to contact     If you have questions or need follow up information about today's clinic visit or your schedule please contact [facility] directly at [phone].  Normal or non-critical lab and imaging results will be communicated to you by CO-Valuehart, letter or phone within 4 business days after the clinic has received the results. If you do not hear from us within 7 days, please contact the clinic through CIHIt or phone. If you have a critical or abnormal lab result, we will notify you by phone as soon as possible.  Submit refill requests through StockLayouts or call your pharmacy and they will forward the refill request to us. Please allow 3 business days for your refill to be completed.          Additional Information About Your Visit        CO-Valuehart Information     StockLayouts gives you secure access to your electronic health record. If you see a primary care provider, you can also send messages to your care team and make appointments. If you have questions, please call your primary care clinic.  If you do not have a primary care provider, please call [phone] and they will assist  you.        Care EveryWhere ID     This is your Care EveryWhere ID. This could be used by other organizations to access your [place] medical records  [id number]         Blood Pressure from Last 3 Encounters:   01/10/18 132/87   12/19/17 128/78   11/02/17 112/66    Weight from Last 3 Encounters:   01/10/18 201 lb 11.2 oz (91.5 kg)   12/19/17 201 lb 11.2 oz (91.5 kg)   11/02/17 203 lb 6.4 oz (92.3 kg)              Today, you had the following     No orders found for display         Today's Medication Changes          These changes are accurate as of 2/7/18 11:26 AM.  If you have any questions, ask your nurse or doctor.               These medicines have changed or have updated prescriptions.        Dose/Directions    atorvastatin 40 MG tablet   Commonly known as:  LIPITOR   This may have changed:  when to take this   Used for:  Controlled type 2 diabetes mellitus without complication, without long-term current use of insulin (H)        Dose:  40 mg   Take 1 tablet (40 mg) by mouth daily   Quantity:  90 tablet   Refills:  3                Primary Care Provider Office Phone # Fax #    Favian Recinos [phone]       [address] [address]        Equal Access to Services     LUNA WATKINS AH: Hadii nicol ku hadasho Soomaali, waaxda luqadaha, qaybta kaalmada adeegyada, waxay popyee gann. So [facility] [phone].    ATENCIÓN: Si habla español, tiene a sanchez disposición servicios gratuitos de asistencia lingüística. Llame al [phone].    We comply with applicable federal civil rights laws and [place] laws. We do not discriminate on the basis of race, color, national origin, age, disability, sex, sexual orientation, or gender identity.            Thank you!     Thank you for choosing [facility]  for your care. Our goal is always to provide you with excellent care. Hearing back from our patients is one way we can continue to improve  our services. Please take a few minutes to complete the written survey that you may receive in the mail after your visit with us. Thank you!             Your Updated Medication List - Protect others around you: Learn how to safely use, store and throw away your medicines at www.disposemymeds.org.          This list is accurate as of 2/7/18 11:26 AM.  Always use your most recent med list.                   Brand Name Dispense Instructions for use Diagnosis    ADVIL PO      Take 600-800 mg by mouth every 8 hours as needed for moderate pain        albuterol 108 (90 BASE) MCG/ACT Inhaler    PROAIR HFA    1 Inhaler    Inhale 2 puffs into the lungs every 4 hours as needed for shortness of breath / dyspnea        aspirin 81 MG EC tablet     90 tablet    Take 1 tablet (81 mg) by mouth daily    Controlled type 2 diabetes mellitus without complication, without long-term current use of insulin (H)       atorvastatin 40 MG tablet    LIPITOR    90 tablet    Take 1 tablet (40 mg) by mouth daily    Controlled type 2 diabetes mellitus without complication, without long-term current use of insulin (H)       BENADRYL PO      Take 25 mg by mouth nightly as needed for sleep        blood glucose monitoring lancets     102 each    Use to test blood sugar 2 times daily.    Type 2 diabetes mellitus with hyperglycemia, without long-term current use of insulin (H)       blood glucose monitoring meter device kit     1 kit    Use to test blood sugar 2 times daily .    Type 2 diabetes mellitus with hyperglycemia, without long-term current use of insulin (H)       * blood glucose monitoring test strip    ONETOUCH VERIO IQ    100 each    Use to test blood sugar 2 times daily.    Type 2 diabetes mellitus with hyperglycemia, without long-term current use of insulin (H)       * blood glucose monitoring test strip    ACCU-CHEK YOLANDA PLUS    100 each    Use to test blood sugar 2 times daily.    Type 2 diabetes mellitus with hyperglycemia, without  long-term current use of insulin (H)       COMBIVENT RESPIMAT  MCG/ACT inhaler   Generic drug:  Ipratropium-Albuterol     4 g    SEE NOTES    Other emphysema (H)       FLUoxetine 20 MG capsule    PROzac    90 capsule    TAKE 1 CAPSULE(20 MG) BY MOUTH DAILY    Other depression       IMODIUM A-D PO      Take 1 tablet by mouth 2 times daily        lisinopril 10 MG tablet    PRINIVIL/ZESTRIL    30 tablet    Take 1 tablet (10 mg) by mouth daily    Benign essential hypertension       * METFORMIN HCL PO      Take 500 mg by mouth 3 times daily (with meals)        * metFORMIN 500 MG tablet    GLUCOPHAGE    90 tablet    TAKE 1 TABLET(500 MG) BY MOUTH THREE TIMES DAILY WITH MEALS    Type 2 diabetes mellitus without complication, without long-term current use of insulin (H)       MULTIVITAMIN GUMMIES ADULT PO      Take 2 chew tab by mouth daily        ondansetron 4 MG tablet    ZOFRAN    10 tablet    Take 1 tablet (4 mg) by mouth every 6 hours as needed for nausea    Cancer of sigmoid colon (H)       pantoprazole 40 MG EC tablet    PROTONIX    60 tablet    Take 1 tablet (40 mg) by mouth 2 times daily (before meals)    Abdominal pain, epigastric       PRIMATENE ASTHMA 12.5-200 MG Tabs   Generic drug:  ePHEDrine-GuaiFENesin      Take 1 tablet by mouth At Bedtime        Probiotic 250 MG Caps     180 capsule    Take 1 capsule by mouth 2 times daily    Gastritis, presence of bleeding unspecified, unspecified chronicity, unspecified gastritis type       ZYRTEC ALLERGY 10 MG tablet   Generic drug:  cetirizine      Take 10 mg by mouth daily        * Notice:  This list has 4 medication(s) that are the same as other medications prescribed for you. Read the directions carefully, and ask your doctor or other care provider to review them with you.

## 2018-02-12 DIAGNOSIS — E11.9 TYPE 2 DIABETES MELLITUS WITHOUT COMPLICATION, WITHOUT LONG-TERM CURRENT USE OF INSULIN (H): ICD-10-CM

## 2018-02-21 DIAGNOSIS — R10.13 ABDOMINAL PAIN, EPIGASTRIC: ICD-10-CM

## 2018-02-21 RX ORDER — PANTOPRAZOLE SODIUM 40 MG/1
40 TABLET, DELAYED RELEASE ORAL
Qty: 60 TABLET | Refills: 1 | Status: SHIPPED | OUTPATIENT
Start: 2018-02-21 | End: 2018-04-12

## 2018-02-21 NOTE — TELEPHONE ENCOUNTER
pantoprazole (PROTONIX) 40 MG EC tablet     Last Written Prescription Date:  12/21/17  Last Fill Quantity: 60,   # refills: 1  Last Office Visit: 11/2/17  Future Office visit:    Next 5 appointments (look out 90 days)     May 03, 2018  1:00 PM CDT   (Arrive by 12:45 PM)   Return Visit with Madison Zambrano RN   [facility] Diabetes Education ([facility] )    [address] [place] [address] [phone]

## 2018-03-15 DIAGNOSIS — I10 BENIGN ESSENTIAL HYPERTENSION: ICD-10-CM

## 2018-03-16 RX ORDER — LISINOPRIL 10 MG/1
TABLET ORAL
Qty: 30 TABLET | Refills: 5 | Status: SHIPPED | OUTPATIENT
Start: 2018-03-16 | End: 2018-09-03

## 2018-04-12 DIAGNOSIS — E11.9 TYPE 2 DIABETES MELLITUS WITHOUT COMPLICATION, WITHOUT LONG-TERM CURRENT USE OF INSULIN (H): ICD-10-CM

## 2018-04-12 DIAGNOSIS — R10.13 ABDOMINAL PAIN, EPIGASTRIC: ICD-10-CM

## 2018-04-12 RX ORDER — PANTOPRAZOLE SODIUM 40 MG/1
TABLET, DELAYED RELEASE ORAL
Qty: 60 TABLET | Refills: 4 | Status: SHIPPED | OUTPATIENT
Start: 2018-04-12 | End: 2018-09-03

## 2018-04-12 NOTE — LETTER
April 12, 2018      [facility] Gan     [facility] [address]        Dear Sola,     APPOINTMENT REMINDER:   Our records indicates that it is time for you to be seen for your 6 month diabetic follow-up and labs.      Your current medication request will be approved for one refill but you will need to be seen before any additional refills can be approved. Taking care of your health is important to us, and ongoing visits with your provider are vital to your care.    We look forward to seeing you in the near future.  You may call our office at [phone] to schedule a visit.     Please disregard this notice if you have already made an appointment.        Sincerely,  Favian Recinos MD

## 2018-04-12 NOTE — TELEPHONE ENCOUNTER
pantoprazole (PROTONIX) 40 MG EC tablet    Last Written Prescription Date:  2/21/18  Last Fill Quantity: 60,   # refills: 1  Last Office Visit: 11/2/17  Future Office visit:    Next 5 appointments (look out 90 days)     May 03, 2018  1:00 PM CDT   (Arrive by 12:45 PM)   Return Visit with Madison Zambrano RN   [facility] ([facility] )    [address] [place] [id number] [phone]                   Metformin       Last Written Prescription Date:  2/13/18  Last Fill Quantity: 90,   # refills: 1  Last Office Visit: 11/2/17  Future Office visit:    Next 5 appointments (look out 90 days)     May 03, 2018  1:00 PM CDT   (Arrive by 12:45 PM)   Return Visit with Madison Zambrano RN   [facility] ([facility] )    [address] [place] [address] [phone]

## 2018-05-02 ENCOUNTER — TELEPHONE (OUTPATIENT)
Dept: FAMILY MEDICINE | Facility: OTHER | Age: 68
End: 2018-05-02

## 2018-05-02 DIAGNOSIS — E11.9 CONTROLLED TYPE 2 DIABETES MELLITUS WITHOUT COMPLICATION, WITHOUT LONG-TERM CURRENT USE OF INSULIN (H): Primary | ICD-10-CM

## 2018-05-03 ENCOUNTER — HOSPITAL ENCOUNTER (OUTPATIENT)
Dept: EDUCATION SERVICES | Facility: HOSPITAL | Age: 68
Discharge: HOME | End: 2018-05-03
Attending: FAMILY MEDICINE | Admitting: FAMILY MEDICINE
Payer: MEDICARE

## 2018-05-03 VITALS
HEART RATE: 106 BPM | SYSTOLIC BLOOD PRESSURE: 124 MMHG | OXYGEN SATURATION: 98 % | DIASTOLIC BLOOD PRESSURE: 70 MMHG | WEIGHT: 204.5 LBS | HEIGHT: 61 IN | BODY MASS INDEX: 38.61 KG/M2

## 2018-05-03 DIAGNOSIS — E11.9 CONTROLLED TYPE 2 DIABETES MELLITUS WITHOUT COMPLICATION, WITHOUT LONG-TERM CURRENT USE OF INSULIN (H): Primary | ICD-10-CM

## 2018-05-03 LAB — HBA1C MFR BLD: 5.8 % (ref 0–5.7)

## 2018-05-03 PROCEDURE — 36416 COLLJ CAPILLARY BLOOD SPEC: CPT

## 2018-05-03 PROCEDURE — 83036 HEMOGLOBIN GLYCOSYLATED A1C: CPT

## 2018-05-03 PROCEDURE — G0108 DIAB MANAGE TRN  PER INDIV: HCPCS

## 2018-05-03 ASSESSMENT — PAIN SCALES - GENERAL: PAINLEVEL: NO PAIN (0)

## 2018-05-03 NOTE — PROGRESS NOTES
"Sola is here for diabetes review. /70  Pulse 106  Ht 1.549 m (5' 1\")  Wt 92.8 kg (204 lb 8 oz)  SpO2 98%  BMI 38.64 kg/m2. She is taking Metformin 500 mg tid with meals. Readings range as follows: post-supper: , 115      Lab Results   Component Value Date    A1C 5.8 05/03/2018    A1C 6.5 10/26/2017    A1C 6.7 06/26/2017    A1C 11.4 03/21/2017     Sola is doing very well with her diabetes management. She is frustrated that she has gained 3 lbs since our last visit. With the winter months, she has been unable to go outside and walk. Her plan is to work on her exercise now that the weather is nicer. She continues to do well with her food choices and portions.    Sola would like to lose enough weight so she could go off of her Metformin eventually. With today's A1C of 5.8%, I discussed with her that she could trial off of one of her Metformin tablets so that she would be taking Metformin 500 mg am and 500 mg pm. I asked her to check some fasting BG before she stops that one tablet so she has a baseline for where that FBG is now. And then as she goes forward, she should test some FBG, as well as, the post-supper readings.    Sola states that she and her daughter's family still plan to move back to [place]. However, specific planning has not started, as yet.    Plan:  Trial off one tablet of Metformin 500 mg    Test more FBG as above    Call with questions/concerns    Return to Diabetes Ed in 3 months.    Time spent with patient 30 minutes.  "

## 2018-05-08 DIAGNOSIS — E11.9 TYPE 2 DIABETES MELLITUS WITHOUT COMPLICATION, WITHOUT LONG-TERM CURRENT USE OF INSULIN (H): ICD-10-CM

## 2018-05-10 NOTE — TELEPHONE ENCOUNTER
Metformin       Last Written Prescription Date:  Reported by patient on med list   Last Fill Quantity: unknown,   # refills: unknown   Last Office Visit: 11/02/2017  Future Office visit:    Next 5 appointments (look out 90 days)     Aug 02, 2018  1:00 PM CDT   (Arrive by 12:45 PM)   Return Visit with Madison Zambrano RN   [facility] ([facility] )    [address] [place] [address] [phone]

## 2018-05-16 DIAGNOSIS — F32.89 OTHER DEPRESSION: ICD-10-CM

## 2018-05-17 NOTE — TELEPHONE ENCOUNTER
Prozac       Last Written Prescription Date:  1/16/2018  Last Fill Quantity: 90,   # refills: 0  Last Office Visit: 11/02/2017  Future Office visit:    Next 5 appointments (look out 90 days)     Aug 02, 2018  1:00 PM CDT   (Arrive by 12:45 PM)   Return Visit with Madison Zambrano RN   [facility] Diabetes Education ([facility] )    [address] [place] [id number] [phone]

## 2018-07-10 DIAGNOSIS — J43.8 OTHER EMPHYSEMA (H): ICD-10-CM

## 2018-07-11 RX ORDER — IPRATROPIUM BROMIDE AND ALBUTEROL 20; 100 UG/1; UG/1
SPRAY, METERED RESPIRATORY (INHALATION)
Qty: 4 G | Refills: 0 | Status: SHIPPED | OUTPATIENT
Start: 2018-07-11 | End: 2018-08-06

## 2018-07-14 DIAGNOSIS — R06.02 SHORTNESS OF BREATH: Primary | ICD-10-CM

## 2018-07-16 RX ORDER — ALBUTEROL SULFATE 90 UG/1
AEROSOL, METERED RESPIRATORY (INHALATION)
Qty: 8.5 G | Refills: 1 | Status: SHIPPED | OUTPATIENT
Start: 2018-07-16

## 2018-07-16 NOTE — TELEPHONE ENCOUNTER
Pro-air - last signed by Zuri Contreras in Urgent Care.  Please advise. Thank you  Last visit: 11.2.17  Last refill: 3.21.17 #1 inhaler     Next 5 appointments (look out 90 days)     Aug 02, 2018  1:00 PM CDT   (Arrive by 12:45 PM)   Return Visit with Madison Zambrano RN   [facility] Education ([facility] )    [address] [place] [address] [phone]

## 2018-07-29 DIAGNOSIS — E11.9 TYPE 2 DIABETES MELLITUS WITHOUT COMPLICATION, WITHOUT LONG-TERM CURRENT USE OF INSULIN (H): ICD-10-CM

## 2018-07-30 DIAGNOSIS — E11.9 TYPE 2 DIABETES MELLITUS WITHOUT COMPLICATION, WITHOUT LONG-TERM CURRENT USE OF INSULIN (H): ICD-10-CM

## 2018-07-31 ENCOUNTER — OFFICE VISIT (OUTPATIENT)
Dept: SURGERY | Facility: OTHER | Age: 68
End: 2018-07-31
Attending: SURGERY
Payer: MEDICARE

## 2018-07-31 ENCOUNTER — RADIANT APPOINTMENT (OUTPATIENT)
Dept: GENERAL RADIOLOGY | Facility: OTHER | Age: 68
End: 2018-07-31
Attending: SURGERY
Payer: MEDICARE

## 2018-07-31 VITALS
BODY MASS INDEX: 41.54 KG/M2 | OXYGEN SATURATION: 96 % | SYSTOLIC BLOOD PRESSURE: 122 MMHG | WEIGHT: 220 LBS | RESPIRATION RATE: 20 BRPM | DIASTOLIC BLOOD PRESSURE: 68 MMHG | HEIGHT: 61 IN | TEMPERATURE: 99.4 F | HEART RATE: 99 BPM

## 2018-07-31 DIAGNOSIS — Z12.11 SPECIAL SCREENING FOR MALIGNANT NEOPLASMS, COLON: ICD-10-CM

## 2018-07-31 DIAGNOSIS — F17.200 TOBACCO DEPENDENCE: ICD-10-CM

## 2018-07-31 DIAGNOSIS — J44.9 CHRONIC OBSTRUCTIVE PULMONARY DISEASE, UNSPECIFIED COPD TYPE (H): ICD-10-CM

## 2018-07-31 DIAGNOSIS — E11.9 TYPE 2 DIABETES MELLITUS WITHOUT COMPLICATION, WITHOUT LONG-TERM CURRENT USE OF INSULIN (H): ICD-10-CM

## 2018-07-31 DIAGNOSIS — E66.01 MORBID OBESITY (H): ICD-10-CM

## 2018-07-31 DIAGNOSIS — Z71.6 ENCOUNTER FOR TOBACCO USE CESSATION COUNSELING: ICD-10-CM

## 2018-07-31 DIAGNOSIS — C18.7 CANCER OF SIGMOID COLON (H): Primary | ICD-10-CM

## 2018-07-31 DIAGNOSIS — C18.7 CANCER OF SIGMOID COLON (H): ICD-10-CM

## 2018-07-31 LAB
ANION GAP SERPL CALCULATED.3IONS-SCNC: 5 MMOL/L (ref 3–14)
BUN SERPL-MCNC: 10 MG/DL (ref 7–30)
CALCIUM SERPL-MCNC: 9 MG/DL (ref 8.5–10.1)
CHLORIDE SERPL-SCNC: 105 MMOL/L (ref 94–109)
CO2 SERPL-SCNC: 29 MMOL/L (ref 20–32)
CREAT SERPL-MCNC: 0.73 MG/DL (ref 0.52–1.04)
ERYTHROCYTE [DISTWIDTH] IN BLOOD BY AUTOMATED COUNT: 14.1 % (ref 10–15)
GFR SERPL CREATININE-BSD FRML MDRD: 79 ML/MIN/1.7M2
GLUCOSE SERPL-MCNC: 112 MG/DL (ref 70–99)
HCT VFR BLD AUTO: 46.2 % (ref 35–47)
HGB BLD-MCNC: 15.3 G/DL (ref 11.7–15.7)
MCH RBC QN AUTO: 28.5 PG (ref 26.5–33)
MCHC RBC AUTO-ENTMCNC: 33.1 G/DL (ref 31.5–36.5)
MCV RBC AUTO: 86 FL (ref 78–100)
PLATELET # BLD AUTO: 229 10E9/L (ref 150–450)
POTASSIUM SERPL-SCNC: 4 MMOL/L (ref 3.4–5.3)
RBC # BLD AUTO: 5.36 10E12/L (ref 3.8–5.2)
SODIUM SERPL-SCNC: 139 MMOL/L (ref 133–144)
WBC # BLD AUTO: 6.4 10E9/L (ref 4–11)

## 2018-07-31 PROCEDURE — G0463 HOSPITAL OUTPT CLINIC VISIT: HCPCS | Mod: 25

## 2018-07-31 PROCEDURE — 85027 COMPLETE CBC AUTOMATED: CPT | Mod: ZL | Performed by: SURGERY

## 2018-07-31 PROCEDURE — 71046 X-RAY EXAM CHEST 2 VIEWS: CPT | Mod: TC

## 2018-07-31 PROCEDURE — 80048 BASIC METABOLIC PNL TOTAL CA: CPT | Mod: ZL | Performed by: SURGERY

## 2018-07-31 PROCEDURE — G0463 HOSPITAL OUTPT CLINIC VISIT: HCPCS

## 2018-07-31 PROCEDURE — 99214 OFFICE O/P EST MOD 30 MIN: CPT | Mod: 25 | Performed by: SURGERY

## 2018-07-31 PROCEDURE — 93005 ELECTROCARDIOGRAM TRACING: CPT

## 2018-07-31 PROCEDURE — 36415 COLL VENOUS BLD VENIPUNCTURE: CPT | Mod: ZL | Performed by: SURGERY

## 2018-07-31 PROCEDURE — 93010 ELECTROCARDIOGRAM REPORT: CPT | Performed by: INTERNAL MEDICINE

## 2018-07-31 PROCEDURE — 25000128 H RX IP 250 OP 636

## 2018-07-31 RX ORDER — SODIUM, POTASSIUM,MAG SULFATES 17.5-3.13G
1 SOLUTION, RECONSTITUTED, ORAL ORAL SEE ADMIN INSTRUCTIONS
Qty: 2 BOTTLE | Refills: 0 | Status: SHIPPED | OUTPATIENT
Start: 2018-07-31 | End: 2018-10-10

## 2018-07-31 ASSESSMENT — PAIN SCALES - GENERAL: PAINLEVEL: NO PAIN (0)

## 2018-07-31 NOTE — NURSING NOTE
"Chief Complaint   Patient presents with     Consult     Colon consult 1 year recheck. Hisotry of colon ca.       Initial /68  Pulse 99  Temp 99.4  F (37.4  C) (Tympanic)  Resp 20  Ht 5' 1\" (1.549 m)  Wt 220 lb (99.8 kg)  SpO2 96%  BMI 41.57 kg/m2 Estimated body mass index is 41.57 kg/(m^2) as calculated from the following:    Height as of this encounter: 5' 1\" (1.549 m).    Weight as of this encounter: 220 lb (99.8 kg).  Medication Reconciliation: complete    Zuri Ryan LPN    "

## 2018-07-31 NOTE — PATIENT INSTRUCTIONS
Thank you for allowing Dr. Coreas and our surgical team to participate in your care.  If you have a scheduling or an appointment question please contact Elif [facility] Health Unit Coordinator at her direct line [phone].   ALL nursing questions or concerns can be directed to Jenny at: [phone]     You are scheduled for a: COLONOSCOPY  Your procedure date is: AUGUST 16TH 2018      HOW TO PREPARE-      You need a friend or family member available to drive you home AND stay with you for 24 hours after you leave the hospital. You will not be allowed to drive yourself. IF you need to take a taxi or the bus you MUST have a responsible person to ride with you. YOUR PROCEDURE WILL BE CANCELLED IF YOU DO NOT HAVE A RESPONSIBLE ADULT TO DRIVE YOU HOME.       You CANNOT have anything to eat or drink after midnight the night before your surgery, ncluding water and coffee. Your stomach needs to be completely empty. Do NOT chew gum, suck on hard candy, or smoke. You can brush your teeth the morning of surgery.       You need to call our Surgery Education Nurses 1-2 weeks prior to your surgery date at  [phone] or toll free [phone]. Please have you medication and allergy lists ready.      Stop your aspirin or other NSAIDs(Ibuprofen, Motrin, Aleve, Celebrex, Naproxen, etc...) 7 days before your surgery.      Hospital admitting will call you the day before your surgery with your arrival time. If you are scheduled on a Monday admitting will call you the Friday before.      Please call your primary care physician if you should become ill within 24 hours of scheduled surgery. (ex.vomiting, diarrhea, fever)          You will need to wash the night before AND the morning of you procedure with the supplied Hibiclens. Wash your Surgical area with your bare hands, apply friction and rinse. KEEP IT AWAY FROM YOUR EYES, EARS, NOSE AND MOUTH.   Bowel Prep-suprep    Clear liquid diet only on the day before the examination. A  menu should have been provided for you.    Tsai prep - mix your first bottle of suprep in the fill cup with water or a clear liquid from your list ( no red or purple, unless you are scheduled with Dr. Coreas, as no restrictions apply) up to the fill line. Drink entire contents. Follow up with two 16 hours container of water of next hour.    Tsai prep - on day of your procedure at 0400 take out second bottle of suprep. Mix with water or clear liquid up to fill line. Drink entire contents. Follow up with two 16 ounce containers of water over the next hour.    Nothing by mouth 3 hours before your arrival time in admitting for your procedure.

## 2018-07-31 NOTE — PROGRESS NOTES
Surgery Consult Clinic Note      RE: Sola Gan  : 1950    Chief Complaint:  colonoscopy    History of Present Illness:  Mrs. Gan is a very pleasant 67 year old female who I am seeing at the request of Dr. Alex Tello MD for evaluation of screening colon malignant neoplasm and consideration for colonoscopy.  She's known to me from a colonoscopy in 2017 which a cancer was found in her sigmoid colon.  This was resected by Dr. Willams with the beto.  3 of 18 lymph nodes were positive.  She underwent 3 rounds of chemotherapy but had to stop because of peripheral neuropathy.  She specifically denies fever, chills, nausea, vomiting, chest pain, shortness of breath or palpitations.      Medical history:  Past Medical History:   Diagnosis Date     COPD (chronic obstructive pulmonary disease) (H)      Diabetes (H)      H/O sleep apnea     tonsils, adnoids, and uvula removed     Uncomplicated asthma        Surgical history:  Past Surgical History:   Procedure Laterality Date     APPENDECTOMY       CHOLECYSTECTOMY       COLONOSCOPY N/A 2017    Procedure: COLONOSCOPY;  COLONOSCOPY WITH POLYPECTOMY, BIOPSY, ENDOSCOPIC MARKER TATOOING;  Surgeon: Rigo Coreas DO;  Location: [facility]     ENT SURGERY      for MORA     GYN SURGERY       HERNIA REPAIR       INSERT PORT VASCULAR ACCESS N/A 2017    Procedure: INSERT PORT VASCULAR ACCESS;  PORT-A-CATH PLACEMENT LEFT INTERNAL JUGULAR;  Surgeon: Awais Ding MD;  Location: [facility]       Family history:  Family History   Problem Relation Age of Onset     Other Cancer Mother      ovarian      Breast Cancer Mother      Bronchitis Father      Peripheral Neuropathy Father      Hypertension Father        Medications:  Prior to Admission medications    Medication Sig Start Date End Date Taking? Authorizing Provider   aspirin 81 MG EC tablet Take 1 tablet (81 mg) by mouth daily 17  Yes Favian Recinos MD   atorvastatin (LIPITOR) 40 MG tablet  TAKE 1 TABLET(40 MG) BY MOUTH DAILY 3/26/18  Yes Favian Recinos MD   blood glucose monitoring (ACCU-CHEK YOLANDA PLUS) meter device kit Use to test blood sugar 2 times daily  . 11/17/17  Yes Lisy Radford NP   blood glucose monitoring (ACCU-CHEK YOLANDA PLUS) test strip Use to test blood sugar 2 times daily. 11/17/17  Yes Favian Recinos MD   blood glucose monitoring (ACCU-CHEK FASTCLIX) lancets Use to test blood sugar 2 times daily. 11/17/17  Yes Favian Recinos MD   COMBIVENT RESPIMAT  MCG/ACT inhaler SEE NOTES 7/11/18  Yes Favian Recinos MD   DiphenhydrAMINE HCl (BENADRYL PO) Take 25 mg by mouth nightly as needed for sleep   Yes Unknown, Entered By History   ePHEDrine-GuaiFENesin (PRIMATENE ASTHMA) 12.5-200 MG TABS Take 1 tablet by mouth At Bedtime   Yes Unknown, Entered By History   FLUoxetine (PROZAC) 20 MG capsule TAKE 1 CAPSULE(20 MG) BY MOUTH DAILY 5/17/18  Yes Favian Recinos MD   Ibuprofen (ADVIL PO) Take 600-800 mg by mouth every 8 hours as needed for moderate pain    Yes Reported, Patient   lisinopril (PRINIVIL/ZESTRIL) 10 MG tablet TAKE 1 TABLET(10 MG) BY MOUTH DAILY 3/16/18  Yes Favian Recinos MD   Loperamide HCl (IMODIUM A-D PO) Take 1 tablet by mouth 2 times daily   Yes Reported, Patient   loratadine-pseudoePHEDrine (CLARITIN-D 24-HOUR)  MG per 24 hr tablet Take 1 tablet by mouth daily   Yes Reported, Patient   metFORMIN (GLUCOPHAGE) 500 MG tablet TAKE ONE TABLET BY MOUTH THREE TIMES DAILY WITH MEALS 7/30/18  Yes Favian Recinos MD   METFORMIN HCL PO Take 500 mg by mouth 3 times daily (with meals)    Yes Unknown, Entered By History   Multiple Vitamins-Minerals (MULTIVITAMIN GUMMIES ADULT PO) Take 2 chew tab by mouth daily   Yes Unknown, Entered By History   ondansetron (ZOFRAN) 4 MG tablet Take 1 tablet (4 mg) by mouth every 6 hours as needed for nausea 9/8/17  Yes Awais Ding MD   pantoprazole (PROTONIX) 40 MG EC tablet TAKE 1 TABLET(40 MG) BY MOUTH TWICE  "DAILY BEFORE MEALS 4/12/18  Yes Favian Recinos MD   PROAIR  (90 Base) MCG/ACT inhaler INHALE 2 PUFFS BY MOUTH EVERY 4 HOURS AS NEEDED FOR SHORTNESS OF BREATH 7/16/18  Yes Favian Recinos MD   Saccharomyces boulardii (PROBIOTIC) 250 MG CAPS Take 1 capsule by mouth 2 times daily 11/2/17  Yes Favian Recinos MD       Allergies:  The patientis allergic to acyclovir and sulfa drugs.  .  Social history:  Social History   Substance Use Topics     Smoking status: Current Every Day Smoker     Packs/day: 0.75     Years: 20.00     Smokeless tobacco: Never Used     Alcohol use No     Marital status: single.    Review of Systems:    Constitutional: Negative for fever, chills and weight loss.   HENT: Negative for ear pain, nosebleeds, congestion, sore throat, tinnitus and ear discharge.    Eyes: Negative for blurred vision, double vision, photophobia and pain.   Respiratory: Negative for cough, hemoptysis, shortness of breath, wheezing and stridor.    Cardiovascular: Negative for chest pain, palpitations and orthopnea.   Gastrointestinal: Negative for heartburn, nausea, vomiting, abdominal pain and blood in stool.   Genitourinary: Negative for urgency, frequency and hematuria.   Musculoskeletal: Negative for myalgias, back pain and joint pain.   Neurological: Negative for tingling, speech change and headaches.   Endo/Heme/Allergies: Does not bruise/bleed easily.   Psychiatric/Behavioral: Negative for depression, suicidal ideas and hallucinations. The patient is not nervous/anxious.    Physical Examination:  /68  Pulse 99  Temp 99.4  F (37.4  C) (Tympanic)  Resp 20  Ht 5' 1\" (1.549 m)  Wt 220 lb (99.8 kg)  SpO2 96%  BMI 41.57 kg/m2  General: AAOx4, NAD, WN/WD, ambulating without assistance  HEENT:NCAT, EOMI, PERRL Sclerae anicteric; Trachea mideline, no JVD  Chest:   Clear to auscultation bilaterally.  Cardiac: S1S2 , regular rate and rhythm without additional sounds  Abdomen: Obese, soft, ND/NT no " rebound, no guarding  Extremities: Cursory exam unremarkable.  Skin: Warm, dry, < 2 sec cap refill  Neuro: CN 2-12 grossly intact, no focal deficit, GCS 15  Psych: happy, calm, asks appropriate questions    # Pain Assessment:  Current Pain Score 7/31/18   Patient currently in pain? no   Pain score (0-10)    Pain location    Pain descriptors    Sola reed pain level was assessed and she currently denies pain.        Assessment/Plan:  #1 Sigmoid colon cancer  #2 Screening colon malignant neoplasm  #3 COPD  #4 Morbid obesity  #5 DM II without insulin  #6 tobacco dependence     Thank you for the consult.  Mrs. Gan and REYES had a long and sissy discussion about colonoscopies.  The indications, risks, benefits, althernatives and technical aspects of whole colon colonoscopy were outlined with risks including, but not limited to, perforation, bleeding and inability to visualize entire colon.  Management of each was reviewed including the risk for life saving surgery and possible admittance to the ICU.  The need of mechanical preparation of the colon was reviewed along with the use of monitored anesthetic care which is needed to ensure proper visualization and safety concerns should biopsy be needed.  The patient's questions were asked and answered.  Scheduled first available date.      Dr Coreas  [facility]  [address], [address]  [place]    [address]    Referring Provider:  Alex Tello MD  [place] [address] [place] [id number]     Primary Care Provider:  Favian Recinos

## 2018-07-31 NOTE — MR AVS SNAPSHOT
After Visit Summary   7/31/2018    Sola Gan    MRN: [id number]           Patient Information     Date Of Birth          1950        Visit Information        Provider Department      7/31/2018 2:30 PM Rigo Coreas, DO [facility]        Today's Diagnoses     Cancer of sigmoid colon (H)    -  1    Type 2 diabetes mellitus without complication, without long-term current use of insulin (H)        Morbid obesity (H)        Chronic obstructive pulmonary disease, unspecified COPD type (H)        Special screening for malignant neoplasms, colon          Care Instructions    Thank you for allowing Dr. Coreas and our surgical team to participate in your care.  If you have a scheduling or an appointment question please contact [facility] Unit Coordinator at her direct line [phone].   ALL nursing questions or concerns can be directed to Jenny at: [phone]     You are scheduled for a: COLONOSCOPY  Your procedure date is: AUGUST 16TH 2018      HOW TO PREPARE-      You need a friend or family member available to drive you home AND stay with you for 24 hours after you leave the hospital. You will not be allowed to drive yourself. IF you need to take a taxi or the bus you MUST have a responsible person to ride with you. YOUR PROCEDURE WILL BE CANCELLED IF YOU DO NOT HAVE A RESPONSIBLE ADULT TO DRIVE YOU HOME.       You CANNOT have anything to eat or drink after midnight the night before your surgery, ncluding water and coffee. Your stomach needs to be completely empty. Do NOT chew gum, suck on hard candy, or smoke. You can brush your teeth the morning of surgery.       You need to call our Surgery Education Nurses 1-2 weeks prior to your surgery date at  [phone] or toll free [phone]. Please have you medication and allergy lists ready.      Stop your aspirin or other NSAIDs(Ibuprofen, Motrin, Aleve, Celebrex, Naproxen, etc...) 7 days before your  surgery.      Hospital admitting will call you the day before your surgery with your arrival time. If you are scheduled on a Monday admitting will call you the Friday before.      Please call your primary care physician if you should become ill within 24 hours of scheduled surgery. (ex.vomiting, diarrhea, fever)          You will need to wash the night before AND the morning of you procedure with the supplied Hibiclens. Wash your Surgical area with your bare hands, apply friction and rinse. KEEP IT AWAY FROM YOUR EYES, EARS, NOSE AND MOUTH.   Bowel Prep-suprep    Clear liquid diet only on the day before the examination. A menu should have been provided for you.    Tsai prep - mix your first bottle of suprep in the fill cup with water or a clear liquid from your list ( no red or purple, unless you are scheduled with Dr. Coreas, as no restrictions apply) up to the fill line. Drink entire contents. Follow up with two 16 hours container of water of next hour.    Tsai prep - on day of your procedure at 0400 take out second bottle of suprep. Mix with water or clear liquid up to fill line. Drink entire contents. Follow up with two 16 ounce containers of water over the next hour.    Nothing by mouth 3 hours before your arrival time in admitting for your procedure.            Follow-ups after your visit        Your next 10 appointments already scheduled     Aug 01, 2018 12:00 PM CDT   (Arrive by 11:00 AM)   CT CHEST/ABDOMEN/PELVIS W CONTRAST with HICT1, HIXRRN   [facility] ([facility] )    [address] [place] [id number] [phone]           Please bring any scans or X-rays taken at other hospitals, if similar tests were done. Also bring a list of your medicines, including vitamins, minerals and over-the-counter drugs. It is safest to leave personal items at home.  Be sure to tell your doctor:   If you have any allergies.   If there s any chance you are pregnant.   If you are breastfeeding.  How to  prepare:   Do not eat or drink for 2 hours before your exam. If you need to take medicine, you may take it with small sips of water. (We may ask you to take liquid medicine as well.)   Please wear loose clothing, such as a sweat suit or jogging clothes. Avoid snaps, zippers and other metal. We may ask you to undress and put on a hospital gown.  Please arrive 30 minutes early for your CT. Once in the department you might be asked to drink water 15-20 minutes prior to your exam.  If indicated you may be asked to drink an oral contrast in advance of your CT.  If this is the case, the imaging team will let you know or be in contact with you prior to your appointment  Patients over 70 or patients with diabetes or kidney problems:   If you haven t had a blood test (creatinine test) within the last 30 days, the Cardiologist/Radiologist may require you to get this test prior to your exam.  If you have diabetes:   Continue to take your metformin medication on the day of your exam  If you have any questions, please call the Imaging Department where you will have your exam.            Aug 02, 2018  1:00 PM CDT   (Arrive by 12:45 PM)   Return Visit with Madison Zambrano RN   [facility] ([facility] )    [address] [place] [id number] [phone]              Future tests that were ordered for you today     Open Future Orders        Priority Expected Expires Ordered    XR CHEST 2 VW (Clinic Performed) Routine 7/31/2018 7/31/2019 7/31/2018            Who to contact     If you have questions or need follow up information about today's clinic visit or your schedule please contact [facility] directly at [phone].  Normal or non-critical lab and imaging results will be communicated to you by MyChart, letter or phone within 4 business days after the clinic has received the results. If you do not hear from us within 7 days, please contact the clinic through MyChart or phone. If you have  "a critical or abnormal lab result, we will notify you by phone as soon as possible.  Submit refill requests through [employer] or call your pharmacy and they will forward the refill request to us. Please allow 3 business days for your refill to be completed.          Additional Information About Your Visit        [employer] Information     [employer] gives you secure access to your electronic health record. If you see a primary care provider, you can also send messages to your care team and make appointments. If you have questions, please call your primary care clinic.  If you do not have a primary care provider, please call [phone] and they will assist you.        Care EveryWhere ID     This is your Care EveryWhere ID. This could be used by other organizations to access your [facility] medical records  [id number]        Your Vitals Were     Pulse Temperature Respirations Height Pulse Oximetry BMI (Body Mass Index)    99 99.4  F (37.4  C) (Tympanic) 20 5' 1\" (1.549 m) 96% 41.57 kg/m2       Blood Pressure from Last 3 Encounters:   07/31/18 122/68   05/03/18 124/70   01/10/18 132/87    Weight from Last 3 Encounters:   07/31/18 220 lb (99.8 kg)   05/03/18 204 lb 8 oz (92.8 kg)   01/10/18 201 lb 11.2 oz (91.5 kg)              We Performed the Following     Basic metabolic panel     CBC with platelets     EKG 12-lead complete w/read - (Clinic Performed)          Today's Medication Changes          These changes are accurate as of 7/31/18  3:05 PM.  If you have any questions, ask your nurse or doctor.               Start taking these medicines.        Dose/Directions    Na Sulfate-K Sulfate-Mg Sulf solution   Commonly known as:  SUPREP BOWEL PREP   Used for:  Cancer of sigmoid colon (H)   Started by:  Rigo Coreas DO        Dose:  1 Bottle   Take 177 mLs (1 Bottle) by mouth See Admin Instructions   Quantity:  2 Bottle   Refills:  0            Where to get your medicines      These medications were sent to [id number] - [place], [place] - [address] AT [facility] [address], [facility] [address]     Phone:  [phone]     Na Sulfate-K Sulfate-Mg Sulf solution                Primary Care Provider Office Phone # Fax #    Favian Recinos [phone]       [address] [place] [address] E  [facility] [address]        Equal Access to Services     LUNA WATKINS : Hadii aad ku hadasho Soomaali, waaxda luqadaha, qaybta kaalmada adeegyada, waxay idiin hayaan adeeg kharash lack . So [facility] [phone].    ATENCIÓN: Si neptali correa, tiene a sanchez disposición servicios gratuitos de asistencia lingüística. [facility] [phone].    We comply with applicable federal civil rights laws and [place] laws. We do not discriminate on the basis of race, color, national origin, age, disability, sex, sexual orientation, or gender identity.            Thank you!     Thank you for choosing [facility]  for your care. Our goal is always to provide you with excellent care. Hearing back from our patients is one way we can continue to improve our services. Please take a few minutes to complete the written survey that you may receive in the mail after your visit with us. Thank you!             Your Updated Medication List - Protect others around you: Learn how to safely use, store and throw away your medicines at www.disposemymeds.org.          This list is accurate as of 7/31/18  3:05 PM.  Always use your most recent med list.                   Brand Name Dispense Instructions for use Diagnosis    ADVIL PO      Take 600-800 mg by mouth every 8 hours as needed for moderate pain        aspirin 81 MG EC tablet     90 tablet    Take 1 tablet (81 mg) by mouth daily    Controlled type 2 diabetes mellitus without complication, without long-term current use of insulin (H)       atorvastatin 40 MG tablet    LIPITOR    90 tablet    TAKE 1 TABLET(40 MG) BY MOUTH DAILY    Controlled type 2 diabetes mellitus without  complication, without long-term current use of insulin (H)       BENADRYL PO      Take 25 mg by mouth nightly as needed for sleep        blood glucose monitoring lancets     102 each    Use to test blood sugar 2 times daily.    Type 2 diabetes mellitus with hyperglycemia, without long-term current use of insulin (H)       blood glucose monitoring meter device kit     1 kit    Use to test blood sugar 2 times daily .    Type 2 diabetes mellitus with hyperglycemia, without long-term current use of insulin (H)       blood glucose monitoring test strip    ACCU-CHEK YOLANDA PLUS    100 each    Use to test blood sugar 2 times daily.    Type 2 diabetes mellitus with hyperglycemia, without long-term current use of insulin (H)       COMBIVENT RESPIMAT  MCG/ACT inhaler   Generic drug:  Ipratropium-Albuterol     4 g    SEE NOTES    Other emphysema (H)       FLUoxetine 20 MG capsule    PROzac    90 capsule    TAKE 1 CAPSULE(20 MG) BY MOUTH DAILY    Other depression       IMODIUM A-D PO      Take 1 tablet by mouth 2 times daily        lisinopril 10 MG tablet    PRINIVIL/ZESTRIL    30 tablet    TAKE 1 TABLET(10 MG) BY MOUTH DAILY    Benign essential hypertension       loratadine-pseudoePHEDrine  MG per 24 hr tablet    CLARITIN-D 24-hour     Take 1 tablet by mouth daily        * METFORMIN HCL PO      Take 500 mg by mouth 3 times daily (with meals)        * metFORMIN 500 MG tablet    GLUCOPHAGE    90 tablet    TAKE ONE TABLET BY MOUTH THREE TIMES DAILY WITH MEALS    Type 2 diabetes mellitus without complication, without long-term current use of insulin (H)       MULTIVITAMIN GUMMIES ADULT PO      Take 2 chew tab by mouth daily        Na Sulfate-K Sulfate-Mg Sulf solution    SUPREP BOWEL PREP    2 Bottle    Take 177 mLs (1 Bottle) by mouth See Admin Instructions    Cancer of sigmoid colon (H)       ondansetron 4 MG tablet    ZOFRAN    10 tablet    Take 1 tablet (4 mg) by mouth every 6 hours as needed for nausea    Cancer of  sigmoid colon (H)       pantoprazole 40 MG EC tablet    PROTONIX    60 tablet    TAKE 1 TABLET(40 MG) BY MOUTH TWICE DAILY BEFORE MEALS    Abdominal pain, epigastric       PRIMATENE ASTHMA 12.5-200 MG Tabs   Generic drug:  ePHEDrine-GuaiFENesin      Take 1 tablet by mouth At Bedtime        PROAIR  (90 Base) MCG/ACT Inhaler   Generic drug:  albuterol     8.5 g    INHALE 2 PUFFS BY MOUTH EVERY 4 HOURS AS NEEDED FOR SHORTNESS OF BREATH    Shortness of breath       Probiotic 250 MG Caps     180 capsule    Take 1 capsule by mouth 2 times daily    Gastritis, presence of bleeding unspecified, unspecified chronicity, unspecified gastritis type       * Notice:  This list has 2 medication(s) that are the same as other medications prescribed for you. Read the directions carefully, and ask your doctor or other care provider to review them with you.

## 2018-07-31 NOTE — TELEPHONE ENCOUNTER
Metformin      Last Written Prescription Date:  7/30  Last Fill Quantity: 90,   # refills: 0  Last Office Visit: 11/02/2017  Future Office visit:    Next 5 appointments (look out 90 days)     Aug 02, 2018  1:00 PM CDT   (Arrive by 12:45 PM)   Return Visit with Madison Zambrano RN   [facility] ([facility] )    [address] [place] [id number] [phone]

## 2018-08-01 ENCOUNTER — HOSPITAL ENCOUNTER (OUTPATIENT)
Dept: CT IMAGING | Facility: HOSPITAL | Age: 68
Discharge: HOME | End: 2018-08-01
Attending: INTERNAL MEDICINE | Admitting: INTERNAL MEDICINE
Payer: MEDICARE

## 2018-08-01 DIAGNOSIS — C18.7 MALIGNANT NEOPLASM OF SIGMOID COLON (H): ICD-10-CM

## 2018-08-01 PROCEDURE — 25000128 H RX IP 250 OP 636: Performed by: RADIOLOGY

## 2018-08-01 PROCEDURE — 74177 CT ABD & PELVIS W/CONTRAST: CPT | Mod: TC

## 2018-08-01 RX ORDER — IOPAMIDOL 612 MG/ML
100 INJECTION, SOLUTION INTRAVASCULAR ONCE
Status: COMPLETED | OUTPATIENT
Start: 2018-08-01 | End: 2018-08-01

## 2018-08-01 RX ORDER — HEPARIN SODIUM (PORCINE) LOCK FLUSH IV SOLN 100 UNIT/ML 100 UNIT/ML
5 SOLUTION INTRAVENOUS
Status: DISCONTINUED | OUTPATIENT
Start: 2018-08-01 | End: 2018-08-02 | Stop reason: HOSPADM

## 2018-08-01 RX ORDER — HEPARIN SODIUM (PORCINE) LOCK FLUSH IV SOLN 100 UNIT/ML 100 UNIT/ML
SOLUTION INTRAVENOUS
Status: DISPENSED
Start: 2018-08-01 | End: 2018-08-02

## 2018-08-01 RX ORDER — LIDOCAINE 40 MG/G
CREAM TOPICAL
Status: DISCONTINUED | OUTPATIENT
Start: 2018-08-01 | End: 2018-08-02 | Stop reason: HOSPADM

## 2018-08-01 RX ADMIN — IOPAMIDOL 100 ML: 612 INJECTION, SOLUTION INTRAVENOUS at 12:35

## 2018-08-01 RX ADMIN — DIATRIZOATE MEGLUMINE AND DIATRIZOATE SODIUM 30 ML: 660; 100 SOLUTION ORAL; RECTAL at 12:35

## 2018-08-01 RX ADMIN — SODIUM CHLORIDE, PRESERVATIVE FREE 5 ML: 5 INJECTION INTRAVENOUS at 12:46

## 2018-08-09 NOTE — H&P (VIEW-ONLY)
Surgery Consult Clinic Note      RE: Sola Gan  : 1950    Chief Complaint:  colonoscopy    History of Present Illness:  Mrs. aGn is a very pleasant 67 year old female who I am seeing at the request of Dr. Alex Tello MD for evaluation of screening colon malignant neoplasm and consideration for colonoscopy.  She's known to me from a colonoscopy in 2017 which a cancer was found in her sigmoid colon.  This was resected by Dr. Willams with the beto.  3 of 18 lymph nodes were positive.  She underwent 3 rounds of chemotherapy but had to stop because of peripheral neuropathy.  She specifically denies fever, chills, nausea, vomiting, chest pain, shortness of breath or palpitations.      Medical history:  Past Medical History:   Diagnosis Date     COPD (chronic obstructive pulmonary disease) (H)      Diabetes (H)      H/O sleep apnea     tonsils, adnoids, and uvula removed     Uncomplicated asthma        Surgical history:  Past Surgical History:   Procedure Laterality Date     APPENDECTOMY       CHOLECYSTECTOMY       COLONOSCOPY N/A 2017    Procedure: COLONOSCOPY;  COLONOSCOPY WITH POLYPECTOMY, BIOPSY, ENDOSCOPIC MARKER TATOOING;  Surgeon: Rigo Coreas DO;  Location: [facility]     ENT SURGERY      for MORA     GYN SURGERY       HERNIA REPAIR       INSERT PORT VASCULAR ACCESS N/A 2017    Procedure: INSERT PORT VASCULAR ACCESS;  PORT-A-CATH PLACEMENT LEFT INTERNAL JUGULAR;  Surgeon: Awais Ding MD;  Location: [facility]       Family history:  Family History   Problem Relation Age of Onset     Other Cancer Mother      ovarian      Breast Cancer Mother      Bronchitis Father      Peripheral Neuropathy Father      Hypertension Father        Medications:  Prior to Admission medications    Medication Sig Start Date End Date Taking? Authorizing Provider   aspirin 81 MG EC tablet Take 1 tablet (81 mg) by mouth daily 17  Yes Favian Recinos MD   atorvastatin (LIPITOR) 40 MG tablet  TAKE 1 TABLET(40 MG) BY MOUTH DAILY 3/26/18  Yes Favian Recinos MD   blood glucose monitoring (ACCU-CHEK YOLANDA PLUS) meter device kit Use to test blood sugar 2 times daily  . 11/17/17  Yes Lisy Radford NP   blood glucose monitoring (ACCU-CHEK YOLANDA PLUS) test strip Use to test blood sugar 2 times daily. 11/17/17  Yes Favian Recinos MD   blood glucose monitoring (ACCU-CHEK FASTCLIX) lancets Use to test blood sugar 2 times daily. 11/17/17  Yes Favian Recinos MD   COMBIVENT RESPIMAT  MCG/ACT inhaler SEE NOTES 7/11/18  Yes Favian Recinos MD   DiphenhydrAMINE HCl (BENADRYL PO) Take 25 mg by mouth nightly as needed for sleep   Yes Unknown, Entered By History   ePHEDrine-GuaiFENesin (PRIMATENE ASTHMA) 12.5-200 MG TABS Take 1 tablet by mouth At Bedtime   Yes Unknown, Entered By History   FLUoxetine (PROZAC) 20 MG capsule TAKE 1 CAPSULE(20 MG) BY MOUTH DAILY 5/17/18  Yes Favian Recinos MD   Ibuprofen (ADVIL PO) Take 600-800 mg by mouth every 8 hours as needed for moderate pain    Yes Reported, Patient   lisinopril (PRINIVIL/ZESTRIL) 10 MG tablet TAKE 1 TABLET(10 MG) BY MOUTH DAILY 3/16/18  Yes Favian Recinos MD   Loperamide HCl (IMODIUM A-D PO) Take 1 tablet by mouth 2 times daily   Yes Reported, Patient   loratadine-pseudoePHEDrine (CLARITIN-D 24-HOUR)  MG per 24 hr tablet Take 1 tablet by mouth daily   Yes Reported, Patient   metFORMIN (GLUCOPHAGE) 500 MG tablet TAKE ONE TABLET BY MOUTH THREE TIMES DAILY WITH MEALS 7/30/18  Yes Favian Recinos MD   METFORMIN HCL PO Take 500 mg by mouth 3 times daily (with meals)    Yes Unknown, Entered By History   Multiple Vitamins-Minerals (MULTIVITAMIN GUMMIES ADULT PO) Take 2 chew tab by mouth daily   Yes Unknown, Entered By History   ondansetron (ZOFRAN) 4 MG tablet Take 1 tablet (4 mg) by mouth every 6 hours as needed for nausea 9/8/17  Yes Awais Ding MD   pantoprazole (PROTONIX) 40 MG EC tablet TAKE 1 TABLET(40 MG) BY MOUTH TWICE  "DAILY BEFORE MEALS 4/12/18  Yes Favian Recinos MD   PROAIR  (90 Base) MCG/ACT inhaler INHALE 2 PUFFS BY MOUTH EVERY 4 HOURS AS NEEDED FOR SHORTNESS OF BREATH 7/16/18  Yes Favian Recinos MD   Saccharomyces boulardii (PROBIOTIC) 250 MG CAPS Take 1 capsule by mouth 2 times daily 11/2/17  Yes Favian Recinos MD       Allergies:  The patientis allergic to acyclovir and sulfa drugs.  .  Social history:  Social History   Substance Use Topics     Smoking status: Current Every Day Smoker     Packs/day: 0.75     Years: 20.00     Smokeless tobacco: Never Used     Alcohol use No     Marital status: single.    Review of Systems:    Constitutional: Negative for fever, chills and weight loss.   HENT: Negative for ear pain, nosebleeds, congestion, sore throat, tinnitus and ear discharge.    Eyes: Negative for blurred vision, double vision, photophobia and pain.   Respiratory: Negative for cough, hemoptysis, shortness of breath, wheezing and stridor.    Cardiovascular: Negative for chest pain, palpitations and orthopnea.   Gastrointestinal: Negative for heartburn, nausea, vomiting, abdominal pain and blood in stool.   Genitourinary: Negative for urgency, frequency and hematuria.   Musculoskeletal: Negative for myalgias, back pain and joint pain.   Neurological: Negative for tingling, speech change and headaches.   Endo/Heme/Allergies: Does not bruise/bleed easily.   Psychiatric/Behavioral: Negative for depression, suicidal ideas and hallucinations. The patient is not nervous/anxious.    Physical Examination:  /68  Pulse 99  Temp 99.4  F (37.4  C) (Tympanic)  Resp 20  Ht 5' 1\" (1.549 m)  Wt 220 lb (99.8 kg)  SpO2 96%  BMI 41.57 kg/m2  General: AAOx4, NAD, WN/WD, ambulating without assistance  HEENT:NCAT, EOMI, PERRL Sclerae anicteric; Trachea mideline, no JVD  Chest:   Clear to auscultation bilaterally.  Cardiac: S1S2 , regular rate and rhythm without additional sounds  Abdomen: Obese, soft, ND/NT no " rebound, no guarding  Extremities: Cursory exam unremarkable.  Skin: Warm, dry, < 2 sec cap refill  Neuro: CN 2-12 grossly intact, no focal deficit, GCS 15  Psych: happy, calm, asks appropriate questions    # Pain Assessment:  Current Pain Score 7/31/18   Patient currently in pain? no   Pain score (0-10)    Pain location    Pain descriptors    Sola reed pain level was assessed and she currently denies pain.        Assessment/Plan:  #1 Sigmoid colon cancer  #2 Screening colon malignant neoplasm  #3 COPD  #4 Morbid obesity  #5 DM II without insulin  #6 tobacco dependence     Thank you for the consult.  Mrs. Gan and REYES had a long and sissy discussion about colonoscopies.  The indications, risks, benefits, althernatives and technical aspects of whole colon colonoscopy were outlined with risks including, but not limited to, perforation, bleeding and inability to visualize entire colon.  Management of each was reviewed including the risk for life saving surgery and possible admittance to the ICU.  The need of mechanical preparation of the colon was reviewed along with the use of monitored anesthetic care which is needed to ensure proper visualization and safety concerns should biopsy be needed.  The patient's questions were asked and answered.  Scheduled first available date.      Dr Coreas  [facility]  [address], [address]  [place]    [address]    Referring Provider:  Alex Tello MD  [place] [address] [place] [id number]     Primary Care Provider:  Favian Recinos

## 2018-08-16 ENCOUNTER — ANESTHESIA (OUTPATIENT)
Dept: SURGERY | Facility: HOSPITAL | Age: 68
End: 2018-08-16
Payer: MEDICARE

## 2018-08-16 ENCOUNTER — HOSPITAL ENCOUNTER (OUTPATIENT)
Facility: HOSPITAL | Age: 68
Discharge: HOME | End: 2018-08-16
Attending: SURGERY | Admitting: SURGERY
Payer: MEDICARE

## 2018-08-16 ENCOUNTER — ANESTHESIA EVENT (OUTPATIENT)
Dept: SURGERY | Facility: HOSPITAL | Age: 68
End: 2018-08-16
Payer: MEDICARE

## 2018-08-16 ENCOUNTER — SURGERY (OUTPATIENT)
Age: 68
End: 2018-08-16

## 2018-08-16 VITALS
SYSTOLIC BLOOD PRESSURE: 142 MMHG | OXYGEN SATURATION: 96 % | RESPIRATION RATE: 18 BRPM | TEMPERATURE: 97.7 F | DIASTOLIC BLOOD PRESSURE: 87 MMHG

## 2018-08-16 PROCEDURE — 71000027 ZZH RECOVERY PHASE 2 EACH 15 MINS: Performed by: SURGERY

## 2018-08-16 PROCEDURE — 36000052 ZZH SURGERY LEVEL 2 EA 15 ADDTL MIN: Performed by: SURGERY

## 2018-08-16 PROCEDURE — 36000050 ZZH SURGERY LEVEL 2 1ST 30 MIN: Performed by: SURGERY

## 2018-08-16 PROCEDURE — 88305 TISSUE EXAM BY PATHOLOGIST: CPT | Mod: TC | Performed by: SURGERY

## 2018-08-16 PROCEDURE — 45380 COLONOSCOPY AND BIOPSY: CPT | Performed by: NURSE ANESTHETIST, CERTIFIED REGISTERED

## 2018-08-16 PROCEDURE — 25000125 ZZHC RX 250: Performed by: NURSE ANESTHETIST, CERTIFIED REGISTERED

## 2018-08-16 PROCEDURE — 45380 COLONOSCOPY AND BIOPSY: CPT | Performed by: ANESTHESIOLOGY

## 2018-08-16 PROCEDURE — 37000008 ZZH ANESTHESIA TECHNICAL FEE, 1ST 30 MIN: Performed by: SURGERY

## 2018-08-16 PROCEDURE — 25000128 H RX IP 250 OP 636: Performed by: ANESTHESIOLOGY

## 2018-08-16 PROCEDURE — 27210794 ZZH OR GENERAL SUPPLY STERILE: Performed by: SURGERY

## 2018-08-16 PROCEDURE — 45380 COLONOSCOPY AND BIOPSY: CPT | Mod: PT | Performed by: SURGERY

## 2018-08-16 PROCEDURE — 40000306 ZZH STATISTIC PRE PROC ASSESS II: Performed by: SURGERY

## 2018-08-16 PROCEDURE — 37000009 ZZH ANESTHESIA TECHNICAL FEE, EACH ADDTL 15 MIN: Performed by: SURGERY

## 2018-08-16 PROCEDURE — 25000128 H RX IP 250 OP 636: Performed by: NURSE ANESTHETIST, CERTIFIED REGISTERED

## 2018-08-16 RX ORDER — LIDOCAINE 40 MG/G
CREAM TOPICAL
Status: DISCONTINUED | OUTPATIENT
Start: 2018-08-16 | End: 2018-08-16 | Stop reason: HOSPADM

## 2018-08-16 RX ORDER — FENTANYL CITRATE 50 UG/ML
25-50 INJECTION, SOLUTION INTRAMUSCULAR; INTRAVENOUS
Status: DISCONTINUED | OUTPATIENT
Start: 2018-08-16 | End: 2018-08-16 | Stop reason: HOSPADM

## 2018-08-16 RX ORDER — SODIUM CHLORIDE, SODIUM LACTATE, POTASSIUM CHLORIDE, CALCIUM CHLORIDE 600; 310; 30; 20 MG/100ML; MG/100ML; MG/100ML; MG/100ML
INJECTION, SOLUTION INTRAVENOUS CONTINUOUS
Status: DISCONTINUED | OUTPATIENT
Start: 2018-08-16 | End: 2018-08-16 | Stop reason: HOSPADM

## 2018-08-16 RX ORDER — HEPARIN SODIUM,PORCINE 10 UNIT/ML
5-10 VIAL (ML) INTRAVENOUS
Status: DISCONTINUED | OUTPATIENT
Start: 2018-08-16 | End: 2018-08-16 | Stop reason: HOSPADM

## 2018-08-16 RX ORDER — MEPERIDINE HYDROCHLORIDE 50 MG/ML
12.5 INJECTION INTRAMUSCULAR; INTRAVENOUS; SUBCUTANEOUS
Status: DISCONTINUED | OUTPATIENT
Start: 2018-08-16 | End: 2018-08-16 | Stop reason: HOSPADM

## 2018-08-16 RX ORDER — ONDANSETRON 4 MG/1
4 TABLET, ORALLY DISINTEGRATING ORAL EVERY 30 MIN PRN
Status: DISCONTINUED | OUTPATIENT
Start: 2018-08-16 | End: 2018-08-16 | Stop reason: HOSPADM

## 2018-08-16 RX ORDER — PROPOFOL 10 MG/ML
INJECTION, EMULSION INTRAVENOUS PRN
Status: DISCONTINUED | OUTPATIENT
Start: 2018-08-16 | End: 2018-08-16

## 2018-08-16 RX ORDER — HEPARIN SODIUM,PORCINE 10 UNIT/ML
5-10 VIAL (ML) INTRAVENOUS EVERY 24 HOURS
Status: DISCONTINUED | OUTPATIENT
Start: 2018-08-16 | End: 2018-08-16 | Stop reason: HOSPADM

## 2018-08-16 RX ORDER — DEXAMETHASONE SODIUM PHOSPHATE 4 MG/ML
4 INJECTION, SOLUTION INTRA-ARTICULAR; INTRALESIONAL; INTRAMUSCULAR; INTRAVENOUS; SOFT TISSUE EVERY 10 MIN PRN
Status: DISCONTINUED | OUTPATIENT
Start: 2018-08-16 | End: 2018-08-16 | Stop reason: HOSPADM

## 2018-08-16 RX ORDER — HYDRALAZINE HYDROCHLORIDE 20 MG/ML
2.5-5 INJECTION INTRAMUSCULAR; INTRAVENOUS EVERY 10 MIN PRN
Status: DISCONTINUED | OUTPATIENT
Start: 2018-08-16 | End: 2018-08-16 | Stop reason: HOSPADM

## 2018-08-16 RX ORDER — ONDANSETRON 2 MG/ML
4 INJECTION INTRAMUSCULAR; INTRAVENOUS EVERY 30 MIN PRN
Status: DISCONTINUED | OUTPATIENT
Start: 2018-08-16 | End: 2018-08-16 | Stop reason: HOSPADM

## 2018-08-16 RX ORDER — HEPARIN SODIUM (PORCINE) LOCK FLUSH IV SOLN 100 UNIT/ML 100 UNIT/ML
5 SOLUTION INTRAVENOUS
Status: DISCONTINUED | OUTPATIENT
Start: 2018-08-16 | End: 2018-08-16 | Stop reason: HOSPADM

## 2018-08-16 RX ORDER — ALBUTEROL SULFATE 0.83 MG/ML
2.5 SOLUTION RESPIRATORY (INHALATION) EVERY 4 HOURS PRN
Status: DISCONTINUED | OUTPATIENT
Start: 2018-08-16 | End: 2018-08-16 | Stop reason: HOSPADM

## 2018-08-16 RX ORDER — LIDOCAINE HYDROCHLORIDE 20 MG/ML
INJECTION, SOLUTION INFILTRATION; PERINEURAL PRN
Status: DISCONTINUED | OUTPATIENT
Start: 2018-08-16 | End: 2018-08-16

## 2018-08-16 RX ORDER — NALOXONE HYDROCHLORIDE 0.4 MG/ML
.1-.4 INJECTION, SOLUTION INTRAMUSCULAR; INTRAVENOUS; SUBCUTANEOUS
Status: DISCONTINUED | OUTPATIENT
Start: 2018-08-16 | End: 2018-08-16 | Stop reason: HOSPADM

## 2018-08-16 RX ADMIN — PROPOFOL 50 MG: 10 INJECTION, EMULSION INTRAVENOUS at 09:52

## 2018-08-16 RX ADMIN — PROPOFOL 20 MG: 10 INJECTION, EMULSION INTRAVENOUS at 09:46

## 2018-08-16 RX ADMIN — PROPOFOL 50 MG: 10 INJECTION, EMULSION INTRAVENOUS at 09:39

## 2018-08-16 RX ADMIN — PROPOFOL 20 MG: 10 INJECTION, EMULSION INTRAVENOUS at 10:04

## 2018-08-16 RX ADMIN — PROPOFOL 20 MG: 10 INJECTION, EMULSION INTRAVENOUS at 10:00

## 2018-08-16 RX ADMIN — LIDOCAINE HYDROCHLORIDE 40 MG: 20 INJECTION, SOLUTION INFILTRATION; PERINEURAL at 09:39

## 2018-08-16 RX ADMIN — PROPOFOL 20 MG: 10 INJECTION, EMULSION INTRAVENOUS at 09:55

## 2018-08-16 RX ADMIN — PROPOFOL 50 MG: 10 INJECTION, EMULSION INTRAVENOUS at 09:44

## 2018-08-16 RX ADMIN — PROPOFOL 50 MG: 10 INJECTION, EMULSION INTRAVENOUS at 09:49

## 2018-08-16 RX ADMIN — SODIUM CHLORIDE, POTASSIUM CHLORIDE, SODIUM LACTATE AND CALCIUM CHLORIDE: 600; 310; 30; 20 INJECTION, SOLUTION INTRAVENOUS at 09:19

## 2018-08-16 RX ADMIN — SODIUM CHLORIDE, PRESERVATIVE FREE 5 ML: 5 INJECTION INTRAVENOUS at 10:50

## 2018-08-16 ASSESSMENT — COPD QUESTIONNAIRES: COPD: 1

## 2018-08-16 ASSESSMENT — LIFESTYLE VARIABLES: TOBACCO_USE: 1

## 2018-08-16 NOTE — BRIEF OP NOTE
[facility] - Brief Operative Note    Pre-operative diagnosis: History of sigmoid adenocarcinoma   Post-operative diagnosis Hemorrhoids, melanosis coli, history of colon cancer   Procedure: colonoscopy   Surgeon: Rigo Coreas DO   Anesthesia: Monitor Anesthesia Care    Estimated blood loss: 0   Blood transfusion: No transfusion was given during surgery   Drains: 0   Specimens: Colon anastamosis   Findings: Mixed hemorrhoids, melanosis coli   Complications: None   Condition: Stable   Comments: Details included in dictated operative note.

## 2018-08-16 NOTE — IP AVS SNAPSHOT
[facility]    [address] [address]    Phone:  [phone]                                       After Visit Summary   8/16/2018    Sola Gan    MRN: [id number]           After Visit Summary Signature Page     I have received my discharge instructions, and my questions have been answered. I have discussed any challenges I see with this plan with the nurse or doctor.    ..........................................................................................................................................  Patient/Patient Representative Signature      ..........................................................................................................................................  Patient Representative Print Name and Relationship to Patient    ..................................................               ................................................  Date                                            Time    ..........................................................................................................................................  Reviewed by Signature/Title    ...................................................              ..............................................  Date                                                            Time

## 2018-08-16 NOTE — ANESTHESIA POSTPROCEDURE EVALUATION
Patient: Sola Gan    Procedure(s):  COLONOSCOPY with biopsy - Wound Class: II-Clean Contaminated    Diagnosis:SCREENING  Diagnosis Additional Information: No value filed.    Anesthesia Type:  MAC    Note:  Anesthesia Post Evaluation    Patient location during evaluation: Phase 2 and Bedside  Patient participation: Able to fully participate in evaluation  Level of consciousness: awake and alert  Pain management: adequate  Airway patency: patent  Cardiovascular status: acceptable  Respiratory status: acceptable  Hydration status: stable  PONV: none     Anesthetic complications: None          Last vitals:  Vitals:    08/16/18 0900 08/16/18 0905   BP: 150/91    Resp: 16    Temp:  97.4  F (36.3  C)   SpO2:  96%         Electronically Signed By: Dmitry Day MD  August 16, 2018  10:18 AM

## 2018-08-16 NOTE — PROCEDURES
Procedure Date: 2018      PREOPERATIVE DIAGNOSIS:  History of sigmoid colon adenocarcinoma.      POSTOPERATIVE DIAGNOSIS:  Hemorrhoids, melanosis coli, history of colon cancer.      PROCEDURE:  Colonoscopy.      INDICATION:  Screening colonoscopy.      SURGEON:  Madina Coreas DO      DESCRIPTION OF PROCEDURE:  The patient was brought into the endoscopy suite and placed in the left lateral decubitus position.  After preprocedural pause and attended monitored anesthesia was administered, the external anus was inspected and was normal.  Digital rectal exam was positive for hemorrhoids.  The colonoscope was inserted and advanced under direct visualization to the level of the cecum which was identified by the appendiceal orifice and the ileocecal valve.  The prep was excellent.  Upon slow withdrawal of the colonoscope, approximately 95% the colon mucosa was directly visualized.  The cecum, ascending, transverse and descending colon were all unremarkable.  There was no evidence of polyps, diverticula, inflammation, ulceration, bleeding or AVMs.  The anastomosis appeared well intact with no evidence of recurrent masses.  However, given the low hyperemia in the area, this area was biopsied with cold biting forceps.  The rectum was unremarkable.  Retroflexion in the rectum was positive for mixture of internal and external hemorrhoids.  The extra air was removed from the colon and the colonoscope withdrawn.  The patient tolerated the procedure well and was taken to postanesthesia care unit.  Timing for interval colonoscopy will depend on histologic evaluation of the biopsies, keeping in mind recent diagnosis of colon cancer.         MADINA COREAS DO             D: 2018   T: 2018   MT: NTS      Name:     MONA MCKEE   MRN:      [id number]        Account:        [id number]   :      1950           Procedure Date: 2018      Document: [id number]

## 2018-08-16 NOTE — DISCHARGE INSTRUCTIONS
INSTRUCTIONS AFTER COLONOSCOPY    WHEN YOU ARE BACK HOME:    Plan to rest for an hour or two after you get home.    You may have some cramping or pressure until you pass gas.    You may resume your regular medications.    Eat a small, light meal at first, and then gradually return to normal meal sizes.  If you had a polyp removed:    Slight bleeding may occur.  You may have a slight blood stain on the toilet paper after a bowel movement.    To lessen the chance of bleeding, avoid heavy exercise for ONE WEEK.  This includes heavy lifting, vigorous sport activities, and heavy physical labor.  You may resume your normal sexual activity.      Avoid aspirin or aspirin products if instructed by your doctor.    WHAT TO WATCH FOR:  Problems rarely occur after the exam; however, it is important for you to watch for early signs of possible problems.  If you have     Unusual pain in your abdomen    Nausea and vomiting that persists    Excessive bleeding    Black or bloody bowel movements    Fever or temperature above 100.6 F  Please call your doctor ([facility] [phone]) or go to the nearest hospital emergency room.    Post-Anesthesia Patient Instructions    IMMEDIATELY FOLLOWING SURGERY:  Do not drive or operate machinery for the first twenty four hours after surgery.  Do not make any important decisions for twenty four hours after surgery or while taking narcotic pain medications or sedatives.  If you develop intractable nausea and vomiting or a severe headache please notify your doctor immediately.    FOLLOW-UP:  Please make an appointment with your surgeon as instructed. You do not need to follow up with anesthesia unless specifically instructed to do so.    WOUND CARE INSTRUCTIONS (if applicable):  Keep a dry clean dressing on the anesthesia/puncture wound site if there is drainage.  Once the wound has quit draining you may leave it open to air.  Generally you should leave the bandage intact for twenty four  hours unless there is drainage.  If the epidural site drains for more than 36-48 hours please call the anesthesia department.    QUESTIONS?:  Please feel free to call your physician or the hospital  if you have any questions, and they will be happy to assist you.

## 2018-08-16 NOTE — ANESTHESIA PREPROCEDURE EVALUATION
Anesthesia Evaluation     . Pt has had prior anesthetic.     No history of anesthetic complications          ROS/MED HX    ENT/Pulmonary:     (+)sleep apnea, MORA risk factors (BMI: 41.66) hypertension, obese, allergic rhinitis, other ENT- s/p UPPP, tobacco use, Current use 0.75 packs/day  asthma COPD, doesn't use CPAP , . .    Neurologic:  - neg neurologic ROS     Cardiovascular:     (+) Dyslipidemia, hypertension-range: not on beta blocker; 122/68, ---. : . . . :. . Previous cardiac testing date:results:Stress Testdate:10/26/2017 results:The calculated ejection fraction was 73%. No wall motion abnormalities are noted.  IMPRESSION:   1. No evidence of myocardial ischemia.  2. Normal left ventricular function.ECG reviewed date:7/31/2018 results:NSR@ 75 w/ sinus arrhythmia, OWN date: results:          METS/Exercise Tolerance:     Hematologic:         Musculoskeletal:   (+) arthritis, , , -       GI/Hepatic:     (+) bowel prep, Other GI/Hepatic ADENOCARCINOMA OF SIGMOID COLON s/p robotic sigmoid colectomy 7/27/2017       Renal/Genitourinary:         Endo:     (+) type II DM Last HgA1c: 5.8 date: 5/3/2018 Obesity, .      Psychiatric:  - neg psychiatric ROS       Infectious Disease:  - neg infectious disease ROS       Malignancy:   (+) Malignancy History of GI  GI CA status post Surgery and Chemo,         Other:    - neg other ROS                 Physical Exam      Airway   Mallampati: II  TM distance: >3 FB  Neck ROM: full    Dental   (+) missing    Cardiovascular   Rhythm and rate: regular and normal      Pulmonary    breath sounds clear to auscultation                    Anesthesia Plan      History & Physical Review  History and physical reviewed and following examination; no interval change.    ASA Status:  3 .    NPO Status:  > 8 hours    Plan for MAC with Intravenous and Propofol induction. Maintenance will be TIVA.  Reason for MAC:  Chronic cardiopulmonary disease (G9) and Other - see comments  PONV  prophylaxis:  Ondansetron (or other 5HT-3)  Surgeon requests deep sedation. Patient is an ASA 3 and has a BMI >40. Will provide MAC.      Postoperative Care  Postoperative pain management:  IV analgesics.      Consents  Anesthetic plan, risks, benefits and alternatives discussed with:  Patient..                          .

## 2018-08-16 NOTE — IP AVS SNAPSHOT
MRN:[id number]                      After Visit Summary   8/16/2018    Sola Gan    MRN: [id number]           Thank you!     Thank you for choosing [facility] for your care. Our goal is always to provide you with excellent care. Hearing back from our patients is one way we can continue to improve our services. Please take a few minutes to complete the written survey that you may receive in the mail after you visit with us. Thank you!        Patient Information     Date Of Birth          1950        About your hospital stay     You were admitted on:  August 16, 2018 You last received care in the:  [facility] Preop/Phase II    You were discharged on:  August 16, 2018       Who to Call     For medical emergencies, please call 911.  For non-urgent questions about your medical care, please call your primary care provider or clinic, [phone]  For questions related to your surgery, please call your surgery clinic        Attending Provider     Provider Specialty    Rigo Coreas, DO Surgery       Primary Care Provider Office Phone # Fax #    Favian Recinos [phone]      Further instructions from your care team           INSTRUCTIONS AFTER COLONOSCOPY    WHEN YOU ARE BACK HOME:    Plan to rest for an hour or two after you get home.    You may have some cramping or pressure until you pass gas.    You may resume your regular medications.    Eat a small, light meal at first, and then gradually return to normal meal sizes.  If you had a polyp removed:    Slight bleeding may occur.  You may have a slight blood stain on the toilet paper after a bowel movement.    To lessen the chance of bleeding, avoid heavy exercise for ONE WEEK.  This includes heavy lifting, vigorous sport activities, and heavy physical labor.  You may resume your normal sexual activity.      Avoid aspirin or aspirin products if instructed by your doctor.    WHAT TO WATCH FOR:  Problems rarely occur after the  exam; however, it is important for you to watch for early signs of possible problems.  If you have     Unusual pain in your abdomen    Nausea and vomiting that persists    Excessive bleeding    Black or bloody bowel movements    Fever or temperature above 100.6 F  Please call your doctor ([facility] [phone]) or go to the nearest hospital emergency room.    Post-Anesthesia Patient Instructions    IMMEDIATELY FOLLOWING SURGERY:  Do not drive or operate machinery for the first twenty four hours after surgery.  Do not make any important decisions for twenty four hours after surgery or while taking narcotic pain medications or sedatives.  If you develop intractable nausea and vomiting or a severe headache please notify your doctor immediately.    FOLLOW-UP:  Please make an appointment with your surgeon as instructed. You do not need to follow up with anesthesia unless specifically instructed to do so.    WOUND CARE INSTRUCTIONS (if applicable):  Keep a dry clean dressing on the anesthesia/puncture wound site if there is drainage.  Once the wound has quit draining you may leave it open to air.  Generally you should leave the bandage intact for twenty four hours unless there is drainage.  If the epidural site drains for more than 36-48 hours please call the anesthesia department.    QUESTIONS?:  Please feel free to call your physician or the hospital  if you have any questions, and they will be happy to assist you.       Pending Results     No orders found from 8/14/2018 to 8/17/2018.            Admission Information     Date & Time Provider Department Dept. Phone    8/16/2018 Rigo Coreas,  [facility] Preop/Phase [phone]      Your Vitals Were     Blood Pressure Temperature Respirations Pulse Oximetry          150/91 97.4  F (36.3  C) (Oral) 16 99%        [facility] Information     [facility] gives you secure access to your electronic health record. If you see a primary care provider, you can also send  messages to your care team and make appointments. If you have questions, please call your primary care clinic.  If you do not have a primary care provider, please call [phone] and they will assist you.        Care EveryWhere ID     This is your Care EveryWhere ID. This could be used by other organizations to access your [facility] medical records  [id number]        Equal Access to Services     LUNA WATKINS : Hadii nicol Mir, waduglasda luestuardoadaha, qaybta kaalmada neeru, madai farleyhaseebignacio keller . So [facility] [phone].    ATENCIÓN: Si habla español, tiene a sanchez disposición servicios gratuitos de asistencia lingüística. Ariname al [phone].    We comply with applicable federal civil rights laws and [place] laws. We do not discriminate on the basis of race, color, national origin, age, disability, sex, sexual orientation, or gender identity.               Review of your medicines      CONTINUE these medicines which have NOT CHANGED        Dose / Directions    ADVIL PO        Dose:  600-800 mg   Take 600-800 mg by mouth every 8 hours as needed for moderate pain   Refills:  0       aspirin 81 MG EC tablet   Used for:  Controlled type 2 diabetes mellitus without complication, without long-term current use of insulin (H)        Dose:  81 mg   Take 1 tablet (81 mg) by mouth daily   Quantity:  90 tablet   Refills:  3       atorvastatin 40 MG tablet   Commonly known as:  LIPITOR   Used for:  Controlled type 2 diabetes mellitus without complication, without long-term current use of insulin (H)        TAKE 1 TABLET(40 MG) BY MOUTH DAILY   Quantity:  90 tablet   Refills:  0       BENADRYL PO        Dose:  25 mg   Take 25 mg by mouth nightly as needed for sleep   Refills:  0       blood glucose monitoring lancets   Used for:  Type 2 diabetes mellitus with hyperglycemia, without long-term current use of insulin (H)        Use to test blood sugar 2 times daily.   Quantity:  102 each   Refills:   10       blood glucose monitoring meter device kit   Used for:  Type 2 diabetes mellitus with hyperglycemia, without long-term current use of insulin (H)        Use to test blood sugar 2 times daily .   Quantity:  1 kit   Refills:  0       blood glucose monitoring test strip   Commonly known as:  ACCU-CHEK YOLANDA PLUS   Used for:  Type 2 diabetes mellitus with hyperglycemia, without long-term current use of insulin (H)        Use to test blood sugar 2 times daily.   Quantity:  100 each   Refills:  10       COMBIVENT RESPIMAT  MCG/ACT inhaler   Used for:  Other emphysema (H)   Generic drug:  Ipratropium-Albuterol        SEE NOTES   Quantity:  4 g   Refills:  0       FLUoxetine 20 MG capsule   Commonly known as:  PROzac   Used for:  Other depression        TAKE 1 CAPSULE(20 MG) BY MOUTH DAILY   Quantity:  90 capsule   Refills:  0       IMODIUM A-D PO        Dose:  1 tablet   Take 1 tablet by mouth 2 times daily   Refills:  0       lisinopril 10 MG tablet   Commonly known as:  PRINIVIL/ZESTRIL   Used for:  Benign essential hypertension        TAKE 1 TABLET(10 MG) BY MOUTH DAILY   Quantity:  30 tablet   Refills:  5       loratadine-pseudoePHEDrine  MG per 24 hr tablet   Commonly known as:  CLARITIN-D 24-hour        Dose:  1 tablet   Take 1 tablet by mouth daily   Refills:  0       * METFORMIN HCL PO        Dose:  500 mg   Take 500 mg by mouth 3 times daily (with meals)   Refills:  0       * metFORMIN 500 MG tablet   Commonly known as:  GLUCOPHAGE   Used for:  Type 2 diabetes mellitus without complication, without long-term current use of insulin (H)        TAKE ONE TABLET BY MOUTH THREE TIMES DAILY WITH MEALS   Quantity:  90 tablet   Refills:  0       MULTIVITAMIN GUMMIES ADULT PO        Dose:  2 chew tab   Take 2 chew tab by mouth daily   Refills:  0       Na Sulfate-K Sulfate-Mg Sulf solution   Commonly known as:  SUPREP BOWEL PREP   Used for:  Cancer of sigmoid colon (H)        Dose:  1 Bottle   Take 177  mLs (1 Bottle) by mouth See Admin Instructions   Quantity:  2 Bottle   Refills:  0       ondansetron 4 MG tablet   Commonly known as:  ZOFRAN   Used for:  Cancer of sigmoid colon (H)        Dose:  4 mg   Take 1 tablet (4 mg) by mouth every 6 hours as needed for nausea   Quantity:  10 tablet   Refills:  0       pantoprazole 40 MG EC tablet   Commonly known as:  PROTONIX   Used for:  Abdominal pain, epigastric        TAKE 1 TABLET(40 MG) BY MOUTH TWICE DAILY BEFORE MEALS   Quantity:  60 tablet   Refills:  4       PRIMATENE ASTHMA 12.5-200 MG Tabs   Generic drug:  ePHEDrine-GuaiFENesin        Dose:  1 tablet   Take 1 tablet by mouth At Bedtime   Refills:  0       PROAIR  (90 Base) MCG/ACT inhaler   Used for:  Shortness of breath   Generic drug:  albuterol        INHALE 2 PUFFS BY MOUTH EVERY 4 HOURS AS NEEDED FOR SHORTNESS OF BREATH   Quantity:  8.5 g   Refills:  1       Probiotic 250 MG Caps   Used for:  Gastritis, presence of bleeding unspecified, unspecified chronicity, unspecified gastritis type        Dose:  1 capsule   Take 1 capsule by mouth 2 times daily   Quantity:  180 capsule   Refills:  3       * Notice:  This list has 2 medication(s) that are the same as other medications prescribed for you. Read the directions carefully, and ask your doctor or other care provider to review them with you.             Protect others around you: Learn how to safely use, store and throw away your medicines at www.disposemymeds.org.             Medication List: This is a list of all your medications and when to take them. Check marks below indicate your daily home schedule. Keep this list as a reference.      Medications           Morning Afternoon Evening Bedtime As Needed    ADVIL PO   Take 600-800 mg by mouth every 8 hours as needed for moderate pain                                aspirin 81 MG EC tablet   Take 1 tablet (81 mg) by mouth daily                                atorvastatin 40 MG tablet   Commonly known  as:  LIPITOR   TAKE 1 TABLET(40 MG) BY MOUTH DAILY                                BENADRYL PO   Take 25 mg by mouth nightly as needed for sleep                                blood glucose monitoring lancets   Use to test blood sugar 2 times daily.                                blood glucose monitoring meter device kit   Use to test blood sugar 2 times daily .                                blood glucose monitoring test strip   Commonly known as:  ACCU-CHEK YOLANDA PLUS   Use to test blood sugar 2 times daily.                                COMBIVENT RESPIMAT  MCG/ACT inhaler   SEE NOTES   Generic drug:  Ipratropium-Albuterol                                FLUoxetine 20 MG capsule   Commonly known as:  PROzac   TAKE 1 CAPSULE(20 MG) BY MOUTH DAILY                                IMODIUM A-D PO   Take 1 tablet by mouth 2 times daily                                lisinopril 10 MG tablet   Commonly known as:  PRINIVIL/ZESTRIL   TAKE 1 TABLET(10 MG) BY MOUTH DAILY                                loratadine-pseudoePHEDrine  MG per 24 hr tablet   Commonly known as:  CLARITIN-D 24-hour   Take 1 tablet by mouth daily                                * METFORMIN HCL PO   Take 500 mg by mouth 3 times daily (with meals)                                * metFORMIN 500 MG tablet   Commonly known as:  GLUCOPHAGE   TAKE ONE TABLET BY MOUTH THREE TIMES DAILY WITH MEALS                                MULTIVITAMIN GUMMIES ADULT PO   Take 2 chew tab by mouth daily                                Na Sulfate-K Sulfate-Mg Sulf solution   Commonly known as:  SUPREP BOWEL PREP   Take 177 mLs (1 Bottle) by mouth See Admin Instructions                                ondansetron 4 MG tablet   Commonly known as:  ZOFRAN   Take 1 tablet (4 mg) by mouth every 6 hours as needed for nausea                                pantoprazole 40 MG EC tablet   Commonly known as:  PROTONIX   TAKE 1 TABLET(40 MG) BY MOUTH TWICE DAILY BEFORE MEALS                                 PRIMATENE ASTHMA 12.5-200 MG Tabs   Take 1 tablet by mouth At Bedtime   Generic drug:  ePHEDrine-GuaiFENesin                                PROAIR  (90 Base) MCG/ACT inhaler   INHALE 2 PUFFS BY MOUTH EVERY 4 HOURS AS NEEDED FOR SHORTNESS OF BREATH   Generic drug:  albuterol                                Probiotic 250 MG Caps   Take 1 capsule by mouth 2 times daily                                * Notice:  This list has 2 medication(s) that are the same as other medications prescribed for you. Read the directions carefully, and ask your doctor or other care provider to review them with you.

## 2018-08-16 NOTE — ANESTHESIA CARE TRANSFER NOTE
Patient: Sola Gan    Procedure(s):  COLONOSCOPY with biopsy - Wound Class: II-Clean Contaminated    Diagnosis: SCREENING  Diagnosis Additional Information: No value filed.    Anesthesia Type:   MAC     Note:  Airway :Room Air  Patient transferred to:Phase II  Handoff Report: Identifed the Patient, Identified the Reponsible Provider, Reviewed the pertinent medical history, Discussed the surgical course, Reviewed Intra-OP anesthesia mangement and issues during anesthesia, Set expectations for post-procedure period and Allowed opportunity for questions and acknowledgement of understanding      Vitals: (Last set prior to Anesthesia Care Transfer)    CRNA VITALS  8/16/2018 0936 - 8/16/2018 1008      8/16/2018             Pulse: 78    Ht Rate: 78    SpO2: 95 %    Resp Rate (set): 8                Electronically Signed By: ARABELLA Locke CRNA  August 16, 2018  10:08 AM

## 2018-08-16 NOTE — OR NURSING
Pt left power port accessed with 20g x 1in needle, sterile technique used, left chest intact, no blood return noted, pt states she often does not get blood return when lying down

## 2018-08-20 LAB — COPATH REPORT: NORMAL

## 2018-09-03 DIAGNOSIS — E11.9 TYPE 2 DIABETES MELLITUS WITHOUT COMPLICATION, WITHOUT LONG-TERM CURRENT USE OF INSULIN (H): ICD-10-CM

## 2018-09-03 DIAGNOSIS — R10.13 ABDOMINAL PAIN, EPIGASTRIC: ICD-10-CM

## 2018-09-03 DIAGNOSIS — J43.8 OTHER EMPHYSEMA (H): ICD-10-CM

## 2018-09-03 DIAGNOSIS — I10 BENIGN ESSENTIAL HYPERTENSION: ICD-10-CM

## 2018-09-03 NOTE — LETTER
September 6, 2018      [facility] [id number]        Dear Sola,     APPOINTMENT REMINDER:   Our records indicates that it is time for you to be seen for your diabetic follow-up, medication review, and lab work.      Your current medication request will be approved for one refill but you will need to be seen before any additional refills can be approved. Taking care of your health is important to us, and ongoing visits with your provider are vital to your care.    We look forward to seeing you in the near future.  You may call our office at [phone] to schedule a visit.     Please disregard this notice if you have already made an appointment.        Sincerely,  Favian Recinos MD

## 2018-09-04 NOTE — TELEPHONE ENCOUNTER
METFORMIN 500MG TABLETS    Last Written Prescription Date:  7/30/18  Last Fill Quantity: 90,   # refills: 0  Last Office Visit: 5/3/18  Future Office visit:       Routing refill request to provider for review/approval because:

## 2018-09-04 NOTE — TELEPHONE ENCOUNTER
PANTOPRAZOLE 40MG TABLETS    Last Written Prescription Date:  4/12/18  Last Fill Quantity: 60,   # refills: 4  Last Office Visit: 5/3/18  Future Office visit:       Routing refill request to provider for review/approval because:

## 2018-09-04 NOTE — TELEPHONE ENCOUNTER
LISINOPRIL 10MG TABLETS    Last Written Prescription Date:  3/16/18  Last Fill Quantity: 30,   # refills: 5  Last Office Visit: 5/3/18  Future Office visit:       Routing refill request to provider for review/approval because:

## 2018-09-04 NOTE — TELEPHONE ENCOUNTER
COMBIVENT RESPIMAT ORAL 120SPRAY 4G    Last Written Prescription Date:  8/8/18  Last Fill Quantity: 4G,   # refills: 0  Last Office Visit: 5/3/18  Future Office visit:       Routing refill request to provider for review/approval because:

## 2018-09-06 RX ORDER — LISINOPRIL 10 MG/1
TABLET ORAL
Qty: 30 TABLET | Refills: 1 | Status: SHIPPED | OUTPATIENT
Start: 2018-09-06 | End: 2018-11-06

## 2018-09-06 RX ORDER — IPRATROPIUM BROMIDE AND ALBUTEROL 20; 100 UG/1; UG/1
SPRAY, METERED RESPIRATORY (INHALATION)
Qty: 4 G | Refills: 0 | Status: SHIPPED | OUTPATIENT
Start: 2018-09-06

## 2018-09-06 RX ORDER — PANTOPRAZOLE SODIUM 40 MG/1
TABLET, DELAYED RELEASE ORAL
Qty: 60 TABLET | Refills: 1 | Status: SHIPPED | OUTPATIENT
Start: 2018-09-06 | End: 2018-11-06

## 2018-09-06 NOTE — TELEPHONE ENCOUNTER
"Biguanide Agents Failed  metFORMIN (GLUCOPHAGE) 500 MG tablet [Pharmacy Med Name: METFORMIN 500MG TABLETS]       Rerun Protocol (9/4/2018 4:20 PM)        Blood pressure less than 140/90 in past 6 months           BP Readings from Last 3 Encounters:   08/16/18 142/87   07/31/18 122/68   05/03/18 124/70                      Patient has documented LDL within the past 12 mos.       Recent Labs   Lab Test  03/28/17   1113   LDL  104*                  Patient has had a Microalbumin in the past 15 mos.           Recent Labs   Lab Test  03/28/17   1114   MICROL  18   UMALCR  17.09                  Recent (6 mo) or future (30 days) visit within the authorizing provider's specialty       Patient had office visit in the last 6 months or has a visit in the next 30 days with authorizing provider or within the authorizing provider's specialty.  See \"Patient Info\" tab in inbasket, or \"Choose Columns\" in Meds & Orders section of the refill encounter.               Last Written Prescription Date:  7/30/18  Last Fill Quantity: 90,   # refills: 0  Last Office Visit: 5/3/18  Future Office visit:     "

## 2018-09-17 ENCOUNTER — TRANSFERRED RECORDS (OUTPATIENT)
Dept: HEALTH INFORMATION MANAGEMENT | Facility: CLINIC | Age: 68
End: 2018-09-17

## 2018-10-02 ENCOUNTER — OFFICE VISIT (OUTPATIENT)
Dept: SURGERY | Facility: OTHER | Age: 68
End: 2018-10-02
Attending: SURGERY
Payer: COMMERCIAL

## 2018-10-02 VITALS
WEIGHT: 220 LBS | HEART RATE: 74 BPM | BODY MASS INDEX: 41.57 KG/M2 | OXYGEN SATURATION: 99 % | SYSTOLIC BLOOD PRESSURE: 128 MMHG | DIASTOLIC BLOOD PRESSURE: 74 MMHG | TEMPERATURE: 97.3 F

## 2018-10-02 DIAGNOSIS — E66.01 MORBID OBESITY (H): ICD-10-CM

## 2018-10-02 DIAGNOSIS — E11.9 TYPE 2 DIABETES MELLITUS WITHOUT COMPLICATION, WITHOUT LONG-TERM CURRENT USE OF INSULIN (H): ICD-10-CM

## 2018-10-02 DIAGNOSIS — Z71.6 ENCOUNTER FOR TOBACCO USE CESSATION COUNSELING: ICD-10-CM

## 2018-10-02 DIAGNOSIS — K43.2 INCISIONAL HERNIA, WITHOUT OBSTRUCTION OR GANGRENE: Primary | ICD-10-CM

## 2018-10-02 DIAGNOSIS — I10 ESSENTIAL HYPERTENSION: ICD-10-CM

## 2018-10-02 DIAGNOSIS — F17.200 TOBACCO DEPENDENCE: ICD-10-CM

## 2018-10-02 DIAGNOSIS — J44.9 CHRONIC OBSTRUCTIVE PULMONARY DISEASE, UNSPECIFIED COPD TYPE (H): ICD-10-CM

## 2018-10-02 PROCEDURE — G0463 HOSPITAL OUTPT CLINIC VISIT: HCPCS

## 2018-10-02 PROCEDURE — 99203 OFFICE O/P NEW LOW 30 MIN: CPT | Performed by: SURGERY

## 2018-10-02 ASSESSMENT — PAIN SCALES - GENERAL: PAINLEVEL: NO PAIN (0)

## 2018-10-02 NOTE — MR AVS SNAPSHOT
After Visit Summary   10/2/2018    Sola Gan    MRN: [id number]           Patient Information     Date Of Birth          1950        Visit Information        Provider Department      10/2/2018 8:15 AM Rigo Coreas, DO [facility]        Care Instructions    Thank you for allowing Dr. COREAS and our surgical team to participate in your care.  If you have a scheduling or an appointment question please contact our Health Unit Coordinator at her direct line [phone].   ALL nursing questions or concerns can be directed to your clinic surgical nurse at: [phone]   Call the surgery nurse or your primary care provider if you should become ill within 1-2 weeks of your procedure and we will reschedule it when you are healthy. This includes signs or symptoms of a cold or the flu. This can include fever, chills, sore throat, cough, chest congestions, productive cough, runny nose.    You are scheduled for : HERNIA REPAIR AND PORT REMAOVAL  Your procedure date is: November 28    HOW TO PREPARE-          NEEDED WITH DR SHAH Preop appointment needed within the 30 days prior to your procedure          A responsible adult friend or family member must be available to drive you home and stay with you for 24 hours after you leave the hospital. You will not be allowed to drive yourself. If you need to take a taxi or the bus you MUST have a responsible adult to ride with you. YOUR PROCEDURE WILL BE CANCELLED IF YOU DO NOT HAVE A RESPONSIBLE ADULT TO DRIVE YOU HOME.       Unless otherwise instructed, DO NOT have anything to eat or drink after midnight the night before your surgery (or 8 hours prior to surgery), except clear liquids (water, clear juice, clear broth, plain coffee or tea without cream or milk) up until 2 hours prior to arrival time. Your stomach needs to be completely empty. Do NOT chew gum, suck on hard candy, or smoke. You can brush your teeth the morning  of surgery.       Please call our Surgery Education Nurses 1-2 weeks prior to your surgery date at  [phone] or toll free [phone]. Please have you medication and allergy lists ready.      Stop your Aspirin (325mg) or other NSAIDs(Ibuprofen, Motrin, Aleve, Celebrex, Naproxen, etc...) 7 days before your surgery unless otherwise instructed.      Hospital admitting will call you the day before your surgery with your arrival time. If you are scheduled on a Monday, you will be contacted the Friday before surgery.       You will need to wash the night before AND the morning of you procedure with the supplied Hibiclens following the instructions in your surgery handbook.       Return to clinic for a postop appointment December 11TH week(s) from your surgery date.            Follow-ups after your visit        Your next 10 appointments already scheduled     Nov 13, 2018  8:45 AM CST   (Arrive by 8:30 AM)   Pre-Op physical with Favian Recinos MD   [facility]    [address]  [facility] [address]   [phone]            Dec 11, 2018  9:30 AM CST   (Arrive by 9:15 AM)   [facility]    [address] [facility] [address] [phone]              Who to contact     If you have questions or need follow up information about today's clinic visit or your schedule please contact [facility] directly at [phone].  Normal or non-critical lab and imaging results will be communicated to you by MyChart, letter or phone within 4 business days after the clinic has received the results. If you do not hear from us within 7 days, please contact the clinic through MyChart or phone. If you have a critical or abnormal lab result, we will notify you by phone as soon as possible.  Submit refill requests through [employer] or call your pharmacy and they  will forward the refill request to us. Please allow 3 business days for your refill to be completed.          Additional Information About Your Visit        In[employer] Information     [employer] gives you secure access to your electronic health record. If you see a primary care provider, you can also send messages to your care team and make appointments. If you have questions, please call your primary care clinic.  If you do not have a primary care provider, please call [phone] and they will assist you.        Care EveryWhere ID     This is your Care EveryWhere ID. This could be used by other organizations to access your [place] medical records  [id number]        Your Vitals Were     Pulse Temperature Pulse Oximetry BMI (Body Mass Index)          74 97.3  F (36.3  C) (Tympanic) 99% 41.57 kg/m2         Blood Pressure from Last 3 Encounters:   10/02/18 128/74   08/16/18 142/87   07/31/18 122/68    Weight from Last 3 Encounters:   10/02/18 220 lb (99.8 kg)   07/31/18 220 lb (99.8 kg)   05/03/18 204 lb 8 oz (92.8 kg)              Today, you had the following     No orders found for display       Primary Care Provider Office Phone # Fax #    Favian Recinos [phone]       [address] [place] [id number]        Equal Access to Services     LUNA WATKINS : Hadii aad ku hadasho Soomaali, waaxda luqadaha, qaybta kaalmada adeegyada, madai ortegain hayaan belkys gann. So [facility] [phone].    ATENCIÓN: Si habla español, tiene a sanchez disposición servicios gratuitos de asistencia lingüística. Llame al [phone].    We comply with applicable federal civil rights laws and [place] laws. We do not discriminate on the basis of race, color, national origin, age, disability, sex, sexual orientation, or gender identity.            Thank you!     Thank you for choosing [facility]  for your care. Our goal is always to provide you with excellent care. Hearing back from our  patients is one way we can continue to improve our services. Please take a few minutes to complete the written survey that you may receive in the mail after your visit with us. Thank you!             Your Updated Medication List - Protect others around you: Learn how to safely use, store and throw away your medicines at www.disposemymeds.org.          This list is accurate as of 10/2/18  8:35 AM.  Always use your most recent med list.                   Brand Name Dispense Instructions for use Diagnosis    ADVIL PO      Take 600-800 mg by mouth every 8 hours as needed for moderate pain        aspirin 81 MG EC tablet     90 tablet    Take 1 tablet (81 mg) by mouth daily    Controlled type 2 diabetes mellitus without complication, without long-term current use of insulin (H)       atorvastatin 40 MG tablet    LIPITOR    90 tablet    TAKE 1 TABLET(40 MG) BY MOUTH DAILY    Controlled type 2 diabetes mellitus without complication, without long-term current use of insulin (H)       BENADRYL PO      Take 25 mg by mouth nightly as needed for sleep        blood glucose monitoring lancets     102 each    Use to test blood sugar 2 times daily.    Type 2 diabetes mellitus with hyperglycemia, without long-term current use of insulin (H)       blood glucose monitoring meter device kit     1 kit    Use to test blood sugar 2 times daily .    Type 2 diabetes mellitus with hyperglycemia, without long-term current use of insulin (H)       blood glucose monitoring test strip    ACCU-CHEK YOLANDA PLUS    100 each    Use to test blood sugar 2 times daily.    Type 2 diabetes mellitus with hyperglycemia, without long-term current use of insulin (H)       COMBIVENT RESPIMAT  MCG/ACT inhaler   Generic drug:  Ipratropium-Albuterol     4 g    INHALE 1 PUFF INTO THE LUNGS FOUR TIMES DAILY AS NEEDED FOR SHORTNESS OF BREATH    Other emphysema (H)       FLUoxetine 20 MG capsule    PROzac    90 capsule    TAKE 1 CAPSULE(20 MG) BY MOUTH DAILY     Other depression       IMODIUM A-D PO      Take 1 tablet by mouth 2 times daily        lisinopril 10 MG tablet    PRINIVIL/ZESTRIL    30 tablet    TAKE 1 TABLET(10 MG) BY MOUTH DAILY    Benign essential hypertension       loratadine-pseudoePHEDrine  MG per 24 hr tablet    CLARITIN-D 24-hour     Take 1 tablet by mouth daily        * METFORMIN HCL PO      Take 500 mg by mouth 3 times daily (with meals)        * metFORMIN 500 MG tablet    GLUCOPHAGE    90 tablet    TAKE 1 TABLET BY MOUTH THREE TIMES DAILY WITH MEALS.    Type 2 diabetes mellitus without complication, without long-term current use of insulin (H)       MULTIVITAMIN GUMMIES ADULT PO      Take 2 chew tab by mouth daily        Na Sulfate-K Sulfate-Mg Sulf solution    SUPREP BOWEL PREP    2 Bottle    Take 177 mLs (1 Bottle) by mouth See Admin Instructions    Cancer of sigmoid colon (H)       ondansetron 4 MG tablet    ZOFRAN    10 tablet    Take 1 tablet (4 mg) by mouth every 6 hours as needed for nausea    Cancer of sigmoid colon (H)       pantoprazole 40 MG EC tablet    PROTONIX    60 tablet    TAKE 1 TABLET(40 MG) BY MOUTH TWICE DAILY BEFORE MEALS    Abdominal pain, epigastric       PRIMATENE ASTHMA 12.5-200 MG Tabs   Generic drug:  ePHEDrine-GuaiFENesin      Take 1 tablet by mouth At Bedtime        PROAIR  (90 Base) MCG/ACT inhaler   Generic drug:  albuterol     8.5 g    INHALE 2 PUFFS BY MOUTH EVERY 4 HOURS AS NEEDED FOR SHORTNESS OF BREATH    Shortness of breath       Probiotic 250 MG Caps     180 capsule    Take 1 capsule by mouth 2 times daily    Gastritis, presence of bleeding unspecified, unspecified chronicity, unspecified gastritis type       * Notice:  This list has 2 medication(s) that are the same as other medications prescribed for you. Read the directions carefully, and ask your doctor or other care provider to review them with you.

## 2018-10-02 NOTE — NURSING NOTE
"Chief Complaint   Patient presents with     Consult For     Hernia/Port removal        Initial /74 (BP Location: Right arm, Patient Position: Chair, Cuff Size: Adult Large)  Pulse 74  Temp 97.3  F (36.3  C) (Tympanic)  Wt 220 lb (99.8 kg)  SpO2 99%  BMI 41.57 kg/m2 Estimated body mass index is 41.57 kg/(m^2) as calculated from the following:    Height as of 7/31/18: 5' 1\" (1.549 m).    Weight as of this encounter: 220 lb (99.8 kg).  Medication Reconciliation: complete    Aura Noriega LPN    "

## 2018-10-02 NOTE — PROGRESS NOTES
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  She underwent robotic resection Summer 2017 and underwent chemotherapy for 3 positive lymph nodes.  Almost immediately after surgery she felt a bulge in the lower right side of her abdomen, under one of the incisions.  Causes pain when she coughs.  Limits her bending over, thinks its getting larger.  Doesn't try to push it in, but states she thinks it sometimes gets hard.  Denies nausea, vomiting, changes in bowel habits.  She's a current smoker, diabetic, HTN, COPD and morbidly obese.  States she has lost 60 lbs after her surgery and is continuing to be pro-active about losing weight.     Medical history:  Past Medical History:   Diagnosis Date     COPD (chronic obstructive pulmonary disease) (H)      Diabetes (H)      H/O sleep apnea     tonsils, adnoids, and uvula removed     Uncomplicated asthma        Surgical history:  Past Surgical History:   Procedure Laterality Date     APPENDECTOMY       CHOLECYSTECTOMY       COLONOSCOPY N/A 2017    Procedure: COLONOSCOPY;  COLONOSCOPY WITH POLYPECTOMY, BIOPSY, ENDOSCOPIC MARKER TATOOING;  Surgeon: Rigo Coreas DO;  Location: [facility]     COLONOSCOPY N/A 2018    Procedure: COLONOSCOPY;  COLONOSCOPY with biopsy;  Surgeon: Rigo Coreas DO;  Location: [facility]     ENT SURGERY      for MORA     GYN SURGERY       HERNIA REPAIR       INSERT PORT VASCULAR ACCESS N/A 2017    Procedure: INSERT PORT VASCULAR ACCESS;  PORT-A-CATH PLACEMENT LEFT INTERNAL JUGULAR;  Surgeon: Awais Ding MD;  Location: [facility]       Family history:  Family History   Problem Relation Age of Onset     Other Cancer Mother      ovarian      Breast Cancer Mother      Bronchitis Father      Peripheral Neuropathy Father      Hypertension Father        Medications:  Prior to Admission  medications    Medication Sig Start Date End Date Taking? Authorizing Provider   atorvastatin (LIPITOR) 40 MG tablet TAKE 1 TABLET(40 MG) BY MOUTH DAILY 9/27/18  Yes Favian Recinos MD   blood glucose monitoring (ACCU-CHEK YOLANDA PLUS) meter device kit Use to test blood sugar 2 times daily  . 11/17/17  Yes Lisy Radford NP   blood glucose monitoring (ACCU-CHEK YOLANDA PLUS) test strip Use to test blood sugar 2 times daily. 11/17/17  Yes Favian Recinos MD   blood glucose monitoring (ACCU-CHEK FASTCLIX) lancets Use to test blood sugar 2 times daily. 11/17/17  Yes Favian Recinos MD   COMBIVENT RESPIMAT  MCG/ACT inhaler INHALE 1 PUFF INTO THE LUNGS FOUR TIMES DAILY AS NEEDED FOR SHORTNESS OF BREATH 9/6/18  Yes Favian Recinos MD   DiphenhydrAMINE HCl (BENADRYL PO) Take 25 mg by mouth nightly as needed for sleep   Yes Unknown, Entered By History   ePHEDrine-GuaiFENesin (PRIMATENE ASTHMA) 12.5-200 MG TABS Take 1 tablet by mouth At Bedtime   Yes Unknown, Entered By History   FLUoxetine (PROZAC) 20 MG capsule TAKE 1 CAPSULE(20 MG) BY MOUTH DAILY 8/8/18  Yes Favian Recinos MD   Ibuprofen (ADVIL PO) Take 600-800 mg by mouth every 8 hours as needed for moderate pain    Yes Reported, Patient   lisinopril (PRINIVIL/ZESTRIL) 10 MG tablet TAKE 1 TABLET(10 MG) BY MOUTH DAILY 9/6/18  Yes Favian Recinos MD   loratadine-pseudoePHEDrine (CLARITIN-D 24-HOUR)  MG per 24 hr tablet Take 1 tablet by mouth daily   Yes Reported, Patient   metFORMIN (GLUCOPHAGE) 500 MG tablet TAKE 1 TABLET BY MOUTH THREE TIMES DAILY WITH MEALS. 9/6/18  Yes Favian Recinos MD   METFORMIN HCL PO Take 500 mg by mouth 3 times daily (with meals)    Yes Unknown, Entered By History   pantoprazole (PROTONIX) 40 MG EC tablet TAKE 1 TABLET(40 MG) BY MOUTH TWICE DAILY BEFORE MEALS 9/6/18  Yes Favian Recinos MD   PROAIR  (90 Base) MCG/ACT inhaler INHALE 2 PUFFS BY MOUTH EVERY 4 HOURS AS NEEDED FOR SHORTNESS OF BREATH 7/16/18   Yes Favian Recinos MD   aspirin 81 MG EC tablet Take 1 tablet (81 mg) by mouth daily  Patient not taking: Reported on 10/2/2018 4/13/17   Favian Recinos MD   Loperamide HCl (IMODIUM A-D PO) Take 1 tablet by mouth 2 times daily    Reported, Patient   Multiple Vitamins-Minerals (MULTIVITAMIN GUMMIES ADULT PO) Take 2 chew tab by mouth daily    Unknown, Entered By History   Na Sulfate-K Sulfate-Mg Sulf (SUPREP BOWEL PREP) solution Take 177 mLs (1 Bottle) by mouth See Admin Instructions  Patient not taking: Reported on 10/2/2018 7/31/18   Rigo Coreas,    ondansetron (ZOFRAN) 4 MG tablet Take 1 tablet (4 mg) by mouth every 6 hours as needed for nausea  Patient not taking: Reported on 10/2/2018 9/8/17   Awais Ding MD   Saccharomyces boulardii (PROBIOTIC) 250 MG CAPS Take 1 capsule by mouth 2 times daily  Patient not taking: Reported on 10/2/2018 11/2/17   Favian Recinos MD       Allergies:  The patientis allergic to acyclovir and sulfa drugs.  .  Social history:  Social History   Substance Use Topics     Smoking status: Current Every Day Smoker     Packs/day: 0.75     Years: 20.00     Smokeless tobacco: Never Used     Alcohol use No     Marital status: single.    Review of Systems:    Constitutional: Per HPI  HENT: Negative for ear pain, nosebleeds, congestion, sore throat, tinnitus and ear discharge.    Eyes: Negative for blurred vision, double vision, photophobia and pain.   Respiratory: Negative for cough, hemoptysis, shortness of breath, wheezing and stridor.    Cardiovascular: Negative for chest pain, palpitations and orthopnea.   Gastrointestinal: Per HPI  Genitourinary: Negative for urgency, frequency and hematuria.   Musculoskeletal: Negative for myalgias, back pain and joint pain.   Neurological: Negative for tingling, speech change and headaches.   Endo/Heme/Allergies: Does not bruise/bleed easily.   Psychiatric/Behavioral: Negative for depression, suicidal ideas and  hallucinations. The patient is not nervous/anxious.    Physical Examination:  /74 (BP Location: Right arm, Patient Position: Chair, Cuff Size: Adult Large)  Pulse 74  Temp 97.3  F (36.3  C) (Tympanic)  Wt 99.8 kg (220 lb)  SpO2 99%  BMI 41.57 kg/m2  General: AAOx4, NAD, WN/WD, ambulating without assistance  HEENT:NCAT, EOMI, PERRL Sclerae anicteric; Trachea mideline, no JVD  Abdomen: Obese, soft, ND, mildly tender RLQ below transverse incision with a reducible mass that increases in size with increased abdominal pressure.    Extremities: Cursory exam unremarkable.  Skin: Warm, dry, < 2 sec cap refill  Neuro: CN 2-12 grossly intact, no focal deficit, GCS 15  Psych: happy, calm, asks appropriate questions    Assessment/Plan:  #1 Incisional hernia  #2 DM II  #3 Morbid obesity  #4 Tobacco dependence  #5 HTN  #6 COPD    Thank you for the consult.  Mrs. Gan and I had a long and sissy discussion about the pathophysiology of hernias and their progression from asymptomatic, to symptomatic to incarceration and strangulation.  She actually sounds like she's symptomatic of this hernia and I spent time showing her the CT abdomen that showed the hernia with multiple loops of small bowel in it.  We talked extensively about how her co-morbidities not only make her a higher risk for surgery, but also decreases the effectiveness and longevity of the repair.   The patient has been educated on the signs and symptoms of incarceration and strangulation and instructed to seek medical attention immediately.  The risk, benefits and alternatives have been fully discussed.  These include, but aren't limited to, the risk of bleeding, infection, recurrence, injury to adjacent structures and chronic pain.  Despite all this, the patient wishes to proceed with surgery, which I think is reasonable.  The patient has been fully consented and listed hernia repair. She was congratulated with the weight loss, encouraged to continue this  while we are waiting for the surgery date.  She mentioned she was going to try again to stop smoking.  She is going to see her Primary for the thyroid nodule work up and then pre-op end of November.  I've listed this as laparoscopic possible open ventral hernia repair and port-a-cath removal.         Dr Coreas  [facility] and [address], [address]  [place] [address]    Referring Provider:  [place] [address]     Primary Care Provider:  Favian Recinos

## 2018-10-02 NOTE — PATIENT INSTRUCTIONS
Thank you for allowing Dr. EDWARDS and our surgical team to participate in your care.  If you have a scheduling or an appointment question please contact our Health Unit Coordinator at her direct line [phone].   ALL nursing questions or concerns can be directed to your clinic surgical nurse at: [phone]   Call the surgery nurse or your primary care provider if you should become ill within 1-2 weeks of your procedure and we will reschedule it when you are healthy. This includes signs or symptoms of a cold or the flu. This can include fever, chills, sore throat, cough, chest congestions, productive cough, runny nose.    You are scheduled for : HERNIA REPAIR AND PORT REMAOVAL  Your procedure date is: November 28    HOW TO PREPARE-          NEEDED WITH DR SHAH Preop appointment needed within the 30 days prior to your procedure          A responsible adult friend or family member must be available to drive you home and stay with you for 24 hours after you leave the hospital. You will not be allowed to drive yourself. If you need to take a taxi or the bus you MUST have a responsible adult to ride with you. YOUR PROCEDURE WILL BE CANCELLED IF YOU DO NOT HAVE A RESPONSIBLE ADULT TO DRIVE YOU HOME.       Unless otherwise instructed, DO NOT have anything to eat or drink after midnight the night before your surgery (or 8 hours prior to surgery), except clear liquids (water, clear juice, clear broth, plain coffee or tea without cream or milk) up until 2 hours prior to arrival time. Your stomach needs to be completely empty. Do NOT chew gum, suck on hard candy, or smoke. You can brush your teeth the morning of surgery.       Please call our Surgery Education Nurses 1-2 weeks prior to your surgery date at  [phone] or toll free [phone]. Please have you medication and allergy lists ready.      Stop your Aspirin (325mg) or other NSAIDs(Ibuprofen, Motrin, Aleve, Celebrex, Naproxen, etc...) 7 days before your  surgery unless otherwise instructed.      Hospital admitting will call you the day before your surgery with your arrival time. If you are scheduled on a Monday, you will be contacted the Friday before surgery.       You will need to wash the night before AND the morning of you procedure with the supplied Hibiclens following the instructions in your surgery handbook.       Return to clinic for a postop appointment December 11TH week(s) from your surgery date.

## 2018-10-04 DIAGNOSIS — E11.9 TYPE 2 DIABETES MELLITUS WITHOUT COMPLICATION, WITHOUT LONG-TERM CURRENT USE OF INSULIN (H): ICD-10-CM

## 2018-10-04 DIAGNOSIS — R10.13 ABDOMINAL PAIN, EPIGASTRIC: ICD-10-CM

## 2018-10-04 DIAGNOSIS — I10 BENIGN ESSENTIAL HYPERTENSION: ICD-10-CM

## 2018-10-04 DIAGNOSIS — J43.8 OTHER EMPHYSEMA (H): ICD-10-CM

## 2018-10-05 RX ORDER — LISINOPRIL 10 MG/1
TABLET ORAL
Qty: 90 TABLET | Refills: 1 | OUTPATIENT
Start: 2018-10-05

## 2018-10-05 RX ORDER — IPRATROPIUM BROMIDE AND ALBUTEROL 20; 100 UG/1; UG/1
SPRAY, METERED RESPIRATORY (INHALATION)
Qty: 4 G | Refills: 0 | OUTPATIENT
Start: 2018-10-05

## 2018-10-05 RX ORDER — PANTOPRAZOLE SODIUM 40 MG/1
TABLET, DELAYED RELEASE ORAL
Qty: 180 TABLET | Refills: 1 | OUTPATIENT
Start: 2018-10-05

## 2018-10-09 NOTE — PROGRESS NOTES
SUBJECTIVE:                                                    Sola Gan is a 67 year old female who presents to clinic today for the following health issues:        Thyroid nodule found by Dr Coreas an CT of Chest/Abd/Pelvis      Duration: found 08/01/18    Description (location/character/radiation): thyroid nodule    Intensity:  na    Accompanying signs and symptoms: loose stools, dry skin,    History (similar episodes/previous evaluation): None    Precipitating or alleviating factors: None    Therapies tried and outcome: None       Diabetes Follow-up    Patient is checking blood sugars: once daily.  Results are as follows:              postprandial after supper- 's    Diabetic concerns: None and other - left ankle itching     Symptoms of hypoglycemia (low blood sugar): none     Paresthesias (numbness or burning in feet) or sores: Yes but from chemo     Date of last diabetic eye exam:     BP Readings from Last 2 Encounters:   10/10/18 126/76   10/02/18 128/74     Hemoglobin A1C (%)   Date Value   05/03/2018 5.8   10/26/2017 6.5 (H)     LDL Cholesterol Calculated (mg/dL)   Date Value   03/28/2017 104 (H)   04/01/2014 96       Diabetes Management Resources    Problem list and histories reviewed & adjusted, as indicated.  Additional history: lengthy review.  Due for basically everything.  Dm f/u.  Breast cares with repeat mammo.  Dexa.  Mostly here to have thyroid nodule evaluated.  We discussed and reviewed.  No sx at al.  She gets itchy dorsum of feet with the spider veins at times.      Patient Active Problem List   Diagnosis     COPD (chronic obstructive pulmonary disease) probable     Acute bronchitis     Morbid obesity (H)     Essential hypertension     Stasis dermatitis of both legs     MORA (obstructive sleep apnea)     Acute bronchitis with asthma     ACP (advance care planning)     Seasonal allergic rhinitis due to pollen     Controlled type 2 diabetes mellitus without complication, without  long-term current use of insulin (H)     Colon tumor     Adenocarcinoma of sigmoid colon (H)     Chest pain     Hypokalemia     Past Surgical History:   Procedure Laterality Date     APPENDECTOMY       CHOLECYSTECTOMY       COLONOSCOPY N/A 6/21/2017    Procedure: COLONOSCOPY;  COLONOSCOPY WITH POLYPECTOMY, BIOPSY, ENDOSCOPIC MARKER TATOOING;  Surgeon: Rigo Coreas DO;  Location: [facility]     COLONOSCOPY N/A 8/16/2018    Procedure: COLONOSCOPY;  COLONOSCOPY with biopsy;  Surgeon: Rigo Coreas DO;  Location: [facility]     ENT SURGERY      for MORA     GYN SURGERY       HERNIA REPAIR       INSERT PORT VASCULAR ACCESS N/A 9/8/2017    Procedure: INSERT PORT VASCULAR ACCESS;  PORT-A-CATH PLACEMENT LEFT INTERNAL JUGULAR;  Surgeon: Awais Ding MD;  Location: [facility]       Social History   Substance Use Topics     Smoking status: Current Every Day Smoker     Packs/day: 0.75     Years: 20.00     Smokeless tobacco: Never Used     Alcohol use No     Family History   Problem Relation Age of Onset     Other Cancer Mother      ovarian      Breast Cancer Mother      Bronchitis Father      Peripheral Neuropathy Father      Hypertension Father          Current Outpatient Prescriptions   Medication Sig Dispense Refill     atorvastatin (LIPITOR) 40 MG tablet TAKE 1 TABLET(40 MG) BY MOUTH DAILY 90 tablet 0     blood glucose monitoring (ACCU-CHEK YOLANDA PLUS) meter device kit Use to test blood sugar 2 times daily  . 1 kit 0     blood glucose monitoring (ACCU-CHEK YOLANDA PLUS) test strip Use to test blood sugar 2 times daily. 100 each 10     blood glucose monitoring (ACCU-CHEK FASTCLIX) lancets Use to test blood sugar 2 times daily. 102 each 10     COMBIVENT RESPIMAT  MCG/ACT inhaler INHALE 1 PUFF INTO THE LUNGS FOUR TIMES DAILY AS NEEDED FOR SHORTNESS OF BREATH 4 g 0     DiphenhydrAMINE HCl (BENADRYL PO) Take 25 mg by mouth nightly as needed for sleep       ePHEDrine-GuaiFENesin (PRIMATENE ASTHMA) 12.5-200 MG TABS Take  "1 tablet by mouth nightly as needed        FLUoxetine (PROZAC) 20 MG capsule TAKE 1 CAPSULE(20 MG) BY MOUTH DAILY 90 capsule 0     Ibuprofen (ADVIL PO) Take 600-800 mg by mouth every 8 hours as needed for moderate pain        lisinopril (PRINIVIL/ZESTRIL) 10 MG tablet TAKE 1 TABLET(10 MG) BY MOUTH DAILY 30 tablet 1     loratadine-pseudoePHEDrine (CLARITIN-D 24-HOUR)  MG per 24 hr tablet Take 1 tablet by mouth daily       metFORMIN (GLUCOPHAGE) 500 MG tablet TAKE 1 TABLET BY MOUTH THREE TIMES DAILY WITH MEALS. 90 tablet 0     order for DME Equipment being ordered: hcang socks 2 each 1     pantoprazole (PROTONIX) 40 MG EC tablet TAKE 1 TABLET(40 MG) BY MOUTH TWICE DAILY BEFORE MEALS 60 tablet 1     PROAIR  (90 Base) MCG/ACT inhaler INHALE 2 PUFFS BY MOUTH EVERY 4 HOURS AS NEEDED FOR SHORTNESS OF BREATH 8.5 g 1     triamcinolone (KENALOG) 0.5 % cream Apply sparingly to affected area three times daily. 30 g 0     [DISCONTINUED] METFORMIN HCL PO Take 500 mg by mouth 3 times daily (with meals)        Allergies   Allergen Reactions     Acyclovir Nausea     Sulfa Drugs Hives       ROS:  Constitutional, HEENT, cardiovascular, pulmonary, gi and gu systems are negative, except as otherwise noted.    OBJECTIVE:                                                    /76  Pulse 77  Temp 98.4  F (36.9  C) (Tympanic)  Resp 16  Ht 5' 0.5\" (1.537 m)  Wt 209 lb (94.8 kg)  SpO2 96%  BMI 40.15 kg/m2  Body mass index is 40.15 kg/(m^2).  GENERAL APPEARANCE: Alert, no acute distress  CV: regular rate and rhythm, no murmur, rub or gallop  RESP: lungs clear to auscultation bilaterally  ABDOMEN: normal bowel sounds, soft, nontender, no hepatosplenomegaly or other masses  SKIN: no suspicious lesions or rashes to visualized skin  NEURO: Alert, oriented x 3, speech and mentation normal  DIABETIC FOOT EXAM: normal DP and PT pulses, no trophic changes or ulcerative lesions and normal sensory exam except ends of toes slightly " decreased.  Prominent spider veins.       Labs pending.      ASSESSMENT/PLAN:                                                    1. Breast nodule  Reviewed.  She is due for the mammo and just wants that.  Sent the order.     2. Controlled type 2 diabetes mellitus without complication, without long-term current use of insulin (H)  Reviewed.  Update full labs today.  Good control traditionally.   - Hemoglobin A1c  - Albumin Random Urine Quantitative with Creat Ratio  - Lipid Profile  - Basic metabolic panel  - HIV Antigen Antibody Combo  - ALT  - Foot Exam - HIM Scan    3. Thyroid nodule  Reviewed.  I can't palpate it.  Getting US and TSH to start with.   - TSH with free T4 reflex  - US Thyroid; Future    4. Adenocarcinoma of sigmoid colon (H)  Reviewed.  She has the hernia.  Going to have that fixed.  Doing well.     5. Essential hypertension  Stable.     6. Asymptomatic menopausal state  Reviewed.  Getting the dexa.     7. Mammographic calcification found on diagnostic imaging of breast   History of.  Getting the repeat to compare.     8. Venous stasis dermatitis of both lower extremities  Discussed.  Steroid on hand for itchy sx.  She understands.  Minimize though.   - triamcinolone (KENALOG) 0.5 % cream; Apply sparingly to affected area three times daily.  Dispense: 30 g; Refill: 0  - order for DME; Equipment being ordered: chang socks  Dispense: 2 each; Refill: 1          Favian Recinos MD  [facility]

## 2018-10-10 ENCOUNTER — OFFICE VISIT (OUTPATIENT)
Dept: FAMILY MEDICINE | Facility: OTHER | Age: 68
End: 2018-10-10
Attending: FAMILY MEDICINE
Payer: MEDICARE

## 2018-10-10 VITALS
TEMPERATURE: 98.4 F | HEIGHT: 61 IN | RESPIRATION RATE: 16 BRPM | SYSTOLIC BLOOD PRESSURE: 126 MMHG | DIASTOLIC BLOOD PRESSURE: 76 MMHG | WEIGHT: 209 LBS | HEART RATE: 77 BPM | OXYGEN SATURATION: 96 % | BODY MASS INDEX: 39.46 KG/M2

## 2018-10-10 DIAGNOSIS — C18.7 CANCER OF SIGMOID COLON (H): ICD-10-CM

## 2018-10-10 DIAGNOSIS — E04.1 THYROID NODULE: ICD-10-CM

## 2018-10-10 DIAGNOSIS — Z78.0 ASYMPTOMATIC MENOPAUSAL STATE: ICD-10-CM

## 2018-10-10 DIAGNOSIS — R92.1 MAMMOGRAPHIC CALCIFICATION FOUND ON DIAGNOSTIC IMAGING OF BREAST: ICD-10-CM

## 2018-10-10 DIAGNOSIS — N63.0 BREAST NODULE: Primary | ICD-10-CM

## 2018-10-10 DIAGNOSIS — E11.9 CONTROLLED TYPE 2 DIABETES MELLITUS WITHOUT COMPLICATION, WITHOUT LONG-TERM CURRENT USE OF INSULIN (H): ICD-10-CM

## 2018-10-10 DIAGNOSIS — I87.2 VENOUS STASIS DERMATITIS OF BOTH LOWER EXTREMITIES: ICD-10-CM

## 2018-10-10 DIAGNOSIS — I10 ESSENTIAL HYPERTENSION: ICD-10-CM

## 2018-10-10 LAB
ALT SERPL W P-5'-P-CCNC: 24 U/L (ref 0–50)
ANION GAP SERPL CALCULATED.3IONS-SCNC: 5 MMOL/L (ref 3–14)
BUN SERPL-MCNC: 13 MG/DL (ref 7–30)
CALCIUM SERPL-MCNC: 8.8 MG/DL (ref 8.5–10.1)
CHLORIDE SERPL-SCNC: 101 MMOL/L (ref 94–109)
CHOLEST SERPL-MCNC: 125 MG/DL
CO2 SERPL-SCNC: 28 MMOL/L (ref 20–32)
CREAT SERPL-MCNC: 0.8 MG/DL (ref 0.52–1.04)
CREAT UR-MCNC: 38 MG/DL
EST. AVERAGE GLUCOSE BLD GHB EST-MCNC: 128 MG/DL
GFR SERPL CREATININE-BSD FRML MDRD: 71 ML/MIN/1.7M2
GLUCOSE SERPL-MCNC: 98 MG/DL (ref 70–99)
HBA1C MFR BLD: 6.1 % (ref 0–5.6)
HDLC SERPL-MCNC: 57 MG/DL
LDLC SERPL CALC-MCNC: 46 MG/DL
MICROALBUMIN UR-MCNC: <5 MG/L
MICROALBUMIN/CREAT UR: NORMAL MG/G CR (ref 0–25)
NONHDLC SERPL-MCNC: 68 MG/DL
POTASSIUM SERPL-SCNC: 3.6 MMOL/L (ref 3.4–5.3)
SODIUM SERPL-SCNC: 134 MMOL/L (ref 133–144)
TRIGL SERPL-MCNC: 110 MG/DL
TSH SERPL DL<=0.005 MIU/L-ACNC: 2.21 MU/L (ref 0.4–4)

## 2018-10-10 PROCEDURE — 36415 COLL VENOUS BLD VENIPUNCTURE: CPT | Mod: ZL | Performed by: FAMILY MEDICINE

## 2018-10-10 PROCEDURE — 99000 SPECIMEN HANDLING OFFICE-LAB: CPT | Performed by: FAMILY MEDICINE

## 2018-10-10 PROCEDURE — 83036 HEMOGLOBIN GLYCOSYLATED A1C: CPT | Mod: ZL | Performed by: FAMILY MEDICINE

## 2018-10-10 PROCEDURE — 80048 BASIC METABOLIC PNL TOTAL CA: CPT | Mod: ZL | Performed by: FAMILY MEDICINE

## 2018-10-10 PROCEDURE — 40000788 ZZHCL STATISTIC ESTIMATED AVERAGE GLUCOSE: Mod: ZL | Performed by: FAMILY MEDICINE

## 2018-10-10 PROCEDURE — 87389 HIV-1 AG W/HIV-1&-2 AB AG IA: CPT | Mod: ZL | Performed by: FAMILY MEDICINE

## 2018-10-10 PROCEDURE — 99214 OFFICE O/P EST MOD 30 MIN: CPT | Performed by: FAMILY MEDICINE

## 2018-10-10 PROCEDURE — 80061 LIPID PANEL: CPT | Mod: ZL | Performed by: FAMILY MEDICINE

## 2018-10-10 PROCEDURE — 84443 ASSAY THYROID STIM HORMONE: CPT | Mod: ZL | Performed by: FAMILY MEDICINE

## 2018-10-10 PROCEDURE — 84460 ALANINE AMINO (ALT) (SGPT): CPT | Mod: ZL | Performed by: FAMILY MEDICINE

## 2018-10-10 PROCEDURE — 82043 UR ALBUMIN QUANTITATIVE: CPT | Mod: ZL | Performed by: FAMILY MEDICINE

## 2018-10-10 PROCEDURE — G0463 HOSPITAL OUTPT CLINIC VISIT: HCPCS

## 2018-10-10 RX ORDER — TRIAMCINOLONE ACETONIDE 5 MG/G
CREAM TOPICAL
Qty: 30 G | Refills: 0 | Status: SHIPPED | OUTPATIENT
Start: 2018-10-10 | End: 2018-12-14

## 2018-10-10 ASSESSMENT — ANXIETY QUESTIONNAIRES
GAD7 TOTAL SCORE: 1
3. WORRYING TOO MUCH ABOUT DIFFERENT THINGS: NOT AT ALL
6. BECOMING EASILY ANNOYED OR IRRITABLE: SEVERAL DAYS
IF YOU CHECKED OFF ANY PROBLEMS ON THIS QUESTIONNAIRE, HOW DIFFICULT HAVE THESE PROBLEMS MADE IT FOR YOU TO DO YOUR WORK, TAKE CARE OF THINGS AT HOME, OR GET ALONG WITH OTHER PEOPLE: NOT DIFFICULT AT ALL
2. NOT BEING ABLE TO STOP OR CONTROL WORRYING: NOT AT ALL
1. FEELING NERVOUS, ANXIOUS, OR ON EDGE: NOT AT ALL
5. BEING SO RESTLESS THAT IT IS HARD TO SIT STILL: NOT AT ALL
7. FEELING AFRAID AS IF SOMETHING AWFUL MIGHT HAPPEN: NOT AT ALL

## 2018-10-10 ASSESSMENT — PATIENT HEALTH QUESTIONNAIRE - PHQ9: 5. POOR APPETITE OR OVEREATING: NOT AT ALL

## 2018-10-10 ASSESSMENT — PAIN SCALES - GENERAL: PAINLEVEL: NO PAIN (0)

## 2018-10-10 NOTE — NURSING NOTE
"Chief Complaint   Patient presents with     Thyroid Problem       Initial /76  Pulse 77  Temp 98.4  F (36.9  C) (Tympanic)  Resp 16  Ht 5' 0.5\" (1.537 m)  Wt 209 lb (94.8 kg)  SpO2 96%  BMI 40.15 kg/m2 Estimated body mass index is 40.15 kg/(m^2) as calculated from the following:    Height as of this encounter: 5' 0.5\" (1.537 m).    Weight as of this encounter: 209 lb (94.8 kg).  Medication Reconciliation: complete    Julia Madden LPN  "

## 2018-10-10 NOTE — MR AVS SNAPSHOT
After Visit Summary   10/10/2018    Sola Gan    MRN: [id number]           Patient Information     Date Of Birth          1950        Visit Information        Provider Department      10/10/2018 10:00 AM Favian Recinos MD [facility]        Today's Diagnoses     Breast nodule    -  1    Controlled type 2 diabetes mellitus without complication, without long-term current use of insulin (H)        Thyroid nodule        Adenocarcinoma of sigmoid colon (H)        Essential hypertension        Asymptomatic menopausal state        Mammographic calcification found on diagnostic imaging of breast         Venous stasis dermatitis of both lower extremities          Care Instructions    F/u with ongoing concerns.             Follow-ups after your visit        Your next 10 appointments already scheduled     Nov 13, 2018  8:45 AM CST   (Arrive by 8:30 AM)   Pre-Op physical with Favian Recinos MD   [facility] ([facility] )    [address]  [facility] [address]   [phone]            Nov 28, 2018   Procedure with Rigo Coreas DO   [facility] ([facility] )    [address] [place] [id number] [phone]            Dec 11, 2018  9:30 AM CST   (Arrive by 9:15 AM)   Post Op with Rigo Coreas DO   [facility] ([facility] )    [address] [place] [address]   [phone]              Future tests that were ordered for you today     Open Future Orders        Priority Expected Expires Ordered    MA Diagnostic Bilateral w/Gerson Routine  10/10/2019 10/10/2018    DX Hip/Pelvis/Spine Routine  10/10/2019 10/10/2018    US Thyroid Routine  10/10/2019 10/10/2018            Who to contact     If you have questions or need follow up information about today's clinic visit or your schedule please contact [facility] directly at  "[phone].  Normal or non-critical lab and imaging results will be communicated to you by [employer], letter or phone within 4 business days after the clinic has received the results. If you do not hear from us within 7 days, please contact the clinic through Linkit or phone. If you have a critical or abnormal lab result, we will notify you by phone as soon as possible.  Submit refill requests through [employer] or call your pharmacy and they will forward the refill request to us. Please allow 3 business days for your refill to be completed.          Additional Information About Your Visit        Member Savings Program[employer] Information     [employer] gives you secure access to your electronic health record. If you see a primary care provider, you can also send messages to your care team and make appointments. If you have questions, please call your primary care clinic.  If you do not have a primary care provider, please call [phone] and they will assist you.        Care EveryWhere ID     This is your Care EveryWhere ID. This could be used by other organizations to access your [facility] medical records  [id number]        Your Vitals Were     Pulse Temperature Respirations Height Pulse Oximetry BMI (Body Mass Index)    77 98.4  F (36.9  C) (Tympanic) 16 5' 0.5\" (1.537 m) 96% 40.15 kg/m2       Blood Pressure from Last 3 Encounters:   10/10/18 126/76   10/02/18 128/74   08/16/18 142/87    Weight from Last 3 Encounters:   10/10/18 209 lb (94.8 kg)   10/02/18 220 lb (99.8 kg)   07/31/18 220 lb (99.8 kg)              We Performed the Following     Albumin Random Urine Quantitative with Creat Ratio     ALT     Basic metabolic panel     Foot Exam - HIM Scan     Hemoglobin A1c     HIV Antigen Antibody Combo     Lipid Profile     TSH with free T4 reflex          Today's Medication Changes          These changes are accurate as of 10/10/18 12:01 PM.  If you have any questions, ask your nurse or doctor.               Start taking these medicines.  "       Dose/Directions    order for DME   Used for:  Venous stasis dermatitis of both lower extremities   Started by:  Favian Recinos MD        Equipment being ordered: chang socks   Quantity:  2 each   Refills:  1       triamcinolone 0.5 % cream   Commonly known as:  KENALOG   Used for:  Venous stasis dermatitis of both lower extremities   Started by:  Favian Recinos MD        Apply sparingly to affected area three times daily.   Quantity:  30 g   Refills:  0            Where to get your medicines      These medications were sent to [facility] [place] [facility] [place] - [address] [facility] [address], [facility] [address]     Phone:  [phone]     triamcinolone 0.5 % cream         Some of these will need a paper prescription and others can be bought over the counter.  Ask your nurse if you have questions.     Bring a paper prescription for each of these medications     order for DME                Primary Care Provider Office Phone # Fax #    Favian Recinos [phone]       [address] [place] [address]        Equal Access to Services     [facility] Center[facility] AH: Hadii aad ku hadasho Soomaali, waaxda luqadaha, qaybta kaalmada adeegyada, waxay popeye keller . So [facility] [phone].    ATENCIÓN: Si habla español, tiene a sanchez disposición servicios gratuitos de asistencia lingüística. Ll[facility] [phone].    We comply with applicable federal civil rights laws and [place] laws. We do not discriminate on the basis of race, color, national origin, age, disability, sex, sexual orientation, or gender identity.            Thank you!     Thank you for choosing [facility]  for your care. Our goal is always to provide you with excellent care. Hearing back from our patients is one way we can continue to improve our services. Please take a few minutes to complete the written survey that you may receive in the mail after your  visit with us. Thank you!             Your Updated Medication List - Protect others around you: Learn how to safely use, store and throw away your medicines at www.disposemymeds.org.          This list is accurate as of 10/10/18 12:01 PM.  Always use your most recent med list.                   Brand Name Dispense Instructions for use Diagnosis    ADVIL PO      Take 600-800 mg by mouth every 8 hours as needed for moderate pain        atorvastatin 40 MG tablet    LIPITOR    90 tablet    TAKE 1 TABLET(40 MG) BY MOUTH DAILY    Controlled type 2 diabetes mellitus without complication, without long-term current use of insulin (H)       BENADRYL PO      Take 25 mg by mouth nightly as needed for sleep        blood glucose monitoring lancets     102 each    Use to test blood sugar 2 times daily.    Type 2 diabetes mellitus with hyperglycemia, without long-term current use of insulin (H)       blood glucose monitoring meter device kit     1 kit    Use to test blood sugar 2 times daily .    Type 2 diabetes mellitus with hyperglycemia, without long-term current use of insulin (H)       blood glucose monitoring test strip    ACCU-CHEK YOLANDA PLUS    100 each    Use to test blood sugar 2 times daily.    Type 2 diabetes mellitus with hyperglycemia, without long-term current use of insulin (H)       COMBIVENT RESPIMAT  MCG/ACT inhaler   Generic drug:  Ipratropium-Albuterol     4 g    INHALE 1 PUFF INTO THE LUNGS FOUR TIMES DAILY AS NEEDED FOR SHORTNESS OF BREATH    Other emphysema (H)       FLUoxetine 20 MG capsule    PROzac    90 capsule    TAKE 1 CAPSULE(20 MG) BY MOUTH DAILY    Other depression       lisinopril 10 MG tablet    PRINIVIL/ZESTRIL    30 tablet    TAKE 1 TABLET(10 MG) BY MOUTH DAILY    Benign essential hypertension       loratadine-pseudoePHEDrine  MG per 24 hr tablet    CLARITIN-D 24-hour     Take 1 tablet by mouth daily        metFORMIN 500 MG tablet    GLUCOPHAGE    90 tablet    TAKE 1 TABLET BY MOUTH  THREE TIMES DAILY WITH MEALS.    Type 2 diabetes mellitus without complication, without long-term current use of insulin (H)       order for DME     2 each    Equipment being ordered: chang socks    Venous stasis dermatitis of both lower extremities       pantoprazole 40 MG EC tablet    PROTONIX    60 tablet    TAKE 1 TABLET(40 MG) BY MOUTH TWICE DAILY BEFORE MEALS    Abdominal pain, epigastric       PRIMATENE ASTHMA 12.5-200 MG Tabs   Generic drug:  ePHEDrine-GuaiFENesin      Take 1 tablet by mouth nightly as needed        PROAIR  (90 Base) MCG/ACT inhaler   Generic drug:  albuterol     8.5 g    INHALE 2 PUFFS BY MOUTH EVERY 4 HOURS AS NEEDED FOR SHORTNESS OF BREATH    Shortness of breath       triamcinolone 0.5 % cream    KENALOG    30 g    Apply sparingly to affected area three times daily.    Venous stasis dermatitis of both lower extremities

## 2018-10-11 ASSESSMENT — ANXIETY QUESTIONNAIRES: GAD7 TOTAL SCORE: 1

## 2018-10-11 ASSESSMENT — PATIENT HEALTH QUESTIONNAIRE - PHQ9: SUM OF ALL RESPONSES TO PHQ QUESTIONS 1-9: 0

## 2018-10-12 LAB — HIV 1+2 AB+HIV1 P24 AG SERPL QL IA: NONREACTIVE

## 2018-10-25 ENCOUNTER — HOSPITAL ENCOUNTER (OUTPATIENT)
Dept: ULTRASOUND IMAGING | Facility: HOSPITAL | Age: 68
End: 2018-10-25
Attending: FAMILY MEDICINE
Payer: MEDICARE

## 2018-10-25 ENCOUNTER — HOSPITAL ENCOUNTER (OUTPATIENT)
Dept: BONE DENSITY | Facility: HOSPITAL | Age: 68
Discharge: HOME | End: 2018-10-25
Attending: FAMILY MEDICINE | Admitting: FAMILY MEDICINE
Payer: MEDICARE

## 2018-10-25 ENCOUNTER — RADIANT APPOINTMENT (OUTPATIENT)
Dept: MAMMOGRAPHY | Facility: OTHER | Age: 68
End: 2018-10-25
Attending: FAMILY MEDICINE
Payer: MEDICARE

## 2018-10-25 DIAGNOSIS — Z78.0 ASYMPTOMATIC MENOPAUSAL STATE: ICD-10-CM

## 2018-10-25 DIAGNOSIS — R92.1 MAMMOGRAPHIC CALCIFICATION FOUND ON DIAGNOSTIC IMAGING OF BREAST: ICD-10-CM

## 2018-10-25 DIAGNOSIS — N63.0 BREAST NODULE: ICD-10-CM

## 2018-10-25 DIAGNOSIS — E04.1 THYROID NODULE: ICD-10-CM

## 2018-10-25 PROCEDURE — 77080 DXA BONE DENSITY AXIAL: CPT | Mod: TC

## 2018-10-25 PROCEDURE — 76536 US EXAM OF HEAD AND NECK: CPT | Mod: TC

## 2018-10-25 PROCEDURE — G0279 TOMOSYNTHESIS, MAMMO: HCPCS | Mod: TC

## 2018-10-30 ENCOUNTER — TELEPHONE (OUTPATIENT)
Dept: FAMILY MEDICINE | Facility: OTHER | Age: 68
End: 2018-10-30

## 2018-10-30 DIAGNOSIS — E04.1 THYROID NODULE: Primary | ICD-10-CM

## 2018-11-05 ENCOUNTER — HOSPITAL ENCOUNTER (OUTPATIENT)
Dept: ULTRASOUND IMAGING | Facility: HOSPITAL | Age: 68
Discharge: HOME | End: 2018-11-05
Attending: FAMILY MEDICINE | Admitting: FAMILY MEDICINE
Payer: MEDICARE

## 2018-11-05 VITALS
RESPIRATION RATE: 16 BRPM | OXYGEN SATURATION: 100 % | DIASTOLIC BLOOD PRESSURE: 92 MMHG | SYSTOLIC BLOOD PRESSURE: 127 MMHG

## 2018-11-05 DIAGNOSIS — E04.1 THYROID NODULE: ICD-10-CM

## 2018-11-05 PROCEDURE — 88173 CYTOPATH EVAL FNA REPORT: CPT | Mod: TC | Performed by: RADIOLOGY

## 2018-11-05 PROCEDURE — 88305 TISSUE EXAM BY PATHOLOGIST: CPT | Mod: TC | Performed by: RADIOLOGY

## 2018-11-05 PROCEDURE — 25000125 ZZHC RX 250: Performed by: RADIOLOGY

## 2018-11-05 PROCEDURE — 76942 ECHO GUIDE FOR BIOPSY: CPT | Mod: TC

## 2018-11-05 PROCEDURE — 00000155 ZZHCL STATISTIC H-CELL BLOCK W/STAIN: Performed by: RADIOLOGY

## 2018-11-05 PROCEDURE — 88172 CYTP DX EVAL FNA 1ST EA SITE: CPT | Mod: TC | Performed by: RADIOLOGY

## 2018-11-05 RX ORDER — LIDOCAINE HYDROCHLORIDE 10 MG/ML
10 INJECTION, SOLUTION EPIDURAL; INFILTRATION; INTRACAUDAL; PERINEURAL ONCE
Status: COMPLETED | OUTPATIENT
Start: 2018-11-05 | End: 2018-11-05

## 2018-11-05 RX ADMIN — LIDOCAINE HYDROCHLORIDE 3 ML: 10 INJECTION, SOLUTION EPIDURAL; INFILTRATION; INTRACAUDAL at 14:04

## 2018-11-05 NOTE — PROGRESS NOTES
There were  no complications and patient has no symptoms..      Tolerated procedure well.    Procedure: Biopsy: right thyroid    Radiologist:Dr Ellis    Time Out: Prior to the start of the procedure and with procedural staff participation, I verbally confirmed the patient s identity using two indicators, relevant allergies, that the procedure was appropriate and matched the consent or emergent situation, and that the correct equipment/implants were available. Immediately prior to starting the procedure I conducted the Time Out with the procedural staff and re-confirmed the patient s name, procedure, and site/side. (The Joint Commission universal protocol was followed.)  Yes    Position:  supine    Pain:  denies    Sedation:None. Local Anesthestic used  No sedation    Estimated Blood Loss: Minimal     Condition: Stable    Comments: See dictated procedure note for full details     Vesta Farrell RN

## 2018-11-05 NOTE — IP AVS SNAPSHOT
MRN:[id number]                      After Visit Summary   11/5/2018    Sola Gan    MRN: [id number]           Visit Information        Provider Department      11/5/2018  1:30 PM [facility]; [facility]; [facility]           Review of your medicines      UNREVIEWED medicines. Ask your doctor about these medicines        Dose / Directions    ADVIL PO        Dose:  600-800 mg   Take 600-800 mg by mouth every 8 hours as needed for moderate pain   Refills:  0       atorvastatin 40 MG tablet   Commonly known as:  LIPITOR   Used for:  Controlled type 2 diabetes mellitus without complication, without long-term current use of insulin (H)        TAKE 1 TABLET(40 MG) BY MOUTH DAILY   Quantity:  90 tablet   Refills:  0       BENADRYL PO        Dose:  25 mg   Take 25 mg by mouth nightly as needed for sleep   Refills:  0       COMBIVENT RESPIMAT  MCG/ACT inhaler   Used for:  Other emphysema (H)   Generic drug:  Ipratropium-Albuterol        INHALE 1 PUFF INTO THE LUNGS FOUR TIMES DAILY AS NEEDED FOR SHORTNESS OF BREATH   Quantity:  4 g   Refills:  0       FLUoxetine 20 MG capsule   Commonly known as:  PROzac   Used for:  Other depression        TAKE 1 CAPSULE(20 MG) BY MOUTH DAILY   Quantity:  90 capsule   Refills:  0       lisinopril 10 MG tablet   Commonly known as:  PRINIVIL/ZESTRIL   Used for:  Benign essential hypertension        TAKE 1 TABLET(10 MG) BY MOUTH DAILY   Quantity:  30 tablet   Refills:  1       loratadine-pseudoePHEDrine  MG per 24 hr tablet   Commonly known as:  CLARITIN-D 24-hour        Dose:  1 tablet   Take 1 tablet by mouth daily   Refills:  0       * metFORMIN 500 MG tablet   Commonly known as:  GLUCOPHAGE   Used for:  Type 2 diabetes mellitus without complication, without long-term current use of insulin (H)        TAKE 1 TABLET BY MOUTH THREE TIMES DAILY WITH MEALS.   Quantity:  90 tablet   Refills:  0       * metFORMIN 500 MG tablet   Commonly known as:  GLUCOPHAGE    Used for:  Type 2 diabetes mellitus without complication, without long-term current use of insulin (H)        TAKE 1 TABLET BY MOUTH THREE TIMES DAILY WITH MEALS.   Quantity:  90 tablet   Refills:  5       pantoprazole 40 MG EC tablet   Commonly known as:  PROTONIX   Used for:  Abdominal pain, epigastric        TAKE 1 TABLET(40 MG) BY MOUTH TWICE DAILY BEFORE MEALS   Quantity:  60 tablet   Refills:  1       PRIMATENE ASTHMA 12.5-200 MG Tabs   Generic drug:  ePHEDrine-GuaiFENesin        Dose:  1 tablet   Take 1 tablet by mouth nightly as needed   Refills:  0       PROAIR  (90 Base) MCG/ACT inhaler   Used for:  Shortness of breath   Generic drug:  albuterol        INHALE 2 PUFFS BY MOUTH EVERY 4 HOURS AS NEEDED FOR SHORTNESS OF BREATH   Quantity:  8.5 g   Refills:  1       triamcinolone 0.5 % cream   Commonly known as:  KENALOG   Used for:  Venous stasis dermatitis of both lower extremities        Apply sparingly to affected area three times daily.   Quantity:  30 g   Refills:  0       * Notice:  This list has 2 medication(s) that are the same as other medications prescribed for you. Read the directions carefully, and ask your doctor or other care provider to review them with you.      CONTINUE these medicines which have NOT CHANGED        Dose / Directions    blood glucose monitoring lancets   Used for:  Type 2 diabetes mellitus with hyperglycemia, without long-term current use of insulin (H)        Use to test blood sugar 2 times daily.   Quantity:  102 each   Refills:  10       blood glucose monitoring meter device kit   Used for:  Type 2 diabetes mellitus with hyperglycemia, without long-term current use of insulin (H)        Use to test blood sugar 2 times daily .   Quantity:  1 kit   Refills:  0       blood glucose monitoring test strip   Commonly known as:  ACCU-CHEK YOLANDA PLUS   Used for:  Type 2 diabetes mellitus with hyperglycemia, without long-term current use of insulin (H)        Use to test  blood sugar 2 times daily.   Quantity:  100 each   Refills:  10       order for DME   Used for:  Venous stasis dermatitis of both lower extremities        Equipment being ordered: chang socks   Quantity:  2 each   Refills:  1                Protect others around you: Learn how to safely use, store and throw away your medicines at www.disposemymeds.org.         Follow-ups after your visit        Your next 10 appointments already scheduled     Nov 13, 2018  8:45 AM CST   (Arrive by 8:30 AM)   [facility]   [facility]    [address]  [facility] [place], [place] [address]   [phone]            Nov 28, 2018   Procedure with Rigo Coreas DO   [facility] ([facility] )    [address] [address] [phone]            Dec 11, 2018  9:30 AM CST   (Arrive by 9:15 AM)   Post Op with Rigo Coreas DO   [facility] )    [address] [facility] [address]   [phone]               Care Instructions        Further instructions from your care team         Discharge Instructions for Fine-Needle Thyroid Biopsy  You had a procedure called fine-needle thyroid biopsy. This biopsy was done to assess a nodule or cyst in your thyroid gland or enlargement of the thyroid. During the biopsy, a very thin needle is inserted through the skin into the gland. The needle is used to remove a small amount of tissue from the gland. (This may be done more than one time to be sure to get cells from all parts of the nodule.) Or the needle is used to drain fluid from a cyst. The tissue or fluid is then studied in a lab.     Thyroid Fine Needle Aspiration (FNA) Biopsy    The thyroid is a gland at the front of the neck. A thyroid fine needle aspiration (FNA) biopsy is a procedure to remove a small piece of tissue from your thyroid gland. The tissue is removed with a  small, hollow needle. The sample is sent to a lab to be examined to find a diagnosis.  Why thyroid FNA biopsy is done  Hard nodules can sometimes form inside the thyroid gland. You may notice a small bump in the gland area. Thyroid nodules are common. The nodules are often not dangerous. But in some cases they can be thyroid cancer. A thyroid FNA biopsy can test for cancer.  Risks of thyroid FNA biopsy  All procedures have some risks. The risks of thyroid FNA biopsy include:    Bleeding at the biopsy site    Infection    Damage to areas near the thyroid (rare)    Need for a repeat biopsy if the test result is in doubt  Getting ready for your procedure  Talk with your healthcare provider how to get ready. Tell him or her about all the medicines you take. This includes over-the-counter medicines such as ibuprofen. It also includes vitamins, herbs, and other supplements. You may need to stop taking some medicines before the procedure, such as blood thinners and aspirin. You should be able to eat and drink normally before the procedure.  On the day of your procedure  Your procedure may be done in a hospital or a medical clinic. You should be able to go home the same day. A typical procedure goes like this:    You may be given a medicine to help you relax, if needed.          A local anesthetic (numbing medicine) may be injected in the area in the front of your neck. Because the biopsy needle is so small, this may not be needed.    Your healthcare provider may use an ultrasound machine during the biopsy. This machine uses sound waves and a computer to show live images of the tissues in your neck. This helps your healthcare provider guide the needle to the right spot. A gel will be put on your neck to help the ultrasound wand maintain contact with your skin and enhance the signal generated by the sound waves.     The biopsy area will be cleaned. A thin, fine (narrow) needle will be put through your skin and into your  thyroid gland. You may feel a small amount of pain or pressure. The needle will be gently pushed into the nodule. A syringe attached to the needle will use gentle suction to remove a small piece of tissue from the nodule. This process may be repeated several times in the same nodule to get different samples from all parts of the nodule. You will need to be very still and not cough, talk, or swallow while the needle is put in.     The needle will then be removed. A small bandage will be put on the needle insertion site. The tissue will be sent to a pathology lab to be looked at for signs of cancer.  After your procedure  You ll likely be able to go home that day and can go back to your normal activities right away. You can remove your bandage within a few hours.  The site of the biopsy may be sore for a day or two after the procedure. You can take over-the-counter pain medicine if needed. Follow any other instructions that your healthcare provider gives you.  Follow-up care  Ask your healthcare provider when to expect to get your results from the biopsy. It may take several days. In some cases, the biopsy result may be inconclusive. This means the lab can t be sure if your nodule is cancer. You may need a repeat biopsy or surgery.  If your thyroid nodule is not cancer, you may not need any treatment. Your healthcare provider may want to keep track of your nodule. You may need another biopsy in the future.  If your nodule is cancer, you may need surgery or other treatment. Your healthcare provider will tell you more about what to expect and what needs to be done next.     When to call your healthcare provider  Call your healthcare provider right away if you have any of the following:    Fever of 100.4 F (38 C) or higher    Redness, swelling, bleeding, or fluid leaking from the biopsy site    Pain around the biopsy site that gets worse  Be sure you know how to reach your healthcare provider after office hours and on  weekends and holidays.   Date Last Reviewed: 2/9/2016 2000-2017 [employer]. [address] [place] [address]. All rights reserved. This information is not intended as a substitute for professional medical care. Always follow your healthcare professional's instructions.        Tips to take care of yourself at home:     You will have a small adhesive bandage on your biopsy site. Leave the bandage in place for 4 to 6 hours. After this time, you don't need to keep it covered.     If you feel discomfort after the biopsy, take over-the-counter pain medicine. Do not take aspirin.    Ask your healthcare provider when you can return to work and normal activities. This will likely be the same day as your procedure.  Getting your results  Your biopsy results may take a few days. When the results are ready, your healthcare provider will discuss them with you and tell you what, if anything, needs to be done next.   Follow-up  Make a follow-up appointment as directed by your healthcare provider.  When to call your healthcare provider  Call your healthcare provider right away if you have any of the following:    Bleeding that won t stop    Shortness of breath or trouble breathing    Fever of 100.4 F (38 C) or higher    Increasing pain, redness, tenderness, or drainage at the biopsy site    Swelling of the biopsy site  Be sure you understand what problems you should watch for and know how to reach the healthcare provider after hours and on weekends.    Date Last Reviewed: 2/9/2016 2000-2017 [employer]. [address] [place] [address]. All rights reserved. This information is not intended as a substitute for professional medical care. Always follow your healthcare professional's instructions.           Additional Information About Your Visit        MyChart Information     [employer] gives you secure access to your electronic health record. If you see a primary care provider, you  can also send messages to your care team and make appointments. If you have questions, please call your primary care clinic.  If you do not have a primary care provider, please call [phone] and they will assist you.        Care EveryWhere ID     This is your Care EveryWhere ID. This could be used by other organizations to access your [facility] medical records  [id number]        Your Vitals Were     Blood Pressure Respirations Pulse Oximetry             127/92 (BP Location: Left arm) 16 99%          Primary Care Provider Office Phone # Fax #    Favian Recinos [phone]      Equal Access to Services     [facility]: Hadii aad gulshan hadasho Soomaali, waaxda luqadaha, qaybta kaalmada belkysyadang, madai keller . So [facility] [phone].    ATENCIÓN: Si habla español, tiene a sanchez disposición servicios gratuitos de asistencia lingüística. [facility] [phone].    We comply with applicable federal civil rights laws and [place] laws. We do not discriminate on the basis of race, color, national origin, age, disability, sex, sexual orientation, or gender identity.            Thank you!     Thank you for choosing [facility] for your care. Our goal is always to provide you with excellent care. Hearing back from our patients is one way we can continue to improve our services. Please take a few minutes to complete the written survey that you may receive in the mail after you visit with us. Thank you!             Medication List: This is a list of all your medications and when to take them. Check marks below indicate your daily home schedule. Keep this list as a reference.      Medications           Morning Afternoon Evening Bedtime As Needed    ADVIL PO   Take 600-800 mg by mouth every 8 hours as needed for moderate pain                                atorvastatin 40 MG tablet   Commonly known as:  LIPITOR   TAKE 1 TABLET(40 MG) BY MOUTH DAILY                                 BENADRYL PO   Take 25 mg by mouth nightly as needed for sleep                                blood glucose monitoring lancets   Use to test blood sugar 2 times daily.                                blood glucose monitoring meter device kit   Use to test blood sugar 2 times daily .                                blood glucose monitoring test strip   Commonly known as:  ACCU-CHEK YOLANDA PLUS   Use to test blood sugar 2 times daily.                                COMBIVENT RESPIMAT  MCG/ACT inhaler   INHALE 1 PUFF INTO THE LUNGS FOUR TIMES DAILY AS NEEDED FOR SHORTNESS OF BREATH   Generic drug:  Ipratropium-Albuterol                                FLUoxetine 20 MG capsule   Commonly known as:  PROzac   TAKE 1 CAPSULE(20 MG) BY MOUTH DAILY                                lisinopril 10 MG tablet   Commonly known as:  PRINIVIL/ZESTRIL   TAKE 1 TABLET(10 MG) BY MOUTH DAILY                                loratadine-pseudoePHEDrine  MG per 24 hr tablet   Commonly known as:  CLARITIN-D 24-hour   Take 1 tablet by mouth daily                                * metFORMIN 500 MG tablet   Commonly known as:  GLUCOPHAGE   TAKE 1 TABLET BY MOUTH THREE TIMES DAILY WITH MEALS.                                * metFORMIN 500 MG tablet   Commonly known as:  GLUCOPHAGE   TAKE 1 TABLET BY MOUTH THREE TIMES DAILY WITH MEALS.                                order for DME   Equipment being ordered: chang socks                                pantoprazole 40 MG EC tablet   Commonly known as:  PROTONIX   TAKE 1 TABLET(40 MG) BY MOUTH TWICE DAILY BEFORE MEALS                                PRIMATENE ASTHMA 12.5-200 MG Tabs   Take 1 tablet by mouth nightly as needed   Generic drug:  ePHEDrine-GuaiFENesin                                PROAIR  (90 Base) MCG/ACT inhaler   INHALE 2 PUFFS BY MOUTH EVERY 4 HOURS AS NEEDED FOR SHORTNESS OF BREATH   Generic drug:  albuterol                                triamcinolone 0.5 % cream   Commonly  known as:  KENALOG   Apply sparingly to affected area three times daily.                                * Notice:  This list has 2 medication(s) that are the same as other medications prescribed for you. Read the directions carefully, and ask your doctor or other care provider to review them with you.

## 2018-11-05 NOTE — DISCHARGE INSTRUCTIONS
Discharge Instructions for Fine-Needle Thyroid Biopsy  You had a procedure called fine-needle thyroid biopsy. This biopsy was done to assess a nodule or cyst in your thyroid gland or enlargement of the thyroid. During the biopsy, a very thin needle is inserted through the skin into the gland. The needle is used to remove a small amount of tissue from the gland. (This may be done more than one time to be sure to get cells from all parts of the nodule.) Or the needle is used to drain fluid from a cyst. The tissue or fluid is then studied in a lab.     Thyroid Fine Needle Aspiration (FNA) Biopsy    The thyroid is a gland at the front of the neck. A thyroid fine needle aspiration (FNA) biopsy is a procedure to remove a small piece of tissue from your thyroid gland. The tissue is removed with a small, hollow needle. The sample is sent to a lab to be examined to find a diagnosis.  Why thyroid FNA biopsy is done  Hard nodules can sometimes form inside the thyroid gland. You may notice a small bump in the gland area. Thyroid nodules are common. The nodules are often not dangerous. But in some cases they can be thyroid cancer. A thyroid FNA biopsy can test for cancer.  Risks of thyroid FNA biopsy  All procedures have some risks. The risks of thyroid FNA biopsy include:    Bleeding at the biopsy site    Infection    Damage to areas near the thyroid (rare)    Need for a repeat biopsy if the test result is in doubt  Getting ready for your procedure  Talk with your healthcare provider how to get ready. Tell him or her about all the medicines you take. This includes over-the-counter medicines such as ibuprofen. It also includes vitamins, herbs, and other supplements. You may need to stop taking some medicines before the procedure, such as blood thinners and aspirin. You should be able to eat and drink normally before the procedure.  On the day of your procedure  Your procedure may be done in a hospital or a medical clinic. You  should be able to go home the same day. A typical procedure goes like this:    You may be given a medicine to help you relax, if needed.          A local anesthetic (numbing medicine) may be injected in the area in the front of your neck. Because the biopsy needle is so small, this may not be needed.    Your healthcare provider may use an ultrasound machine during the biopsy. This machine uses sound waves and a computer to show live images of the tissues in your neck. This helps your healthcare provider guide the needle to the right spot. A gel will be put on your neck to help the ultrasound wand maintain contact with your skin and enhance the signal generated by the sound waves.     The biopsy area will be cleaned. A thin, fine (narrow) needle will be put through your skin and into your thyroid gland. You may feel a small amount of pain or pressure. The needle will be gently pushed into the nodule. A syringe attached to the needle will use gentle suction to remove a small piece of tissue from the nodule. This process may be repeated several times in the same nodule to get different samples from all parts of the nodule. You will need to be very still and not cough, talk, or swallow while the needle is put in.     The needle will then be removed. A small bandage will be put on the needle insertion site. The tissue will be sent to a pathology lab to be looked at for signs of cancer.  After your procedure  You ll likely be able to go home that day and can go back to your normal activities right away. You can remove your bandage within a few hours.  The site of the biopsy may be sore for a day or two after the procedure. You can take over-the-counter pain medicine if needed. Follow any other instructions that your healthcare provider gives you.  Follow-up care  Ask your healthcare provider when to expect to get your results from the biopsy. It may take several days. In some cases, the biopsy result may be inconclusive.  This means the lab can t be sure if your nodule is cancer. You may need a repeat biopsy or surgery.  If your thyroid nodule is not cancer, you may not need any treatment. Your healthcare provider may want to keep track of your nodule. You may need another biopsy in the future.  If your nodule is cancer, you may need surgery or other treatment. Your healthcare provider will tell you more about what to expect and what needs to be done next.     When to call your healthcare provider  Call your healthcare provider right away if you have any of the following:    Fever of 100.4 F (38 C) or higher    Redness, swelling, bleeding, or fluid leaking from the biopsy site    Pain around the biopsy site that gets worse  Be sure you know how to reach your healthcare provider after office hours and on weekends and holidays.   Date Last Reviewed: 2/9/2016 2000-2017 The [employer]. [address] [place] [address]. All rights reserved. This information is not intended as a substitute for professional medical care. Always follow your healthcare professional's instructions.        Tips to take care of yourself at home:     You will have a small adhesive bandage on your biopsy site. Leave the bandage in place for 4 to 6 hours. After this time, you don't need to keep it covered.     If you feel discomfort after the biopsy, take over-the-counter pain medicine. Do not take aspirin.    Ask your healthcare provider when you can return to work and normal activities. This will likely be the same day as your procedure.  Getting your results  Your biopsy results may take a few days. When the results are ready, your healthcare provider will discuss them with you and tell you what, if anything, needs to be done next.   Follow-up  Make a follow-up appointment as directed by your healthcare provider.  When to call your healthcare provider  Call your healthcare provider right away if you have any of the following:    Bleeding  that won t stop    Shortness of breath or trouble breathing    Fever of 100.4 F (38 C) or higher    Increasing pain, redness, tenderness, or drainage at the biopsy site    Swelling of the biopsy site  Be sure you understand what problems you should watch for and know how to reach the healthcare provider after hours and on weekends.    Date Last Reviewed: 2/9/2016 2000-2017 [employer]. [address] [place] [address]. All rights reserved. This information is not intended as a substitute for professional medical care. Always follow your healthcare professional's instructions.

## 2018-11-05 NOTE — IP AVS SNAPSHOT
[facility]    [address] [place] [address]    Phone:  [phone]                                       After Visit Summary   11/5/2018    Sola Gan    MRN: [id number]           After Visit Summary Signature Page     I have received my discharge instructions, and my questions have been answered. I have discussed any challenges I see with this plan with the nurse or doctor.    ..........................................................................................................................................  Patient/Patient Representative Signature      ..........................................................................................................................................  Patient Representative Print Name and Relationship to Patient    ..................................................               ................................................  Date                                   Time    ..........................................................................................................................................  Reviewed by Signature/Title    ...................................................              ..............................................  Date                                               Time          22EPIC Rev 08/18

## 2018-11-06 ENCOUNTER — TELEPHONE (OUTPATIENT)
Dept: INTERVENTIONAL RADIOLOGY/VASCULAR | Facility: HOSPITAL | Age: 68
End: 2018-11-06

## 2018-11-06 DIAGNOSIS — F32.89 OTHER DEPRESSION: ICD-10-CM

## 2018-11-06 DIAGNOSIS — R10.13 ABDOMINAL PAIN, EPIGASTRIC: ICD-10-CM

## 2018-11-06 DIAGNOSIS — I10 BENIGN ESSENTIAL HYPERTENSION: ICD-10-CM

## 2018-11-06 NOTE — TELEPHONE ENCOUNTER
"Phoned patient to followup on thyroid biopsy from yesterday.  Denies any drainage from site, its tender, \"but I haven't used any pain meds. I did use those ice packs\".  Reminded that Dr Recinos will call with biopsy results, but if she has any questions or concerns she can call our office.  Number provided.      "

## 2018-11-07 DIAGNOSIS — E11.9 TYPE 2 DIABETES MELLITUS WITHOUT COMPLICATION, WITHOUT LONG-TERM CURRENT USE OF INSULIN (H): ICD-10-CM

## 2018-11-07 RX ORDER — LISINOPRIL 10 MG/1
TABLET ORAL
Qty: 30 TABLET | Refills: 5 | Status: SHIPPED | OUTPATIENT
Start: 2018-11-07

## 2018-11-07 RX ORDER — PANTOPRAZOLE SODIUM 40 MG/1
TABLET, DELAYED RELEASE ORAL
Qty: 60 TABLET | Refills: 0 | OUTPATIENT
Start: 2018-11-07

## 2018-11-07 RX ORDER — LISINOPRIL 10 MG/1
TABLET ORAL
Qty: 30 TABLET | Refills: 0 | OUTPATIENT
Start: 2018-11-07

## 2018-11-07 RX ORDER — PANTOPRAZOLE SODIUM 40 MG/1
TABLET, DELAYED RELEASE ORAL
Qty: 60 TABLET | Refills: 9 | Status: SHIPPED | OUTPATIENT
Start: 2018-11-07 | End: 2020-02-12

## 2018-11-07 NOTE — TELEPHONE ENCOUNTER
Metformin   Last Written Prescription Date: 10/17/18  Last Fill Quantity: 90 # of Refills: 5  Last Office Visit: 10/10/18

## 2018-11-08 LAB — COPATH REPORT: NORMAL

## 2018-11-13 ENCOUNTER — RADIANT APPOINTMENT (OUTPATIENT)
Dept: GENERAL RADIOLOGY | Facility: OTHER | Age: 68
End: 2018-11-13
Attending: FAMILY MEDICINE
Payer: MEDICARE

## 2018-11-13 ENCOUNTER — OFFICE VISIT (OUTPATIENT)
Dept: FAMILY MEDICINE | Facility: OTHER | Age: 68
End: 2018-11-13
Attending: FAMILY MEDICINE
Payer: COMMERCIAL

## 2018-11-13 VITALS
OXYGEN SATURATION: 99 % | TEMPERATURE: 97.1 F | SYSTOLIC BLOOD PRESSURE: 128 MMHG | HEART RATE: 67 BPM | WEIGHT: 214 LBS | DIASTOLIC BLOOD PRESSURE: 72 MMHG | BODY MASS INDEX: 41.11 KG/M2

## 2018-11-13 DIAGNOSIS — E04.1 THYROID NODULE: ICD-10-CM

## 2018-11-13 DIAGNOSIS — Z01.818 PREOP GENERAL PHYSICAL EXAM: Primary | ICD-10-CM

## 2018-11-13 DIAGNOSIS — C18.7 CANCER OF SIGMOID COLON (H): ICD-10-CM

## 2018-11-13 DIAGNOSIS — J43.8 OTHER EMPHYSEMA (H): Chronic | ICD-10-CM

## 2018-11-13 DIAGNOSIS — K43.9 VENTRAL HERNIA WITHOUT OBSTRUCTION OR GANGRENE: ICD-10-CM

## 2018-11-13 LAB
ANION GAP SERPL CALCULATED.3IONS-SCNC: 8 MMOL/L (ref 3–14)
BASOPHILS # BLD AUTO: 0 10E9/L (ref 0–0.2)
BASOPHILS NFR BLD AUTO: 0.5 %
BUN SERPL-MCNC: 12 MG/DL (ref 7–30)
CALCIUM SERPL-MCNC: 8.5 MG/DL (ref 8.5–10.1)
CHLORIDE SERPL-SCNC: 101 MMOL/L (ref 94–109)
CO2 SERPL-SCNC: 27 MMOL/L (ref 20–32)
CREAT SERPL-MCNC: 0.7 MG/DL (ref 0.52–1.04)
DIFFERENTIAL METHOD BLD: NORMAL
EOSINOPHIL # BLD AUTO: 0.2 10E9/L (ref 0–0.7)
EOSINOPHIL NFR BLD AUTO: 3.6 %
ERYTHROCYTE [DISTWIDTH] IN BLOOD BY AUTOMATED COUNT: 14.8 % (ref 10–15)
GFR SERPL CREATININE-BSD FRML MDRD: 84 ML/MIN/1.7M2
GLUCOSE SERPL-MCNC: 124 MG/DL (ref 70–99)
HCT VFR BLD AUTO: 43.8 % (ref 35–47)
HGB BLD-MCNC: 14.1 G/DL (ref 11.7–15.7)
LYMPHOCYTES # BLD AUTO: 1.2 10E9/L (ref 0.8–5.3)
LYMPHOCYTES NFR BLD AUTO: 29 %
MCH RBC QN AUTO: 28.2 PG (ref 26.5–33)
MCHC RBC AUTO-ENTMCNC: 32.2 G/DL (ref 31.5–36.5)
MCV RBC AUTO: 88 FL (ref 78–100)
MONOCYTES # BLD AUTO: 0.5 10E9/L (ref 0–1.3)
MONOCYTES NFR BLD AUTO: 11 %
NEUTROPHILS # BLD AUTO: 2.4 10E9/L (ref 1.6–8.3)
NEUTROPHILS NFR BLD AUTO: 55.9 %
PLATELET # BLD AUTO: 208 10E9/L (ref 150–450)
POTASSIUM SERPL-SCNC: 3.5 MMOL/L (ref 3.4–5.3)
RBC # BLD AUTO: 5 10E12/L (ref 3.8–5.2)
SODIUM SERPL-SCNC: 136 MMOL/L (ref 133–144)
WBC # BLD AUTO: 4.2 10E9/L (ref 4–11)

## 2018-11-13 PROCEDURE — 93005 ELECTROCARDIOGRAM TRACING: CPT

## 2018-11-13 PROCEDURE — 71046 X-RAY EXAM CHEST 2 VIEWS: CPT | Mod: TC,FY

## 2018-11-13 PROCEDURE — 99214 OFFICE O/P EST MOD 30 MIN: CPT | Mod: 25 | Performed by: FAMILY MEDICINE

## 2018-11-13 PROCEDURE — 80048 BASIC METABOLIC PNL TOTAL CA: CPT | Mod: ZL | Performed by: FAMILY MEDICINE

## 2018-11-13 PROCEDURE — G0463 HOSPITAL OUTPT CLINIC VISIT: HCPCS | Mod: 25

## 2018-11-13 PROCEDURE — 36415 COLL VENOUS BLD VENIPUNCTURE: CPT | Mod: ZL | Performed by: FAMILY MEDICINE

## 2018-11-13 PROCEDURE — 85025 COMPLETE CBC W/AUTO DIFF WBC: CPT | Mod: ZL | Performed by: FAMILY MEDICINE

## 2018-11-13 PROCEDURE — 93010 ELECTROCARDIOGRAM REPORT: CPT | Performed by: INTERNAL MEDICINE

## 2018-11-13 ASSESSMENT — ANXIETY QUESTIONNAIRES
4. TROUBLE RELAXING: SEVERAL DAYS
1. FEELING NERVOUS, ANXIOUS, OR ON EDGE: NOT AT ALL
7. FEELING AFRAID AS IF SOMETHING AWFUL MIGHT HAPPEN: NOT AT ALL
6. BECOMING EASILY ANNOYED OR IRRITABLE: NOT AT ALL
IF YOU CHECKED OFF ANY PROBLEMS ON THIS QUESTIONNAIRE, HOW DIFFICULT HAVE THESE PROBLEMS MADE IT FOR YOU TO DO YOUR WORK, TAKE CARE OF THINGS AT HOME, OR GET ALONG WITH OTHER PEOPLE: NOT DIFFICULT AT ALL
GAD7 TOTAL SCORE: 2
2. NOT BEING ABLE TO STOP OR CONTROL WORRYING: NOT AT ALL
3. WORRYING TOO MUCH ABOUT DIFFERENT THINGS: NOT AT ALL
5. BEING SO RESTLESS THAT IT IS HARD TO SIT STILL: SEVERAL DAYS

## 2018-11-13 ASSESSMENT — PATIENT HEALTH QUESTIONNAIRE - PHQ9: SUM OF ALL RESPONSES TO PHQ QUESTIONS 1-9: 0

## 2018-11-13 ASSESSMENT — PAIN SCALES - GENERAL: PAINLEVEL: NO PAIN (0)

## 2018-11-13 NOTE — NURSING NOTE
"Chief Complaint   Patient presents with     Pre-Op Exam       Initial /72  Pulse 67  Temp 97.1  F (36.2  C) (Tympanic)  Wt 214 lb (97.1 kg)  SpO2 99%  BMI 41.11 kg/m2 Estimated body mass index is 41.11 kg/(m^2) as calculated from the following:    Height as of 10/10/18: 5' 0.5\" (1.537 m).    Weight as of this encounter: 214 lb (97.1 kg).  Medication Reconciliation: complete    Amada Klein MA    "
Reji

## 2018-11-13 NOTE — LETTER
My Depression Action Plan  Name: Sola Gan   Date of Birth 1950  Date: 11/8/2018    My doctor: Favian Recinos   My clinic: [address] [place] [facility] [address]  [phone]          GREEN    ZONE   Good Control    What it looks like:     Things are going generally well. You have normal up s and down s. You may even feel depressed from time to time, but bad moods usually last less than a day.   What you need to do:  1. Continue to care for yourself (see self care plan)  2. Check your depression survival kit and update it as needed  3. Follow your physician s recommendations including any medication.  4. Do not stop taking medication unless you consult with your physician first.           YELLOW         ZONE Getting Worse    What it looks like:     Depression is starting to interfere with your life.     It may be hard to get out of bed; you may be starting to isolate yourself from others.    Symptoms of depression are starting to last most all day and this has happened for several days.     You may have suicidal thoughts but they are not constant.   What you need to do:     1. Call your care team, your response to treatment will improve if you keep your care team informed of your progress. Yellow periods are signs an adjustment may need to be made.     2. Continue your self-care, even if you have to fake it!    3. Talk to someone in your support network    4. Open up your depression survival kit           RED    ZONE Medical Alert - Get Help    What it looks like:     Depression is seriously interfering with your life.     You may experience these or other symptoms: You can t get out of bed most days, can t work or engage in other necessary activities, you have trouble taking care of basic hygiene, or basic responsibilities, thoughts of suicide or death that will not go away, self-injurious behavior.     What you need to do:  1. Call your care team and  request a same-day appointment. If they are not available (weekends or after hours) call your local crisis line, emergency room or 911.            Depression Self Care Plan / Survival Kit    Self-Care for Depression  Here s the deal. Your body and mind are really not as separate as most people think.  What you do and think affects how you feel and how you feel influences what you do and think. This means if you do things that people who feel good do, it will help you feel better.  Sometimes this is all it takes.  There is also a place for medication and therapy depending on how severe your depression is, so be sure to consult with your medical provider and/ or Behavioral Health Consultant if your symptoms are worsening or not improving.     In order to better manage my stress, I will:    Exercise  Get some form of exercise, every day. This will help reduce pain and release endorphins, the  feel good  chemicals in your brain. This is almost as good as taking antidepressants!  This is not the same as joining a gym and then never going! (they count on that by the way ) It can be as simple as just going for a walk or doing some gardening, anything that will get you moving.      Hygiene   Maintain good hygiene (Get out of bed in the morning, Make your bed, Brush your teeth, Take a shower, and Get dressed like you were going to work, even if you are unemployed).  If your clothes don't fit try to get ones that do.    Diet  I will strive to eat foods that are good for me, drink plenty of water, and avoid excessive sugar, caffeine, alcohol, and other mood-altering substances.  Some foods that are helpful in depression are: complex carbohydrates, B vitamins, flaxseed, fish or fish oil, fresh fruits and vegetables.    Psychotherapy  I agree to participate in Individual Therapy (if recommended).    Medication  If prescribed medications, I agree to take them.  Missing doses can result in serious side effects.  I understand that  drinking alcohol, or other illicit drug use, may cause potential side effects.  I will not stop my medication abruptly without first discussing it with my provider.    Staying Connected With Others  I will stay in touch with my friends, family members, and my primary care provider/team.    Use your imagination  Be creative.  We all have a creative side; it doesn t matter if it s oil painting, sand castles, or mud pies! This will also kick up the endorphins.    Witness Beauty  (AKA stop and smell the roses) Take a look outside, even in mid-winter. Notice colors, textures. Watch the squirrels and birds.     Service to others  Be of service to others.  There is always someone else in need.  By helping others we can  get out of ourselves  and remember the really important things.  This also provides opportunities for practicing all the other parts of the program.    Humor  Laugh and be silly!  Adjust your TV habits for less news and crime-drama and more comedy.    Control your stress  Try breathing deep, massage therapy, biofeedback, and meditation. Find time to relax each day.     My support system    Clinic Contact:  Phone number:    Contact 1:  Phone number:    Contact 2:  Phone number:    L[employer]-day/:  Phone number:    Therapist:  Phone number:    Local crisis center:    Phone number:    Other community support:  Phone number:

## 2018-11-13 NOTE — PROGRESS NOTES
[address]  [facility] [address]  [phone]  Dept: [phone]    PRE-OP EVALUATION:  Today's date: 2018    Sola Gan (: 1950) presents for pre-operative evaluation assessment as requested by Dr. Coreas.  She requires evaluation and anesthesia risk assessment prior to undergoing surgery/procedure for treatment of port removal and a hernia repair.    Proposed Surgery/ Procedure: Removal of port as well as a hernia removal  Date of Surgery/ Procedure: 18  Time of Surgery/ Procedure: [facility]  Hospital/Surgical Facility: [facility] [place]  Fax number for surgical facility: unknown  Primary Physician: Favian Recinos  Type of Anesthesia Anticipated: to be determined    Patient has a Health Care Directive or Living Will:  YES current on file    1. YES - Do you have a history of heart attack, stroke, stent, bypass or surgery on an artery in the head, neck, heart or legs? Has a port from chemo that runs thorough an artery in the neck  2. NO - Do you ever have any pain or discomfort in your chest?  3. NO - Do you have a history of  Heart Failure?  4. NO - Are you troubled by shortness of breath when: walking on the level, up a slight hill or at night?  5. NO - Do you currently have a cold, bronchitis or other respiratory infection?  6. NO - Do you have a cough, shortness of breath or wheezing?  7. NO - Do you sometimes get pains in the calves of your legs when you walk?  8. NO - Do you or anyone in your family have previous history of blood clots?  9. NO - Do you or does anyone in your family have a serious bleeding problem such as prolonged bleeding following surgeries or cuts?  10. NO - Have you ever had problems with anemia or been told to take iron pills?  11. NO - Have you had any abnormal blood loss such as black, tarry or bloody stools, or abnormal vaginal bleeding?  12. NO - Have you ever had a blood transfusion?  13. NO - Have you or any of your relatives  ever had problems with anesthesia?  14. NO - Do you have sleep apnea, excessive snoring or daytime drowsiness?  15. NO - Do you have any prosthetic heart valves?  16. NO - Do you have prosthetic joints?  17. NO - Is there any chance that you may be pregnant?      HPI:     HPI related to upcoming procedure: ventral hernia and port removal.        See problem list for active medical problems.  Problems all longstanding and stable, except as noted/documented.  See ROS for pertinent symptoms related to these conditions.                                                                                                                                                          .    MEDICAL HISTORY:     Patient Active Problem List    Diagnosis Date Noted     Chest pain 10/25/2017     Priority: Medium     Hypokalemia 10/25/2017     Priority: Medium     Adenocarcinoma of sigmoid colon (H) 09/05/2017     Priority: Medium     Colon tumor 07/17/2017     Priority: Medium     Controlled type 2 diabetes mellitus without complication, without long-term current use of insulin (H) 05/18/2017     Priority: Medium     Seasonal allergic rhinitis due to pollen 04/13/2017     Priority: Medium     ACP (advance care planning) 03/22/2017     Priority: Medium     Advance Care Planning 3/22/2017: ACP Review of Chart / Resources Provided:  Reviewed chart for advance care plan.  Sola Gan has no plan or code status on file. Discussed available resources and provided with information. Confirmed code status reflects current choices pending further ACP discussions.  Confirmed/documented legally designated decision makers.  Added by Tia Virgen           Depression 04/17/2014     Priority: Medium     COPD (chronic obstructive pulmonary disease) probable 03/30/2014     Priority: Medium     Acute bronchitis 03/30/2014     Priority: Medium     Morbid obesity (H) 03/30/2014     Priority: Medium     Essential hypertension 03/30/2014     Priority:  Medium     Stasis dermatitis of both legs 03/30/2014     Priority: Medium     MORA (obstructive sleep apnea) 03/30/2014     Priority: Medium     Acute bronchitis with asthma 03/30/2014     Priority: Medium      Past Medical History:   Diagnosis Date     COPD (chronic obstructive pulmonary disease) (H)      Diabetes (H)      H/O sleep apnea     tonsils, adnoids, and uvula removed     Uncomplicated asthma      Past Surgical History:   Procedure Laterality Date     APPENDECTOMY       CHOLECYSTECTOMY       COLONOSCOPY N/A 6/21/2017    Procedure: COLONOSCOPY;  COLONOSCOPY WITH POLYPECTOMY, BIOPSY, ENDOSCOPIC MARKER TATOOING;  Surgeon: Rigo Coreas DO;  Location: [facility] OR     COLONOSCOPY N/A 8/16/2018    Procedure: COLONOSCOPY;  COLONOSCOPY with biopsy;  Surgeon: Rigo Coreas DO;  Location: [facility] OR     ENT SURGERY      for MORA     GYN SURGERY       HERNIA REPAIR       INSERT PORT VASCULAR ACCESS N/A 9/8/2017    Procedure: INSERT PORT VASCULAR ACCESS;  PORT-A-CATH PLACEMENT LEFT INTERNAL JUGULAR;  Surgeon: Awais Ding MD;  Location: [facility] OR     Current Outpatient Prescriptions   Medication Sig Dispense Refill     atorvastatin (LIPITOR) 40 MG tablet TAKE 1 TABLET(40 MG) BY MOUTH DAILY 90 tablet 0     blood glucose monitoring (ACCU-CHEK YOLANDA PLUS) meter device kit Use to test blood sugar 2 times daily  . 1 kit 0     blood glucose monitoring (ACCU-CHEK YOLANDA PLUS) test strip Use to test blood sugar 2 times daily. 100 each 10     blood glucose monitoring (ACCU-CHEK FASTCLIX) lancets Use to test blood sugar 2 times daily. 102 each 10     COMBIVENT RESPIMAT  MCG/ACT inhaler INHALE 1 PUFF INTO THE LUNGS FOUR TIMES DAILY AS NEEDED FOR SHORTNESS OF BREATH 4 g 0     DiphenhydrAMINE HCl (BENADRYL PO) Take 25 mg by mouth nightly as needed for sleep       ePHEDrine-GuaiFENesin (PRIMATENE ASTHMA) 12.5-200 MG TABS Take 1 tablet by mouth nightly as needed        FLUoxetine (PROZAC) 20 MG capsule Take 1 capsule (20  mg) by mouth daily 90 capsule 0     Ibuprofen (ADVIL PO) Take 600-800 mg by mouth every 8 hours as needed for moderate pain        lisinopril (PRINIVIL/ZESTRIL) 10 MG tablet TAKE 1 TABLET(10 MG) BY MOUTH DAILY 30 tablet 5     loratadine-pseudoePHEDrine (CLARITIN-D 24-HOUR)  MG per 24 hr tablet Take 1 tablet by mouth daily       metFORMIN (GLUCOPHAGE) 500 MG tablet TAKE 1 TABLET BY MOUTH THREE TIMES DAILY WITH MEALS. 90 tablet 5     metFORMIN (GLUCOPHAGE) 500 MG tablet TAKE 1 TABLET BY MOUTH THREE TIMES DAILY WITH MEALS. 90 tablet 0     order for DME Equipment being ordered: chang socks 2 each 1     pantoprazole (PROTONIX) 40 MG EC tablet TAKE 1 TABLET(40 MG) BY MOUTH TWICE DAILY BEFORE MEALS 60 tablet 9     PROAIR  (90 Base) MCG/ACT inhaler INHALE 2 PUFFS BY MOUTH EVERY 4 HOURS AS NEEDED FOR SHORTNESS OF BREATH 8.5 g 1     triamcinolone (KENALOG) 0.5 % cream Apply sparingly to affected area three times daily. 30 g 0     OTC products: None, except as noted above    Allergies   Allergen Reactions     Acyclovir Nausea     Sulfa Drugs Hives      Latex Allergy: NO    Social History   Substance Use Topics     Smoking status: Current Every Day Smoker     Packs/day: 0.75     Years: 20.00     Smokeless tobacco: Never Used     Alcohol use No     History   Drug Use No       REVIEW OF SYSTEMS:   CONSTITUTIONAL: NEGATIVE for fever, chills, change in weight  INTEGUMENTARY/SKIN: NEGATIVE for worrisome rashes, moles or lesions  EYES: NEGATIVE for vision changes or irritation  ENT/MOUTH: NEGATIVE for ear, mouth and throat problems  RESP: NEGATIVE for significant cough or SOB  BREAST: NEGATIVE for masses, tenderness or discharge  CV: NEGATIVE for chest pain, palpitations or peripheral edema  GI: NEGATIVE for nausea, abdominal pain, heartburn, or change in bowel habits  : NEGATIVE for frequency, dysuria, or hematuria  MUSCULOSKELETAL: NEGATIVE for significant arthralgias or myalgia  NEURO: NEGATIVE for weakness, dizziness  or paresthesias  ENDOCRINE: NEGATIVE for temperature intolerance, skin/hair changes  HEME: NEGATIVE for bleeding problems  PSYCHIATRIC: NEGATIVE for changes in mood or affect  Functional capacity:  Reasonable capacity with never any concern of chest pain or cardiac concerns.     EXAM:   There were no vitals taken for this visit.    GENERAL APPEARANCE: healthy, alert and no distress     EYES: EOMI, PERRL     HENT: ear canals and TM's normal and nose and mouth without ulcers or lesions     NECK: no adenopathy, no asymmetry, masses, or scars and thyroid normal to palpation     RESP: lungs clear to auscultation - no rales, rhonchi or wheezes     CV: regular rates and rhythm, normal S1 S2, no S3 or S4 and no murmur, click or rub     ABDOMEN:  soft, nontender, no mass.  I didn't reevaluate the hernia closely.     MS: extremities normal- no gross deformities noted, no evidence of inflammation in joints, FROM in all extremities.     SKIN: no suspicious lesions or rashes     NEURO: Normal strength and tone, sensory exam grossly normal, mentation intact and speech normal     PSYCH: mentation appears normal. and affect normal/bright     LYMPHATICS: No cervical adenopathy    DIAGNOSTICS:   EKG: appears normal, NSR, normal axis, normal intervals, no acute ST/T changes c/w ischemia, no LVH by voltage criteria, cxr clear.  Cbc normal.  Bmp including glucose pending and will be forwarded.  Last a1c 1 month ago and 6.1.      Recent Labs   Lab Test  10/10/18   1102  07/31/18   1516  05/03/18   1330   10/26/17   0512   06/27/17   1237   HGB   --   15.3   --    --   13.2   < >  16.2*   PLT   --   229   --    --   153   < >  283   INR   --    --    --    --    --    --   1.03   NA  134  139   --    --    --    < >  136   POTASSIUM  3.6  4.0   --    < >   --    < >  3.7   CR  0.80  0.73   --    --    --    < >  0.69   A1C  6.1*   --   5.8   < >   --    --    --     < > = values in this interval not displayed.        IMPRESSION:   Reason  for surgery/procedure: ventral hernia and port removal.   Diagnosis/reason for consult: preop clearance for same.     The proposed surgical procedure is considered LOW risk.    REVISED CARDIAC RISK INDEX  The patient has the following serious cardiovascular risks for perioperative complications such as (MI, PE, VFib and 3  AV Block):  No serious cardiac risks  INTERPRETATION: 0 risks: Class I (very low risk - 0.4% complication rate)    The patient has the following additional risks for perioperative complications:  No identified additional risks      ICD-10-CM    1. Preop general physical exam Z01.818        RECOMMENDATIONS:     --Consult hospital rounder / IM to assist post-op medical management    --Patient is to take all scheduled medications on the day of surgery EXCEPT for modifications listed below.    APPROVAL GIVEN to proceed with proposed procedure, without further diagnostic evaluation       Signed Electronically by: Favian Recinos MD    Copy of this evaluation report is provided to requesting physician.    [facility] Preop Guidelines    Revised Cardiac Risk Index

## 2018-11-13 NOTE — MR AVS SNAPSHOT
After Visit Summary   11/13/2018    Sola Gan    MRN: [id number]           Patient Information     Date Of Birth          1950        Visit Information        Provider Department      11/13/2018 8:45 AM Favian Recinos MD [facility]        Today's Diagnoses     Preop general physical exam    -  1    Adenocarcinoma of sigmoid colon (H)        Ventral hernia without obstruction or gangrene        Other emphysema (H)        Thyroid nodule          Care Instructions      Before Your Surgery      Call your surgeon if there is any change in your health. This includes signs of a cold or flu (such as a sore throat, runny nose, cough, rash or fever).    Do not smoke, drink alcohol or take over the counter medicine (unless your surgeon or primary care doctor tells you to) for the 24 hours before and after surgery.    If you take prescribed drugs: Follow your doctor s orders about which medicines to take and which to stop until after surgery.    Eating and drinking prior to surgery: follow the instructions from your surgeon    Take a shower or bath the night before surgery. Use the soap your surgeon gave you to gently clean your skin. If you do not have soap from your surgeon, use your regular soap. Do not shave or scrub the surgery site.  Wear clean pajamas and have clean sheets on your bed.           Follow-ups after your visit        Your next 10 appointments already scheduled     Nov 28, 2018   Procedure with Rigo Coreas DO   [facility] ([address] [address]   [phone]            Dec 11, 2018  9:30 AM CST   (Arrive by 9:15 AM)   Post Op with Rigo Coreas DO   [facility]    [facility] [address]   [phone]              Who to contact     If you have questions or need follow up information about today's clinic visit or  your schedule please contact [facility] directly at [phone].  Normal or non-critical lab and imaging results will be communicated to you by MyChart, letter or phone within 4 business days after the clinic has received the results. If you do not hear from us within 7 days, please contact the clinic through 3 day [facility] or phone. If you have a critical or abnormal lab result, we will notify you by phone as soon as possible.  Submit refill requests through [facility] or call your pharmacy and they will forward the refill request to us. Please allow 3 business days for your refill to be completed.          Additional Information About Your Visit        3 day [facility]shar[facility] Upon a Time Information     [facility] gives you secure access to your electronic health record. If you see a primary care provider, you can also send messages to your care team and make appointments. If you have questions, please call your primary care clinic.  If you do not have a primary care provider, please call [phone] and they will assist you.        Care EveryWhere ID     This is your Care EveryWhere ID. This could be used by other organizations to access your [facility] medical records  [id number]        Your Vitals Were     Pulse Temperature Pulse Oximetry BMI (Body Mass Index)          67 97.1  F (36.2  C) (Tympanic) 99% 41.11 kg/m2         Blood Pressure from Last 3 Encounters:   11/13/18 128/72   11/05/18 (!) 127/92   10/10/18 126/76    Weight from Last 3 Encounters:   11/13/18 214 lb (97.1 kg)   10/10/18 209 lb (94.8 kg)   10/02/18 220 lb (99.8 kg)              We Performed the Following     Basic metabolic panel     CBC with platelets and differential     EKG 12-lead complete w/read - (Clinic Performed)        Primary Care Provider Office Phone # Fax #    Favian Recinos [phone]       [address] [place] [address]        Equal Access to Services     LUNA WATKINS AH: vanita Oliveira  willie contreraskrissy tirsomadai worthington. So [facility] [phone].    ATENCIÓN: Si neptali correa, tiene a sanchez disposición servicios gratuitos de asistencia lingüística. Ariname al [phone].    We comply with applicable federal civil rights laws and [place] laws. We do not discriminate on the basis of race, color, national origin, age, disability, sex, sexual orientation, or gender identity.            Thank you!     Thank you for choosing [facility]  for your care. Our goal is always to provide you with excellent care. Hearing back from our patients is one way we can continue to improve our services. Please take a few minutes to complete the written survey that you may receive in the mail after your visit with us. Thank you!             Your Updated Medication List - Protect others around you: Learn how to safely use, store and throw away your medicines at www.disposemymeds.org.          This list is accurate as of 11/13/18 11:03 AM.  Always use your most recent med list.                   Brand Name Dispense Instructions for use Diagnosis    ADVIL PO      Take 600-800 mg by mouth every 8 hours as needed for moderate pain        atorvastatin 40 MG tablet    LIPITOR    90 tablet    TAKE 1 TABLET(40 MG) BY MOUTH DAILY    Controlled type 2 diabetes mellitus without complication, without long-term current use of insulin (H)       BENADRYL PO      Take 25 mg by mouth nightly as needed for sleep        blood glucose monitoring lancets     102 each    Use to test blood sugar 2 times daily.    Type 2 diabetes mellitus with hyperglycemia, without long-term current use of insulin (H)       blood glucose monitoring meter device kit     1 kit    Use to test blood sugar 2 times daily .    Type 2 diabetes mellitus with hyperglycemia, without long-term current use of insulin (H)       blood glucose monitoring test strip    ACCU-CHEK YOLANDA PLUS    100 each    Use to test  blood sugar 2 times daily.    Type 2 diabetes mellitus with hyperglycemia, without long-term current use of insulin (H)       COMBIVENT RESPIMAT  MCG/ACT inhaler   Generic drug:  Ipratropium-Albuterol     4 g    INHALE 1 PUFF INTO THE LUNGS FOUR TIMES DAILY AS NEEDED FOR SHORTNESS OF BREATH    Other emphysema (H)       FLUoxetine 20 MG capsule    PROzac    90 capsule    Take 1 capsule (20 mg) by mouth daily    Other depression       lisinopril 10 MG tablet    PRINIVIL/ZESTRIL    30 tablet    TAKE 1 TABLET(10 MG) BY MOUTH DAILY    Benign essential hypertension       loratadine-pseudoePHEDrine  MG per 24 hr tablet    CLARITIN-D 24-hour     Take 1 tablet by mouth daily        * metFORMIN 500 MG tablet    GLUCOPHAGE    90 tablet    TAKE 1 TABLET BY MOUTH THREE TIMES DAILY WITH MEALS.    Type 2 diabetes mellitus without complication, without long-term current use of insulin (H)       * metFORMIN 500 MG tablet    GLUCOPHAGE    90 tablet    TAKE 1 TABLET BY MOUTH THREE TIMES DAILY WITH MEALS.    Type 2 diabetes mellitus without complication, without long-term current use of insulin (H)       order for DME     2 each    Equipment being ordered: chang socks    Venous stasis dermatitis of both lower extremities       pantoprazole 40 MG EC tablet    PROTONIX    60 tablet    TAKE 1 TABLET(40 MG) BY MOUTH TWICE DAILY BEFORE MEALS    Abdominal pain, epigastric       PRIMATENE ASTHMA 12.5-200 MG Tabs   Generic drug:  ePHEDrine-GuaiFENesin      Take 1 tablet by mouth nightly as needed        PROAIR  (90 Base) MCG/ACT inhaler   Generic drug:  albuterol     8.5 g    INHALE 2 PUFFS BY MOUTH EVERY 4 HOURS AS NEEDED FOR SHORTNESS OF BREATH    Shortness of breath       triamcinolone 0.5 % cream    KENALOG    30 g    Apply sparingly to affected area three times daily.    Venous stasis dermatitis of both lower extremities       * Notice:  This list has 2 medication(s) that are the same as other medications prescribed for  you. Read the directions carefully, and ask your doctor or other care provider to review them with you.

## 2018-11-13 NOTE — LETTER
My COPD Action Plan     Name: Sola Gan    YOB: 1950   Date: 11/8/2018    My doctor: Favian Recinos MD   My clinic: [address] [address]  [phone]  My Controller Medicine: Albuterol/Ipratropium (Duoneb, Combivent)   Dose: 1 puff four times daily as needed     My Rescue Medicine: Albuterol (Proair/Ventolin/Proventil) inhaler   Dose: 2 puffs every 4-6 hours as needed     My Flare Up Medicine: na   Dose:      My COPD Severity: Mild = FeV1 > 80%      Use of Oxygen: Oxygen Not Prescribed      Make sure you've had your pneumonia   vaccines.          GREEN ZONE       Doing well today      Usual level of activity and exercise    Usual amount of cough and mucus    No shortness of breath    Usual level of health (thinking clearly, sleeping well, feel like eating) Actions:      Take daily medicines    Use oxygen as prescribed    Follow regular exercise and diet plan    Avoid cigarette smoke and other irritants that harm the lungs           YELLOW ZONE          Having a bad day or flare up      Short of breath more than usual    A lot more sputum (mucus) than usual    Sputum looks yellow, green, tan, brown or bloody    More coughing or wheezing    Fever or chills    Less energy; trouble completing activities    Trouble thinking or focusing    Using quick relief inhaler or nebulizer more often    Poor sleep; symptoms wake me up    Do not feel like eating Actions:      Get plenty of rest    Take daily medicines    Use quick relief inhaler every 4-6 hours    If you use oxygen, call you doctor to see if you should adjust your oxygen    Do breathing exercises or other things to help you relax    Let a loved one, friend or neighbor know you are feeling worse    Call your care team if you have 2 or more symptoms.  Start taking steroids or antibiotics if directed by your care team           RED ZONE       Need medical care now      Severe shortness of  breath (feel you can't breathe)    Fever, chills    Not enough breath to do any activity    Trouble coughing up mucus, walking or talking    Blood in mucus    Frequent coughing   Rescue medicines are not working    Not able to sleep because of breathing    Feel confused or drowsy    Chest pain    Actions:      Call your health care team.  If you cannot reach your care team, call 911 or go to the emergency room.        Annual Reminders:  Meet with Care Team, Flu Shot every Fall  Pharmacy: [facility] [address] [place] [address] [address] [facility]  [address]

## 2018-11-14 ASSESSMENT — ANXIETY QUESTIONNAIRES: GAD7 TOTAL SCORE: 2

## 2018-11-15 NOTE — H&P (VIEW-ONLY)
[address]  [facility] [address]  [phone]  Dept: [phone]    PRE-OP EVALUATION:  Today's date: 2018    Sola Gan (: 1950) presents for pre-operative evaluation assessment as requested by Dr. Coreas.  She requires evaluation and anesthesia risk assessment prior to undergoing surgery/procedure for treatment of port removal and a hernia repair.    Proposed Surgery/ Procedure: Removal of port as well as a hernia removal  Date of Surgery/ Procedure: 18  Time of Surgery/ Procedure: [facility]  Hospital/Surgical Facility: [facility]  Fax number for surgical facility: unknown  Primary Physician: Favian Recinos  Type of Anesthesia Anticipated: to be determined    Patient has a Health Care Directive or Living Will:  YES current on file    1. YES - Do you have a history of heart attack, stroke, stent, bypass or surgery on an artery in the head, neck, heart or legs? Has a port from chemo that runs thorough an artery in the neck  2. NO - Do you ever have any pain or discomfort in your chest?  3. NO - Do you have a history of  Heart Failure?  4. NO - Are you troubled by shortness of breath when: walking on the level, up a slight hill or at night?  5. NO - Do you currently have a cold, bronchitis or other respiratory infection?  6. NO - Do you have a cough, shortness of breath or wheezing?  7. NO - Do you sometimes get pains in the calves of your legs when you walk?  8. NO - Do you or anyone in your family have previous history of blood clots?  9. NO - Do you or does anyone in your family have a serious bleeding problem such as prolonged bleeding following surgeries or cuts?  10. NO - Have you ever had problems with anemia or been told to take iron pills?  11. NO - Have you had any abnormal blood loss such as black, tarry or bloody stools, or abnormal vaginal bleeding?  12. NO - Have you ever had a blood transfusion?  13. NO - Have you or any of your relatives  ever had problems with anesthesia?  14. NO - Do you have sleep apnea, excessive snoring or daytime drowsiness?  15. NO - Do you have any prosthetic heart valves?  16. NO - Do you have prosthetic joints?  17. NO - Is there any chance that you may be pregnant?      HPI:     HPI related to upcoming procedure: ventral hernia and port removal.        See problem list for active medical problems.  Problems all longstanding and stable, except as noted/documented.  See ROS for pertinent symptoms related to these conditions.                                                                                                                                                          .    MEDICAL HISTORY:     Patient Active Problem List    Diagnosis Date Noted     Chest pain 10/25/2017     Priority: Medium     Hypokalemia 10/25/2017     Priority: Medium     Adenocarcinoma of sigmoid colon (H) 09/05/2017     Priority: Medium     Colon tumor 07/17/2017     Priority: Medium     Controlled type 2 diabetes mellitus without complication, without long-term current use of insulin (H) 05/18/2017     Priority: Medium     Seasonal allergic rhinitis due to pollen 04/13/2017     Priority: Medium     ACP (advance care planning) 03/22/2017     Priority: Medium     Advance Care Planning 3/22/2017: ACP Review of Chart / Resources Provided:  Reviewed chart for advance care plan.  Sola Gan has no plan or code status on file. Discussed available resources and provided with information. Confirmed code status reflects current choices pending further ACP discussions.  Confirmed/documented legally designated decision makers.  Added by Tia Virgen           Depression 04/17/2014     Priority: Medium     COPD (chronic obstructive pulmonary disease) probable 03/30/2014     Priority: Medium     Acute bronchitis 03/30/2014     Priority: Medium     Morbid obesity (H) 03/30/2014     Priority: Medium     Essential hypertension 03/30/2014     Priority:  Medium     Stasis dermatitis of both legs 03/30/2014     Priority: Medium     MORA (obstructive sleep apnea) 03/30/2014     Priority: Medium     Acute bronchitis with asthma 03/30/2014     Priority: Medium      Past Medical History:   Diagnosis Date     COPD (chronic obstructive pulmonary disease) (H)      Diabetes (H)      H/O sleep apnea     tonsils, adnoids, and uvula removed     Uncomplicated asthma      Past Surgical History:   Procedure Laterality Date     APPENDECTOMY       CHOLECYSTECTOMY       COLONOSCOPY N/A 6/21/2017    Procedure: COLONOSCOPY;  COLONOSCOPY WITH POLYPECTOMY, BIOPSY, ENDOSCOPIC MARKER TATOOING;  Surgeon: Rigo Coreas DO;  Location: [facility]     COLONOSCOPY N/A 8/16/2018    Procedure: COLONOSCOPY;  COLONOSCOPY with biopsy;  Surgeon: Rigo Coreas DO;  Location: [facility]     ENT SURGERY      for MORA     GYN SURGERY       HERNIA REPAIR       INSERT PORT VASCULAR ACCESS N/A 9/8/2017    Procedure: INSERT PORT VASCULAR ACCESS;  PORT-A-CATH PLACEMENT LEFT INTERNAL JUGULAR;  Surgeon: Awais Ding MD;  Location: [facility]     Current Outpatient Prescriptions   Medication Sig Dispense Refill     atorvastatin (LIPITOR) 40 MG tablet TAKE 1 TABLET(40 MG) BY MOUTH DAILY 90 tablet 0     blood glucose monitoring (ACCU-CHEK YOLANDA PLUS) meter device kit Use to test blood sugar 2 times daily  . 1 kit 0     blood glucose monitoring (ACCU-CHEK YOLANDA PLUS) test strip Use to test blood sugar 2 times daily. 100 each 10     blood glucose monitoring (ACCU-CHEK FASTCLIX) lancets Use to test blood sugar 2 times daily. 102 each 10     COMBIVENT RESPIMAT  MCG/ACT inhaler INHALE 1 PUFF INTO THE LUNGS FOUR TIMES DAILY AS NEEDED FOR SHORTNESS OF BREATH 4 g 0     DiphenhydrAMINE HCl (BENADRYL PO) Take 25 mg by mouth nightly as needed for sleep       ePHEDrine-GuaiFENesin (PRIMATENE ASTHMA) 12.5-200 MG TABS Take 1 tablet by mouth nightly as needed        FLUoxetine (PROZAC) 20 MG capsule Take 1 capsule (20  mg) by mouth daily 90 capsule 0     Ibuprofen (ADVIL PO) Take 600-800 mg by mouth every 8 hours as needed for moderate pain        lisinopril (PRINIVIL/ZESTRIL) 10 MG tablet TAKE 1 TABLET(10 MG) BY MOUTH DAILY 30 tablet 5     loratadine-pseudoePHEDrine (CLARITIN-D 24-HOUR)  MG per 24 hr tablet Take 1 tablet by mouth daily       metFORMIN (GLUCOPHAGE) 500 MG tablet TAKE 1 TABLET BY MOUTH THREE TIMES DAILY WITH MEALS. 90 tablet 5     metFORMIN (GLUCOPHAGE) 500 MG tablet TAKE 1 TABLET BY MOUTH THREE TIMES DAILY WITH MEALS. 90 tablet 0     order for DME Equipment being ordered: chang socks 2 each 1     pantoprazole (PROTONIX) 40 MG EC tablet TAKE 1 TABLET(40 MG) BY MOUTH TWICE DAILY BEFORE MEALS 60 tablet 9     PROAIR  (90 Base) MCG/ACT inhaler INHALE 2 PUFFS BY MOUTH EVERY 4 HOURS AS NEEDED FOR SHORTNESS OF BREATH 8.5 g 1     triamcinolone (KENALOG) 0.5 % cream Apply sparingly to affected area three times daily. 30 g 0     OTC products: None, except as noted above    Allergies   Allergen Reactions     Acyclovir Nausea     Sulfa Drugs Hives      Latex Allergy: NO    Social History   Substance Use Topics     Smoking status: Current Every Day Smoker     Packs/day: 0.75     Years: 20.00     Smokeless tobacco: Never Used     Alcohol use No     History   Drug Use No       REVIEW OF SYSTEMS:   CONSTITUTIONAL: NEGATIVE for fever, chills, change in weight  INTEGUMENTARY/SKIN: NEGATIVE for worrisome rashes, moles or lesions  EYES: NEGATIVE for vision changes or irritation  ENT/MOUTH: NEGATIVE for ear, mouth and throat problems  RESP: NEGATIVE for significant cough or SOB  BREAST: NEGATIVE for masses, tenderness or discharge  CV: NEGATIVE for chest pain, palpitations or peripheral edema  GI: NEGATIVE for nausea, abdominal pain, heartburn, or change in bowel habits  : NEGATIVE for frequency, dysuria, or hematuria  MUSCULOSKELETAL: NEGATIVE for significant arthralgias or myalgia  NEURO: NEGATIVE for weakness, dizziness  or paresthesias  ENDOCRINE: NEGATIVE for temperature intolerance, skin/hair changes  HEME: NEGATIVE for bleeding problems  PSYCHIATRIC: NEGATIVE for changes in mood or affect  Functional capacity:  Reasonable capacity with never any concern of chest pain or cardiac concerns.     EXAM:   There were no vitals taken for this visit.    GENERAL APPEARANCE: healthy, alert and no distress     EYES: EOMI, PERRL     HENT: ear canals and TM's normal and nose and mouth without ulcers or lesions     NECK: no adenopathy, no asymmetry, masses, or scars and thyroid normal to palpation     RESP: lungs clear to auscultation - no rales, rhonchi or wheezes     CV: regular rates and rhythm, normal S1 S2, no S3 or S4 and no murmur, click or rub     ABDOMEN:  soft, nontender, no mass.  I didn't reevaluate the hernia closely.     MS: extremities normal- no gross deformities noted, no evidence of inflammation in joints, FROM in all extremities.     SKIN: no suspicious lesions or rashes     NEURO: Normal strength and tone, sensory exam grossly normal, mentation intact and speech normal     PSYCH: mentation appears normal. and affect normal/bright     LYMPHATICS: No cervical adenopathy    DIAGNOSTICS:   EKG: appears normal, NSR, normal axis, normal intervals, no acute ST/T changes c/w ischemia, no LVH by voltage criteria, cxr clear.  Cbc normal.  Bmp including glucose pending and will be forwarded.  Last a1c 1 month ago and 6.1.      Recent Labs   Lab Test  10/10/18   1102  07/31/18   1516  05/03/18   1330   10/26/17   0512   06/27/17   1237   HGB   --   15.3   --    --   13.2   < >  16.2*   PLT   --   229   --    --   153   < >  283   INR   --    --    --    --    --    --   1.03   NA  134  139   --    --    --    < >  136   POTASSIUM  3.6  4.0   --    < >   --    < >  3.7   CR  0.80  0.73   --    --    --    < >  0.69   A1C  6.1*   --   5.8   < >   --    --    --     < > = values in this interval not displayed.        IMPRESSION:   Reason  for surgery/procedure: ventral hernia and port removal.   Diagnosis/reason for consult: preop clearance for same.     The proposed surgical procedure is considered LOW risk.    REVISED CARDIAC RISK INDEX  The patient has the following serious cardiovascular risks for perioperative complications such as (MI, PE, VFib and 3  AV Block):  No serious cardiac risks  INTERPRETATION: 0 risks: Class I (very low risk - 0.4% complication rate)    The patient has the following additional risks for perioperative complications:  No identified additional risks      ICD-10-CM    1. Preop general physical exam Z01.818        RECOMMENDATIONS:     --Consult hospital rounder / IM to assist post-op medical management    --Patient is to take all scheduled medications on the day of surgery EXCEPT for modifications listed below.    APPROVAL GIVEN to proceed with proposed procedure, without further diagnostic evaluation       Signed Electronically by: Favian Recinos MD    Copy of this evaluation report is provided to requesting physician.    [facility] Preop Guidelines    Revised Cardiac Risk Index

## 2018-11-27 ENCOUNTER — ANESTHESIA EVENT (OUTPATIENT)
Dept: SURGERY | Facility: HOSPITAL | Age: 68
End: 2018-11-27
Payer: MEDICARE

## 2018-11-27 ASSESSMENT — COPD QUESTIONNAIRES: COPD: 1

## 2018-11-27 ASSESSMENT — LIFESTYLE VARIABLES: TOBACCO_USE: 1

## 2018-11-27 NOTE — ANESTHESIA PREPROCEDURE EVALUATION
Anesthesia Evaluation     . Pt has had prior anesthetic. Type: General and MAC    History of anesthetic complications    hx larygospasm on emergence      ROS/MED HX    ENT/Pulmonary:     (+)sleep apnea, MORA risk factors (BMI: 39.76) hypertension, obese, allergic rhinitis, tobacco use, Current use <1.0 PPD packs/day  asthma COPD, , . .    Neurologic:  - neg neurologic ROS     Cardiovascular:     (+) Dyslipidemia, hypertension----. : . . . :. . Previous cardiac testing date:results:date: results:ECG reviewed date:11/13/18 results:nsr date: results:          METS/Exercise Tolerance:     Hematologic:  - neg hematologic  ROS       Musculoskeletal:   (+) arthritis, , , -       GI/Hepatic:     (+) Other GI/Hepatic Adenocarcinoma of sigmoid colon       Renal/Genitourinary:         Endo:     (+) type II DM Obesity, .      Psychiatric:     (+) psychiatric history depression      Infectious Disease:  - neg infectious disease ROS       Malignancy:      - no malignancy   Other:    - neg other ROS                 Physical Exam  Normal systems: cardiovascular, pulmonary and dental    Airway   Mallampati: III  TM distance: >3 FB  Neck ROM: full    Dental     Cardiovascular   Rhythm and rate: regular and normal      Pulmonary    breath sounds clear to auscultation                    Anesthesia Plan      History & Physical Review  History and physical reviewed and following examination; no interval change.    ASA Status:  3 .    NPO Status:  > 8 hours    Plan for General and ETT with Intravenous and Propofol induction. Maintenance will be Balanced.    PONV prophylaxis:  Ondansetron (or other 5HT-3), Scopolamine patch and Dexamethasone or Solumedrol  Hp 11/13/18      Postoperative Care  Postoperative pain management:  IV analgesics and Oral pain medications.      Consents  Anesthetic plan, risks, benefits and alternatives discussed with:  Patient..                          .

## 2018-11-28 ENCOUNTER — SURGERY (OUTPATIENT)
Age: 68
End: 2018-11-28

## 2018-11-28 ENCOUNTER — ANESTHESIA (OUTPATIENT)
Dept: SURGERY | Facility: HOSPITAL | Age: 68
End: 2018-11-28
Payer: MEDICARE

## 2018-11-28 ENCOUNTER — HOSPITAL ENCOUNTER (OUTPATIENT)
Facility: HOSPITAL | Age: 68
Discharge: HOME | End: 2018-11-29
Attending: SURGERY | Admitting: SURGERY
Payer: MEDICARE

## 2018-11-28 DIAGNOSIS — Z87.19 S/P HERNIA SURGERY: Primary | ICD-10-CM

## 2018-11-28 DIAGNOSIS — Z98.890 S/P HERNIA SURGERY: Primary | ICD-10-CM

## 2018-11-28 PROBLEM — G89.18 ACUTE POST-OPERATIVE PAIN: Status: ACTIVE | Noted: 2018-11-28

## 2018-11-28 PROCEDURE — 36000058 ZZH SURGERY LEVEL 3 EA 15 ADDTL MIN: Performed by: SURGERY

## 2018-11-28 PROCEDURE — 40000275 ZZH STATISTIC RCP TIME EA 10 MIN

## 2018-11-28 PROCEDURE — 88302 TISSUE EXAM BY PATHOLOGIST: CPT | Mod: TC | Performed by: SURGERY

## 2018-11-28 PROCEDURE — 25000128 H RX IP 250 OP 636: Performed by: SURGERY

## 2018-11-28 PROCEDURE — 27210794 ZZH OR GENERAL SUPPLY STERILE: Performed by: SURGERY

## 2018-11-28 PROCEDURE — 25000125 ZZHC RX 250: Performed by: NURSE ANESTHETIST, CERTIFIED REGISTERED

## 2018-11-28 PROCEDURE — 25000566 ZZH SEVOFLURANE, EA 15 MIN: Performed by: ANESTHESIOLOGY

## 2018-11-28 PROCEDURE — C1781 MESH (IMPLANTABLE): HCPCS | Performed by: SURGERY

## 2018-11-28 PROCEDURE — 25000128 H RX IP 250 OP 636: Performed by: NURSE ANESTHETIST, CERTIFIED REGISTERED

## 2018-11-28 PROCEDURE — A9270 NON-COVERED ITEM OR SERVICE: HCPCS | Mod: GY | Performed by: NURSE ANESTHETIST, CERTIFIED REGISTERED

## 2018-11-28 PROCEDURE — 25000128 H RX IP 250 OP 636: Performed by: ANESTHESIOLOGY

## 2018-11-28 PROCEDURE — 37000008 ZZH ANESTHESIA TECHNICAL FEE, 1ST 30 MIN: Performed by: SURGERY

## 2018-11-28 PROCEDURE — 49654 ZZHC LAP INCISIONAL HERNIA REPAIR: CPT | Performed by: SURGERY

## 2018-11-28 PROCEDURE — 71000027 ZZH RECOVERY PHASE 2 EACH 15 MINS: Performed by: SURGERY

## 2018-11-28 PROCEDURE — 49652 AS LAP VENT/ABD HERNIA REPAIR: CPT | Performed by: NURSE ANESTHETIST, CERTIFIED REGISTERED

## 2018-11-28 PROCEDURE — 25000128 H RX IP 250 OP 636

## 2018-11-28 PROCEDURE — 71000014 ZZH RECOVERY PHASE 1 LEVEL 2 FIRST HR: Performed by: SURGERY

## 2018-11-28 PROCEDURE — 37000009 ZZH ANESTHESIA TECHNICAL FEE, EACH ADDTL 15 MIN: Performed by: SURGERY

## 2018-11-28 PROCEDURE — 25000132 ZZH RX MED GY IP 250 OP 250 PS 637: Mod: GY | Performed by: NURSE ANESTHETIST, CERTIFIED REGISTERED

## 2018-11-28 PROCEDURE — 49652 AS LAP VENT/ABD HERNIA REPAIR: CPT | Performed by: ANESTHESIOLOGY

## 2018-11-28 PROCEDURE — 25000132 ZZH RX MED GY IP 250 OP 250 PS 637: Mod: GY | Performed by: SURGERY

## 2018-11-28 PROCEDURE — 40000786 ZZHCL STATISTIC ACTIVE MRSA SURVEILLANCE CULTURE: Performed by: SURGERY

## 2018-11-28 PROCEDURE — 36000056 ZZH SURGERY LEVEL 3 1ST 30 MIN: Performed by: SURGERY

## 2018-11-28 PROCEDURE — 94640 AIRWAY INHALATION TREATMENT: CPT

## 2018-11-28 PROCEDURE — 27110028 ZZH OR GENERAL SUPPLY NON-STERILE: Performed by: SURGERY

## 2018-11-28 PROCEDURE — 36590 REMOVAL TUNNELED CV CATH: CPT | Performed by: SURGERY

## 2018-11-28 PROCEDURE — 25000125 ZZHC RX 250: Performed by: ANESTHESIOLOGY

## 2018-11-28 PROCEDURE — A9270 NON-COVERED ITEM OR SERVICE: HCPCS | Mod: GY | Performed by: SURGERY

## 2018-11-28 PROCEDURE — 71000015 ZZH RECOVERY PHASE 1 LEVEL 2 EA ADDTL HR: Performed by: SURGERY

## 2018-11-28 PROCEDURE — 40000306 ZZH STATISTIC PRE PROC ASSESS II: Performed by: SURGERY

## 2018-11-28 DEVICE — IMPLANTABLE DEVICE: Type: IMPLANTABLE DEVICE | Site: ABDOMEN | Status: FUNCTIONAL

## 2018-11-28 RX ORDER — MEPERIDINE HYDROCHLORIDE 50 MG/ML
12.5 INJECTION INTRAMUSCULAR; INTRAVENOUS; SUBCUTANEOUS
Status: DISCONTINUED | OUTPATIENT
Start: 2018-11-28 | End: 2018-11-28 | Stop reason: HOSPADM

## 2018-11-28 RX ORDER — HYDROMORPHONE HYDROCHLORIDE 1 MG/ML
.3-.5 INJECTION, SOLUTION INTRAMUSCULAR; INTRAVENOUS; SUBCUTANEOUS
Status: DISCONTINUED | OUTPATIENT
Start: 2018-11-28 | End: 2018-11-29 | Stop reason: HOSPADM

## 2018-11-28 RX ORDER — HYDROMORPHONE HYDROCHLORIDE 2 MG/ML
INJECTION, SOLUTION INTRAMUSCULAR; INTRAVENOUS; SUBCUTANEOUS
Status: COMPLETED
Start: 2018-11-28 | End: 2018-11-28

## 2018-11-28 RX ORDER — LIDOCAINE 40 MG/G
CREAM TOPICAL
Status: DISCONTINUED | OUTPATIENT
Start: 2018-11-28 | End: 2018-11-29 | Stop reason: HOSPADM

## 2018-11-28 RX ORDER — OXYCODONE HYDROCHLORIDE 5 MG/1
5-10 TABLET ORAL EVERY 6 HOURS PRN
Qty: 20 TABLET | Refills: 0 | Status: SHIPPED | OUTPATIENT
Start: 2018-11-28

## 2018-11-28 RX ORDER — AMOXICILLIN 250 MG
1-2 CAPSULE ORAL 2 TIMES DAILY
Qty: 30 TABLET | Refills: 0 | Status: SHIPPED | OUTPATIENT
Start: 2018-11-28

## 2018-11-28 RX ORDER — NALOXONE HYDROCHLORIDE 0.4 MG/ML
.1-.4 INJECTION, SOLUTION INTRAMUSCULAR; INTRAVENOUS; SUBCUTANEOUS
Status: DISCONTINUED | OUTPATIENT
Start: 2018-11-28 | End: 2018-11-28

## 2018-11-28 RX ORDER — ONDANSETRON 4 MG/1
4 TABLET, ORALLY DISINTEGRATING ORAL EVERY 30 MIN PRN
Status: DISCONTINUED | OUTPATIENT
Start: 2018-11-28 | End: 2018-11-28 | Stop reason: HOSPADM

## 2018-11-28 RX ORDER — KETOROLAC TROMETHAMINE 30 MG/ML
INJECTION, SOLUTION INTRAMUSCULAR; INTRAVENOUS PRN
Status: DISCONTINUED | OUTPATIENT
Start: 2018-11-28 | End: 2018-11-28

## 2018-11-28 RX ORDER — HEPARIN SODIUM 5000 [USP'U]/ML
3000 INJECTION, SOLUTION INTRAVENOUS; SUBCUTANEOUS
Status: COMPLETED | OUTPATIENT
Start: 2018-11-28 | End: 2018-11-28

## 2018-11-28 RX ORDER — CEFAZOLIN SODIUM 2 G/100ML
2 INJECTION, SOLUTION INTRAVENOUS
Status: COMPLETED | OUTPATIENT
Start: 2018-11-28 | End: 2018-11-28

## 2018-11-28 RX ORDER — SODIUM CHLORIDE, SODIUM LACTATE, POTASSIUM CHLORIDE, CALCIUM CHLORIDE 600; 310; 30; 20 MG/100ML; MG/100ML; MG/100ML; MG/100ML
INJECTION, SOLUTION INTRAVENOUS CONTINUOUS
Status: DISCONTINUED | OUTPATIENT
Start: 2018-11-28 | End: 2018-11-28 | Stop reason: HOSPADM

## 2018-11-28 RX ORDER — CEFAZOLIN SODIUM 1 G/3ML
1 INJECTION, POWDER, FOR SOLUTION INTRAMUSCULAR; INTRAVENOUS SEE ADMIN INSTRUCTIONS
Status: DISCONTINUED | OUTPATIENT
Start: 2018-11-28 | End: 2018-11-28 | Stop reason: HOSPADM

## 2018-11-28 RX ORDER — FENTANYL CITRATE 50 UG/ML
INJECTION, SOLUTION INTRAMUSCULAR; INTRAVENOUS PRN
Status: DISCONTINUED | OUTPATIENT
Start: 2018-11-28 | End: 2018-11-28

## 2018-11-28 RX ORDER — PROCHLORPERAZINE MALEATE 5 MG
5 TABLET ORAL EVERY 6 HOURS PRN
Status: DISCONTINUED | OUTPATIENT
Start: 2018-11-28 | End: 2018-11-29 | Stop reason: HOSPADM

## 2018-11-28 RX ORDER — GLYCOPYRROLATE 0.2 MG/ML
INJECTION, SOLUTION INTRAMUSCULAR; INTRAVENOUS PRN
Status: DISCONTINUED | OUTPATIENT
Start: 2018-11-28 | End: 2018-11-28

## 2018-11-28 RX ORDER — METOCLOPRAMIDE 5 MG/1
5 TABLET ORAL EVERY 6 HOURS PRN
Status: DISCONTINUED | OUTPATIENT
Start: 2018-11-28 | End: 2018-11-29 | Stop reason: HOSPADM

## 2018-11-28 RX ORDER — FENTANYL CITRATE 50 UG/ML
25-50 INJECTION, SOLUTION INTRAMUSCULAR; INTRAVENOUS
Status: DISCONTINUED | OUTPATIENT
Start: 2018-11-28 | End: 2018-11-28 | Stop reason: HOSPADM

## 2018-11-28 RX ORDER — DEXAMETHASONE SODIUM PHOSPHATE 10 MG/ML
INJECTION, SOLUTION INTRAMUSCULAR; INTRAVENOUS PRN
Status: DISCONTINUED | OUTPATIENT
Start: 2018-11-28 | End: 2018-11-28 | Stop reason: HOSPADM

## 2018-11-28 RX ORDER — OXYCODONE HYDROCHLORIDE 5 MG/1
5-10 TABLET ORAL EVERY 4 HOURS PRN
Status: DISCONTINUED | OUTPATIENT
Start: 2018-11-28 | End: 2018-11-29 | Stop reason: HOSPADM

## 2018-11-28 RX ORDER — BUPIVACAINE HYDROCHLORIDE 2.5 MG/ML
INJECTION, SOLUTION INFILTRATION; PERINEURAL PRN
Status: DISCONTINUED | OUTPATIENT
Start: 2018-11-28 | End: 2018-11-28 | Stop reason: HOSPADM

## 2018-11-28 RX ORDER — PROPOFOL 10 MG/ML
INJECTION, EMULSION INTRAVENOUS PRN
Status: DISCONTINUED | OUTPATIENT
Start: 2018-11-28 | End: 2018-11-28

## 2018-11-28 RX ORDER — NALOXONE HYDROCHLORIDE 0.4 MG/ML
.1-.4 INJECTION, SOLUTION INTRAMUSCULAR; INTRAVENOUS; SUBCUTANEOUS
Status: DISCONTINUED | OUTPATIENT
Start: 2018-11-28 | End: 2018-11-29 | Stop reason: HOSPADM

## 2018-11-28 RX ORDER — ONDANSETRON 2 MG/ML
4 INJECTION INTRAMUSCULAR; INTRAVENOUS EVERY 6 HOURS PRN
Status: DISCONTINUED | OUTPATIENT
Start: 2018-11-28 | End: 2018-11-29 | Stop reason: HOSPADM

## 2018-11-28 RX ORDER — ONDANSETRON 2 MG/ML
4 INJECTION INTRAMUSCULAR; INTRAVENOUS EVERY 30 MIN PRN
Status: DISCONTINUED | OUTPATIENT
Start: 2018-11-28 | End: 2018-11-28 | Stop reason: HOSPADM

## 2018-11-28 RX ORDER — HEPARIN SODIUM 10000 [USP'U]/ML
3000 INJECTION, SOLUTION INTRAVENOUS; SUBCUTANEOUS
Status: DISCONTINUED | OUTPATIENT
Start: 2018-11-28 | End: 2018-11-28

## 2018-11-28 RX ORDER — HYDROXYZINE HYDROCHLORIDE 50 MG/ML
50 INJECTION, SOLUTION INTRAMUSCULAR ONCE
Status: COMPLETED | OUTPATIENT
Start: 2018-11-28 | End: 2018-11-28

## 2018-11-28 RX ORDER — DEXAMETHASONE SODIUM PHOSPHATE 10 MG/ML
INJECTION, SOLUTION INTRAMUSCULAR; INTRAVENOUS PRN
Status: DISCONTINUED | OUTPATIENT
Start: 2018-11-28 | End: 2018-11-28

## 2018-11-28 RX ORDER — LISINOPRIL 10 MG/1
10 TABLET ORAL DAILY
Status: DISCONTINUED | OUTPATIENT
Start: 2018-11-28 | End: 2018-11-29 | Stop reason: HOSPADM

## 2018-11-28 RX ORDER — LIDOCAINE HYDROCHLORIDE 20 MG/ML
INJECTION, SOLUTION INFILTRATION; PERINEURAL PRN
Status: DISCONTINUED | OUTPATIENT
Start: 2018-11-28 | End: 2018-11-28

## 2018-11-28 RX ORDER — CALCIUM CARBONATE 500 MG/1
1000 TABLET, CHEWABLE ORAL 4 TIMES DAILY PRN
Status: DISCONTINUED | OUTPATIENT
Start: 2018-11-28 | End: 2018-11-29 | Stop reason: HOSPADM

## 2018-11-28 RX ORDER — SCOLOPAMINE TRANSDERMAL SYSTEM 1 MG/1
1 PATCH, EXTENDED RELEASE TRANSDERMAL ONCE
Status: COMPLETED | OUTPATIENT
Start: 2018-11-28 | End: 2018-11-28

## 2018-11-28 RX ORDER — NEOSTIGMINE METHYLSULFATE 1 MG/ML
VIAL (ML) INJECTION PRN
Status: DISCONTINUED | OUTPATIENT
Start: 2018-11-28 | End: 2018-11-28

## 2018-11-28 RX ORDER — ONDANSETRON 2 MG/ML
INJECTION INTRAMUSCULAR; INTRAVENOUS PRN
Status: DISCONTINUED | OUTPATIENT
Start: 2018-11-28 | End: 2018-11-28

## 2018-11-28 RX ORDER — ONDANSETRON 4 MG/1
4 TABLET, ORALLY DISINTEGRATING ORAL EVERY 6 HOURS PRN
Status: DISCONTINUED | OUTPATIENT
Start: 2018-11-28 | End: 2018-11-29 | Stop reason: HOSPADM

## 2018-11-28 RX ORDER — METOCLOPRAMIDE HYDROCHLORIDE 5 MG/ML
5 INJECTION INTRAMUSCULAR; INTRAVENOUS EVERY 6 HOURS PRN
Status: DISCONTINUED | OUTPATIENT
Start: 2018-11-28 | End: 2018-11-29 | Stop reason: HOSPADM

## 2018-11-28 RX ORDER — ALBUTEROL SULFATE 90 UG/1
AEROSOL, METERED RESPIRATORY (INHALATION) PRN
Status: DISCONTINUED | OUTPATIENT
Start: 2018-11-28 | End: 2018-11-28

## 2018-11-28 RX ORDER — ALBUTEROL SULFATE 0.83 MG/ML
2.5 SOLUTION RESPIRATORY (INHALATION) EVERY 4 HOURS PRN
Status: DISCONTINUED | OUTPATIENT
Start: 2018-11-28 | End: 2018-11-28 | Stop reason: HOSPADM

## 2018-11-28 RX ADMIN — FENTANYL CITRATE 50 MCG: 50 INJECTION, SOLUTION INTRAMUSCULAR; INTRAVENOUS at 11:15

## 2018-11-28 RX ADMIN — SCOPALAMINE 1 PATCH: 1 PATCH, EXTENDED RELEASE TRANSDERMAL at 07:13

## 2018-11-28 RX ADMIN — PROPOFOL 200 MG: 10 INJECTION, EMULSION INTRAVENOUS at 07:50

## 2018-11-28 RX ADMIN — Medication 0.5 MG: at 13:33

## 2018-11-28 RX ADMIN — SODIUM CHLORIDE, POTASSIUM CHLORIDE, SODIUM LACTATE AND CALCIUM CHLORIDE: 600; 310; 30; 20 INJECTION, SOLUTION INTRAVENOUS at 10:33

## 2018-11-28 RX ADMIN — DEXAMETHASONE SODIUM PHOSPHATE 8 MG: 10 INJECTION, SOLUTION INTRAMUSCULAR; INTRAVENOUS at 08:56

## 2018-11-28 RX ADMIN — PROPOFOL 100 MG: 10 INJECTION, EMULSION INTRAVENOUS at 07:54

## 2018-11-28 RX ADMIN — HYDROXYZINE HYDROCHLORIDE 50 MG: 50 INJECTION, SOLUTION INTRAMUSCULAR at 11:38

## 2018-11-28 RX ADMIN — ROCURONIUM BROMIDE 10 MG: 10 INJECTION INTRAVENOUS at 09:36

## 2018-11-28 RX ADMIN — DEXAMETHASONE SODIUM PHOSPHATE 4 MG: 10 INJECTION, SOLUTION INTRAMUSCULAR; INTRAVENOUS at 07:50

## 2018-11-28 RX ADMIN — MIDAZOLAM 2 MG: 1 INJECTION INTRAMUSCULAR; INTRAVENOUS at 07:44

## 2018-11-28 RX ADMIN — ALBUTEROL SULFATE 6 PUFF: 90 AEROSOL, METERED RESPIRATORY (INHALATION) at 10:43

## 2018-11-28 RX ADMIN — KETOROLAC TROMETHAMINE 15 MG: 30 INJECTION, SOLUTION INTRAMUSCULAR at 10:49

## 2018-11-28 RX ADMIN — FENTANYL CITRATE 50 MCG: 50 INJECTION, SOLUTION INTRAMUSCULAR; INTRAVENOUS at 12:42

## 2018-11-28 RX ADMIN — FENTANYL CITRATE 100 MCG: 50 INJECTION, SOLUTION INTRAMUSCULAR; INTRAVENOUS at 10:49

## 2018-11-28 RX ADMIN — OXYCODONE HYDROCHLORIDE 5 MG: 5 TABLET ORAL at 17:51

## 2018-11-28 RX ADMIN — HEPARIN SODIUM 3000 UNITS: 5000 INJECTION, SOLUTION INTRAVENOUS; SUBCUTANEOUS at 07:36

## 2018-11-28 RX ADMIN — FENTANYL CITRATE 50 MCG: 50 INJECTION, SOLUTION INTRAMUSCULAR; INTRAVENOUS at 11:38

## 2018-11-28 RX ADMIN — SODIUM CHLORIDE, POTASSIUM CHLORIDE, SODIUM LACTATE AND CALCIUM CHLORIDE: 600; 310; 30; 20 INJECTION, SOLUTION INTRAVENOUS at 07:44

## 2018-11-28 RX ADMIN — FENTANYL CITRATE 50 MCG: 50 INJECTION, SOLUTION INTRAMUSCULAR; INTRAVENOUS at 11:21

## 2018-11-28 RX ADMIN — LISINOPRIL 10 MG: 10 TABLET ORAL at 20:39

## 2018-11-28 RX ADMIN — BUPIVACAINE HYDROCHLORIDE 60 ML: 2.5 INJECTION, SOLUTION INFILTRATION; PERINEURAL at 09:56

## 2018-11-28 RX ADMIN — RANITIDINE 150 MG: 150 TABLET ORAL at 15:18

## 2018-11-28 RX ADMIN — GLYCOPYRROLATE 0.45 MG: 0.2 INJECTION, SOLUTION INTRAMUSCULAR; INTRAVENOUS at 10:39

## 2018-11-28 RX ADMIN — ROCURONIUM BROMIDE 50 MG: 10 INJECTION INTRAVENOUS at 07:50

## 2018-11-28 RX ADMIN — HYDROMORPHONE HYDROCHLORIDE 0.5 MG: 2 INJECTION INTRAMUSCULAR; INTRAVENOUS; SUBCUTANEOUS at 12:04

## 2018-11-28 RX ADMIN — HYDROMORPHONE HYDROCHLORIDE 0.5 MG: 10 INJECTION, SOLUTION INTRAMUSCULAR; INTRAVENOUS; SUBCUTANEOUS at 10:03

## 2018-11-28 RX ADMIN — Medication 3 MG: at 10:39

## 2018-11-28 RX ADMIN — LIDOCAINE HYDROCHLORIDE 100 MG: 20 INJECTION, SOLUTION INFILTRATION; PERINEURAL at 10:42

## 2018-11-28 RX ADMIN — FENTANYL CITRATE 50 MCG: 50 INJECTION, SOLUTION INTRAMUSCULAR; INTRAVENOUS at 11:49

## 2018-11-28 RX ADMIN — Medication 0.5 MG: at 12:04

## 2018-11-28 RX ADMIN — CEFAZOLIN SODIUM 2 G: 2 INJECTION, SOLUTION INTRAVENOUS at 07:46

## 2018-11-28 RX ADMIN — FENTANYL CITRATE 100 MCG: 50 INJECTION, SOLUTION INTRAMUSCULAR; INTRAVENOUS at 08:55

## 2018-11-28 RX ADMIN — FENTANYL CITRATE 100 MCG: 50 INJECTION, SOLUTION INTRAMUSCULAR; INTRAVENOUS at 07:50

## 2018-11-28 RX ADMIN — HYDROMORPHONE HYDROCHLORIDE 0.3 MG: 1 INJECTION, SOLUTION INTRAMUSCULAR; INTRAVENOUS; SUBCUTANEOUS at 21:44

## 2018-11-28 RX ADMIN — ONDANSETRON 4 MG: 2 INJECTION INTRAMUSCULAR; INTRAVENOUS at 07:50

## 2018-11-28 RX ADMIN — ALBUTEROL SULFATE 2.5 MG: 2.5 SOLUTION RESPIRATORY (INHALATION) at 11:12

## 2018-11-28 ASSESSMENT — ACTIVITIES OF DAILY LIVING (ADL)
DRESS: 0-->INDEPENDENT
RETIRED_EATING: 0-->INDEPENDENT
AMBULATION: 0-->INDEPENDENT
COGNITION: 0 - NO COGNITION ISSUES REPORTED
RETIRED_COMMUNICATION: 0-->UNDERSTANDS/COMMUNICATES WITHOUT DIFFICULTY
FALL_HISTORY_WITHIN_LAST_SIX_MONTHS: NO
SWALLOWING: 0-->SWALLOWS FOODS/LIQUIDS WITHOUT DIFFICULTY
BATHING: 0-->INDEPENDENT
TOILETING: 0-->INDEPENDENT
TRANSFERRING: 0-->INDEPENDENT

## 2018-11-28 ASSESSMENT — PAIN DESCRIPTION - DESCRIPTORS
DESCRIPTORS: ACHING;DISCOMFORT
DESCRIPTORS: DISCOMFORT
DESCRIPTORS: ACHING;DISCOMFORT

## 2018-11-28 NOTE — IP AVS SNAPSHOT
MRN:[id number]                      After Visit Summary   11/28/2018    Sola Gan    MRN: [id number]           Thank you!     Thank you for choosing [facility] for your care. Our goal is always to provide you with excellent care. Hearing back from our patients is one way we can continue to improve our services. Please take a few minutes to complete the written survey that you may receive in the mail after you visit with us. Thank you!        Patient Information     Date Of Birth          1950        About your hospital stay     You were admitted on:  November 28, 2018 You last received care in the:  [facility]    You were discharged on:  November 29, 2018       Who to Call     For medical emergencies, please call 911.  For non-urgent questions about your medical care, please call your primary care provider or clinic, [phone]  For questions related to your surgery, please call your surgery clinic        Attending Provider     Provider Specialty    Rigo Coreas, DO Surgery       Primary Care Provider Office Phone # Fax #    Favian Recinos [phone]      After Care Instructions     Diet Instructions       Resume pre-procedure diet            Discharge Instructions       Follow up appointment with Dr. Coreas in 2 weeks for wound check.  Please don't hesitate to call my office with any questions or concerns.  Things to watch for are fevers greater than 101.3, pain uncontrolled by pain narcotics, deep red skin around the incision and pus coming out of the incision.            Do not - immerse incision in water until sutures removed       Do not immerse incision in water for two weeks.  No baths, hot tubs, pools, rivers, lakes, oceans, etc.            Dressing       Keep dressing clean and dry.  Dressing / incisional care as instructed by RN and or Surgeon            No driving or operating machinery        While taking pain narcotics.  Must be off  pain narcotics for 24 hours and not be limited by pain before driving a vehicle or operating heavy equipment.            No lifting        No lifting over 10 lbs and no strenuous physical activity for 4 weeks            Shower       No shower for 48 hours post procedure. May shower Postoperative Day (POD)  #2.  At that time, the bandages may come off.  There are steri-stirps over the wounds.  Its okay to shower with these on, do not scrub.  Just let the soapy water run over the incisions and pat dry.  The steri-strips will fall off on their own in approximately 2 weeks.                  Your next 10 appointments already scheduled     Dec 11, 2018  9:30 AM CST   (Arrive by 9:15 AM)   Post Op with Rigo Coreas,    [facility] ([facility] )    [address] [place] [address]   [phone]              Further instructions from your care team           POST OPERATIVE PATIENT INFORMATION  Hernia Repair      Home care following hernia repair:  You will be discharged when you are able to be driven home.  Anesthesia may reduce judgment, reaction time and coordination for several hours after you seemingly have recovered.  Therefore, do not operate any motorized vehicles or power tools for 24 hours after discharge.  You should also not be alone for 24 hours after surgery following General Anesthesia..    Activity:  After arriving home, it is suggested that you rest or do quiet activities for the rest of the day.  The next day you may be as active as you feel able.  You may find too, that you require more rest than usual the first 3-4 days as your body heals.  Check with your doctor when you may resume normal activities.    Diet:  Eat small amounts frequently after arriving home.  Begin with clear liquids such as ginger ale, lemon-lime drinks, Jell-O, popsicles and clear soups in small quantities.  Gradually increase your diet to include other juices, creamed soups and  solids as tolerated.  Avoid foods the day of surgery which are hard to digest such as heavy, sweet, highly spiced, or fried foods.    Emesis (vomiting) sometimes occurs after general anesthesia.  If emesis persists more than 5-7 times after arriving home, or if you have other difficulties, call your doctor.    Medications:  If you have prescriptions from your doctor, take them as prescribed until they are gone and/or need to be refilled.    Other:  1. Difficulty in urination can occur post operatively.  If you have any problems, call our physician or go to the ER.  2. No lifting over 15 lbs for 4 weeks.   3. No active sports (biking, riding, skating, swimming, etc.).  4. Early on, it's common for the area around your incision to be swollen, bruised, and sore.     Dressing:  Leave your dressing on as directed:     Incision Care  Remember: Follow-up visits allow your healthcare provider to make sure your incision is healing well. Be sure to keep your appointments.     Stitches (sutures), surgical staples, special strips of surgical tape, or surgical skin glue may be used to close incisions. They also help stop bleeding and speed healing. To help your incision heal, follow the tips on this handout.  Home care  Tips for home care include the following:    Always wash your hands before touching your incision.    Keep your incision clean and dry.    Avoid doing things that could cause dirt or sweat to get on your incision.    Don t pick at scabs. They help protect the wound.    Keep your incision out of water.    Take a sponge bath to avoid getting your incision wet, unless your healthcare provider tells you otherwise.    Ask your provider when can you take a shower or bathe.    Ask your provider about the best way to keep your incision dry when bathing or showering.    Pat stitches dry if they get wet. Don t rub.    Leave the bandage (dressing) in place until you are told to remove it or change it. Change it only as  directed, using clean hands.    After the first 12 hours, change your dressing every 24 hours, or as directed by your healthcare provider.    Change your dressing if it gets wet or soiled.  Care for specific closures  Follow these guidelines unless your healthcare provider tells you otherwise:    Stitches or staples. Once you no longer need to keep these dry, clean the wound daily. First remove the bandage using clean hands. Then wash the area gently with soap and warm water. Use a wet cotton swab to loosen and remove any blood or crust that forms. After cleaning, put a thin layer of antibiotic ointment on. Then put on a new bandage.    Skin glue. Don t put liquid, ointment, or cream on your wound while the glue is in place. Avoid activities that cause heavy sweating. Protect the wound from sunlight. Do not scratch, rub, or pick at the glue. Do not put tape directly over the glue. The glue should peel off within 5 to 10 days.    Surgical tape. Keep the area dry. If it gets wet, blot the area dry with a clean towel. Surgical tape usually falls off within 7 to 10 days. If it has not fallen off after 10 days, contact your healthcare provider before taking it off yourself. If you are told to remove the tape, put mineral oil or petroleum jelly on a cotton ball. Gently rub the tape until it is removed.  Changing your dressing  Leave the dressing (bandage) in place until you are told to remove it or change it. Follow the instructions below unless told otherwise by your healthcare provider:    Always wash your hands before changing your dressing.    After the first 48 hours, the incision wound usually will have closed. At this point, leave the incision uncovered and open to the air. If the incision has not closed keep it covered.    Cover your incision only if your clothing is rubbing it or causing irritation.    Change your dressing if it gets wet or soiled.    For comfort, men may wear scrotal support after inguinal  hernia repair.    Drainage:   Bleeding or drainage should be minimal.  1. If bleeding should soak the dressings, cover with another dressing. Do not remove the original dressing.  2. If bleeding continues, apply gentle steady pressure over the incision for 5 minutes.  3. If bleeding persists or there is an increased swelling of the area, call your surgeon or return to the Emergency Room.    When to call your healthcare provider:  Call your healthcare provider if you have any of the following:    A large amount of swelling or bruising (Men: may notice testicular swelling and bruising. This is normal)    Fever of 101.5*F (38*C) or higher, or as directed by your healthcare provider.    Pain, redness, bleeding, or fluid from the incision that gets worse    Drainage with an odor    Trouble urinating    Constipation    Vomiting         QUESTIONS?:  Please feel free to call your healthcare provider or the hospital  if you have any questions, and they will be happy to assist you.         If you have any questions about these instructions, feel free to ask the nurse before discharge.  If you have difficulty after surgery and are unable to contact your physician at his/her clinic, call the hospital Emergency Room for assistance at [phone].    Post-Anesthesia Patient Instructions    IMMEDIATELY FOLLOWING SURGERY:  Do not drive or operate machinery for the first twenty four hours after surgery.  Do not make any important decisions for twenty four hours after surgery or while taking narcotic pain medications or sedatives.  If you develop intractable nausea and vomiting or a severe headache please notify your doctor immediately.    FOLLOW-UP:  Please make an appointment with your surgeon as instructed. You do not need to follow up with anesthesia unless specifically instructed to do so.    WOUND CARE INSTRUCTIONS (if applicable):  Keep a dry clean dressing on the anesthesia/puncture wound site if there is drainage.  " Once the wound has quit draining you may leave it open to air.  Generally you should leave the bandage intact for twenty four hours unless there is drainage.  If the epidural site drains for more than 36-48 hours please call the anesthesia department.    QUESTIONS?:  Please feel free to call your physician or the hospital  if you have any questions, and they will be happy to assist you.       Pending Results     Date and Time Order Name Status Description    11/28/2018 1454 Active MRSA Surveillance Culture Preliminary     11/28/2018 0850 Surgical pathology exam In process             Statement of Approval     Ordered          11/29/18 0657  I have reviewed and agree with all the recommendations and orders detailed in this document.  EFFECTIVE NOW     Approved and electronically signed by:  Rigo Coreas,              Admission Information     Date & Time Provider Department Dept. Phone    11/28/2018 Rigo Coreas, DO [facility] [phone]      Your Vitals Were     Blood Pressure Temperature Respirations Height Weight Pulse Oximetry    107/55 99.3  F (37.4  C) (Tympanic) 16 1.549 m (5' 1\") 95.3 kg (210 lb) 95%    BMI (Body Mass Index)                   39.68 kg/m2           MyChart Information     [employer] gives you secure access to your electronic health record. If you see a primary care provider, you can also send messages to your care team and make appointments. If you have questions, please call your primary care clinic.  If you do not have a primary care provider, please call [phone] and they will assist you.        Care EveryWhere ID     This is your Care EveryWhere ID. This could be used by other organizations to access your [facility] medical records  [id number]        Equal Access to Services     LUNA WATKINS : Allie Mir, warolando contreras, qaybta kamadai parry. So [facility] [place] [phone].    ATENCIÓN: Si habla " español, tiene a sanchez disposición servicios gratuitos de asistencia lingüística. Ritesh parada [phone].    We comply with applicable federal civil rights laws and [place] laws. We do not discriminate on the basis of race, color, national origin, age, disability, sex, sexual orientation, or gender identity.               Review of your medicines      START taking        Dose / Directions    oxyCODONE 5 MG tablet   Commonly known as:  ROXICODONE   Used for:  S/P hernia surgery        Dose:  5-10 mg   Take 1-2 tablets (5-10 mg) by mouth every 6 hours as needed for pain (Moderate to Severe)   Quantity:  20 tablet   Refills:  0       prochlorperazine 10 MG tablet   Commonly known as:  COMPAZINE   Used for:  S/P hernia surgery        Dose:  10 mg   Take 1 tablet (10 mg) by mouth every 6 hours as needed for nausea or vomiting   Quantity:  10 tablet   Refills:  1       senna-docusate 8.6-50 MG tablet   Commonly known as:  SENOKOT-S/PERICOLACE   Used for:  S/P hernia surgery        Dose:  1-2 tablet   Take 1-2 tablets by mouth 2 times daily Take while on oral narcotics to prevent or treat constipation.   Quantity:  30 tablet   Refills:  0         CONTINUE these medicines which have NOT CHANGED        Dose / Directions    ADVIL PO        Dose:  600-800 mg   Take 600-800 mg by mouth every 8 hours as needed for moderate pain   Refills:  0       atorvastatin 40 MG tablet   Commonly known as:  LIPITOR   Used for:  Controlled type 2 diabetes mellitus without complication, without long-term current use of insulin (H)        TAKE 1 TABLET(40 MG) BY MOUTH DAILY   Quantity:  90 tablet   Refills:  0       BENADRYL PO        Dose:  25 mg   Take 25 mg by mouth nightly as needed for sleep   Refills:  0       blood glucose monitoring lancets   Used for:  Type 2 diabetes mellitus with hyperglycemia, without long-term current use of insulin (H)        Use to test blood sugar 2 times daily.   Quantity:  102 each   Refills:  10       blood  glucose monitoring meter device kit   Used for:  Type 2 diabetes mellitus with hyperglycemia, without long-term current use of insulin (H)        Use to test blood sugar 2 times daily .   Quantity:  1 kit   Refills:  0       blood glucose monitoring test strip   Commonly known as:  ACCU-CHEK YOLANDA PLUS   Used for:  Type 2 diabetes mellitus with hyperglycemia, without long-term current use of insulin (H)        Use to test blood sugar 2 times daily.   Quantity:  100 each   Refills:  10       COMBIVENT RESPIMAT  MCG/ACT inhaler   Used for:  Other emphysema (H)   Generic drug:  Ipratropium-Albuterol        INHALE 1 PUFF INTO THE LUNGS FOUR TIMES DAILY AS NEEDED FOR SHORTNESS OF BREATH   Quantity:  4 g   Refills:  0       FLUoxetine 20 MG capsule   Commonly known as:  PROzac   Used for:  Other depression        Dose:  20 mg   Take 1 capsule (20 mg) by mouth daily   Quantity:  90 capsule   Refills:  0       lisinopril 10 MG tablet   Commonly known as:  PRINIVIL/ZESTRIL   Used for:  Benign essential hypertension        TAKE 1 TABLET(10 MG) BY MOUTH DAILY   Quantity:  30 tablet   Refills:  5       loratadine-pseudoePHEDrine  MG 24 hr tablet   Commonly known as:  CLARITIN-D 24-HOUR        Dose:  1 tablet   Take 1 tablet by mouth daily   Refills:  0       metFORMIN 500 MG tablet   Commonly known as:  GLUCOPHAGE   Used for:  Type 2 diabetes mellitus without complication, without long-term current use of insulin (H)        TAKE 1 TABLET BY MOUTH THREE TIMES DAILY WITH MEALS.   Quantity:  90 tablet   Refills:  0       order for DME   Used for:  Venous stasis dermatitis of both lower extremities        Equipment being ordered: chang socks   Quantity:  2 each   Refills:  1       pantoprazole 40 MG EC tablet   Commonly known as:  PROTONIX   Used for:  Abdominal pain, epigastric        TAKE 1 TABLET(40 MG) BY MOUTH TWICE DAILY BEFORE MEALS   Quantity:  60 tablet   Refills:  9       PRIMATENE ASTHMA 12.5-200 MG Tabs    Generic drug:  ePHEDrine-GuaiFENesin        Dose:  1 tablet   Take 1 tablet by mouth nightly as needed   Refills:  0       PROAIR  (90 Base) MCG/ACT inhaler   Used for:  Shortness of breath   Generic drug:  albuterol        INHALE 2 PUFFS BY MOUTH EVERY 4 HOURS AS NEEDED FOR SHORTNESS OF BREATH   Quantity:  8.5 g   Refills:  1       triamcinolone 0.5 % external cream   Commonly known as:  KENALOG   Used for:  Venous stasis dermatitis of both lower extremities        Apply sparingly to affected area three times daily.   Quantity:  30 g   Refills:  0            Where to get your medicines      These medications were sent to [id number] - [place], [place] - [address] AT [facility] [address] [facility] [address], [place] [address]     Phone:  [phone]     prochlorperazine 10 MG tablet    senna-docusate 8.6-50 MG tablet         Some of these will need a paper prescription and others can be bought over the counter. Ask your nurse if you have questions.     Bring a paper prescription for each of these medications     oxyCODONE 5 MG tablet                Protect others around you: Learn how to safely use, store and throw away your medicines at www.disposemymeds.org.        Information about OPIOIDS     PRESCRIPTION OPIOIDS: WHAT YOU NEED TO KNOW   We gave you an opioid (narcotic) pain medicine. It is important to manage your pain, but opioids are not always the best choice. You should first try all the other options your care team gave you. Take this medicine for as short a time (and as few doses) as possible.    Some activities can increase your pain, such as bandage changes or therapy sessions. It may help to take your pain medicine 30 to 60 minutes before these activities. Reduce your stress by getting enough sleep, working on hobbies you enjoy and practicing relaxation or meditation. Talk to your care team about ways to manage your pain beyond prescription opioids.    These medicines have  risks:    DO NOT drive when on new or higher doses of pain medicine. These medicines can affect your alertness and reaction times, and you could be arrested for driving under the influence (DUI). If you need to use opioids long-term, talk to your care team about driving.    DO NOT operate heavy machinery    DO NOT do any other dangerous activities while taking these medicines.    DO NOT drink any alcohol while taking these medicines.     If the opioid prescribed includes acetaminophen, DO NOT take with any other medicines that contain acetaminophen. Read all labels carefully. Look for the word  acetaminophen  or  Tylenol.  Ask your pharmacist if you have questions or are unsure.    You can get addicted to pain medicines, especially if you have a history of addiction (chemical, alcohol or substance dependence). Talk to your care team about ways to reduce this risk.    All opioids tend to cause constipation. Drink plenty of water and eat foods that have a lot of fiber, such as fruits, vegetables, prune juice, apple juice and high-fiber cereal. Take a laxative (Miralax, milk of magnesia, Colace, Senna) if you don t move your bowels at least every other day. Other side effects include upset stomach, sleepiness, dizziness, throwing up, tolerance (needing more of the medicine to have the same effect), physical dependence and slowed breathing.    Store your pills in a secure place, locked if possible. We will not replace any lost or stolen medicine. If you don t finish your medicine, please throw away (dispose) as directed by your pharmacist. The [place] [employer]ution [employer] has more information about safe disposal: https://www.pca.[facility].[place].us/living-green/managing-unwanted-medications             Medication List: This is a list of all your medications and when to take them. Check marks below indicate your daily home schedule. Keep this list as a reference.      Medications           Morning Afternoon Evening Bedtime  As Needed    ADVIL PO   Take 600-800 mg by mouth every 8 hours as needed for moderate pain                                atorvastatin 40 MG tablet   Commonly known as:  LIPITOR   TAKE 1 TABLET(40 MG) BY MOUTH DAILY                                BENADRYL PO   Take 25 mg by mouth nightly as needed for sleep                                blood glucose monitoring lancets   Use to test blood sugar 2 times daily.                                blood glucose monitoring meter device kit   Use to test blood sugar 2 times daily .                                blood glucose monitoring test strip   Commonly known as:  ACCU-CHEK YOLANDA PLUS   Use to test blood sugar 2 times daily.                                COMBIVENT RESPIMAT  MCG/ACT inhaler   INHALE 1 PUFF INTO THE LUNGS FOUR TIMES DAILY AS NEEDED FOR SHORTNESS OF BREATH   Generic drug:  Ipratropium-Albuterol                                FLUoxetine 20 MG capsule   Commonly known as:  PROzac   Take 1 capsule (20 mg) by mouth daily                                lisinopril 10 MG tablet   Commonly known as:  PRINIVIL/ZESTRIL   TAKE 1 TABLET(10 MG) BY MOUTH DAILY   Last time this was given:  10 mg on 11/28/2018  8:39 PM                                loratadine-pseudoePHEDrine  MG 24 hr tablet   Commonly known as:  CLARITIN-D 24-HOUR   Take 1 tablet by mouth daily                                metFORMIN 500 MG tablet   Commonly known as:  GLUCOPHAGE   TAKE 1 TABLET BY MOUTH THREE TIMES DAILY WITH MEALS.                                order for DME   Equipment being ordered: chang socks                                oxyCODONE 5 MG tablet   Commonly known as:  ROXICODONE   Take 1-2 tablets (5-10 mg) by mouth every 6 hours as needed for pain (Moderate to Severe)   Last time this was given:  5 mg on 11/29/2018  9:37 AM                                pantoprazole 40 MG EC tablet   Commonly known as:  PROTONIX   TAKE 1 TABLET(40 MG) BY MOUTH TWICE DAILY BEFORE MEALS                                 PRIMATENE ASTHMA 12.5-200 MG Tabs   Take 1 tablet by mouth nightly as needed   Generic drug:  ePHEDrine-GuaiFENesin                                PROAIR  (90 Base) MCG/ACT inhaler   INHALE 2 PUFFS BY MOUTH EVERY 4 HOURS AS NEEDED FOR SHORTNESS OF BREATH   Last time this was given:  6 puffs on 11/28/2018 10:43 AM   Generic drug:  albuterol                                prochlorperazine 10 MG tablet   Commonly known as:  COMPAZINE   Take 1 tablet (10 mg) by mouth every 6 hours as needed for nausea or vomiting   Last time this was given:  5 mg on 11/29/2018  4:42 AM                                senna-docusate 8.6-50 MG tablet   Commonly known as:  SENOKOT-S/PERICOLACE   Take 1-2 tablets by mouth 2 times daily Take while on oral narcotics to prevent or treat constipation.                                triamcinolone 0.5 % external cream   Commonly known as:  KENALOG   Apply sparingly to affected area three times daily.                                          More Information        Having Hernia Surgery: Traditional Repair    Surgery treats a hernia by repairing the weakness in the abdominal wall. A cut (incision) is made so the surgeon has a direct view of the hernia. The repair is then done through this incision. This is called open surgery. To fix the problem, muscle and connective tissue may be sewn together to make a  traditional repair.   Follow your healthcare provider s advice on how to get ready for the procedure. You can usually go home the same day as your surgery. In some cases, though, you may need to stay in the hospital overnight.  Getting ready for surgery  Your healthcare provider will talk with you about preparing for surgery. Follow all the instructions you re given and be sure to:     Tell your healthcare provider about any medicines, supplements, or herbs you take. This includes both prescription and over-the-counter items.    Stop taking aspirin,  ibuprofen, naproxen, and other NSAIDs as directed.    Arrange for an adult family member or friend to give you a ride home after surgery.    Stop smoking. Smoking affects blood flow and can slow healing.    Gently wash the surgical area the night before surgery.    Follow any directions you are given for not eating or drinking before surgery.  The day of surgery  Arrive at the hospital or surgical center at your scheduled time. You ll be asked to change into a patient gown. You ll then be given an IV to provide fluids and medicine. Shortly before surgery, an anesthesiologist will talk with you. He or she will explain the types of anesthesia used to prevent pain during surgery. You will have one or more of the following:    Monitored sedation to make you relaxed and sleepy    Local anesthesia to numb the surgical site    Regional anesthesia to numb specific areas of your body    General anesthesia to let you sleep during surgery  Risks and complications  Hernia surgery is safe, but does have risks including:    Bleeding    Infection    Anesthesia risks    Mesh complications    Inability to urinate     Numbness or pain in the groin or leg    Risk the hernia will recur    Damage to the testicles or testicular function    Bowel or bladder injury      During the surgery  To make a traditional repair, an incision is made over the hernia. The muscle tissue surrounding the weak area is then sewn together to repair the defect. The incision is closed with stitches, staples, surgical tape, or special glue. This method can be used to repair any type of hernia.  After surgery  When the procedure is over, you ll be taken to the recovery area to rest. Your blood pressure and heart rate will be monitored. You ll also have a bandage over the surgical site. To help reduce discomfort, you ll be given pain medicines. You may also be given breathing exercises to keep your lungs clear. Later, you ll be asked to get up and walk. This  helps prevent blood clots in the legs. You can go home when your healthcare provider says you re ready.   Make sure you understand your aftercare instructions, wound care around the incision, physical restrictions, and when to follow up with the surgeon for a postoperative checkup. The most important restriction is no heavy lifting for several weeks following the surgery.  Date Last Reviewed: 7/1/2016 2000-2018 [employer]. [address] [place] [address], [place] [address]. All rights reserved. This information is not intended as a substitute for professional medical care. Always follow your healthcare professional's instructions.                After Laparoscopic Hernia Repair  You had a procedure called laparoscopic hernia repair. A hernia is a defect in the tough tissue covering the musculature of the abdominal wall (fascia). During laparoscopic hernia surgery, a surgeon inserts a telescope attached to a camera as well as surgical instruments through tiny incisions in your abdomen. The surgeon repairs the hernia with a mesh, which patches the tear or weakness in the fascia.  Home care    Note that your shoulder may feel tight or your neck may be stiff for 24 to 48 hours after your surgery. This is common and usually lasts a short time. You may also have numbness around the incision area.    Keep doing the coughing and deep breathing exercises that you learned in the hospital. These will help to prevent lung infection.    Prevent constipation so you don t strain when going to the bathroom. Eat fruits, vegetables, and whole grains. Drink 6 to 8 glasses of water a day, unless otherwise directed. Use a laxative or a mild stool softener if your healthcare provider says it s OK.    Wash your incision with mild soap and water. Pat it dry. Don t use oil, powder, or lotion on your incision.    Shower or take baths as instructed by your healthcare provider. Instructions will vary based on how your incision was  closed and how it s healing. It may be closed with glue, sutures, or staples. Your healthcare provider may have different advice for each kind.  Activity    Ask others to help with chores and errands while you recover.    Don t lift anything heavier than 10 pounds until your healthcare provider says it s OK.    Don t mow the lawn, use a vacuum , or do other strenuous activities until your healthcare provider says it's OK.    Climb stairs slowly and pause after every few steps.    Walk as often as you feel able.    Ask your healthcare provider when you can drive again. This may be when you stop taking pain medicine and can move comfortably from side to side. Don t drive if you are still taking opioid pain medicine.  When to call your healthcare provider   Call your healthcare provider right away if you have any of the following:    Pain, bleeding, redness, or fluid at the incision site that gets worse    Fever of 100.4 F (38 C) or higher, or as directed by your healthcare provider    Vomiting or nausea that doesn t go away    Inability to urinate    No bowel movement after 3 days    Swelling in abdomen or groin that gets worse    Pain that s not relieved by medicine   Date Last Reviewed: 10/1/2016    5635-2858 [employer]. [address] [place] [address], [place] [address]. All rights reserved. This information is not intended as a substitute for professional medical care. Always follow your healthcare professional's instructions.                Hernia Repair Surgery  A hernia (or  rupture ) is a weakness or defect in the wall of the abdomen. A hernia will not heal on its own. Surgery is needed to repair the defect in the abdominal wall. If not treated, a hernia can get larger. It can also lead to serious health complications. Fortunately hernia surgery can be done quickly and safely. Below is an overview of hernia repair surgery.  Preparing for surgery  Your healthcare provider will talk with you about  getting ready for surgery. Follow all the instructions you re given and be sure to:    Tell your healthcare provider about any medicines, supplements, or herbs you take. This includes both prescription and over-the-counter items. Ask if you should stop taking any of them.    Stop taking aspirin, ibuprofen, naproxen, and other NSAIDs 7 to 14 days before surgery.    Arrange for an adult family member or friend to give you a ride home after surgery.    Stop smoking. Smoking affects blood flow and can slow healing.    Gently wash the surgical area the night before surgery.    Follow any directions you are given for not eating or drinking before surgery.  The day of surgery  Arrive at the hospital or surgical center at your scheduled time. You ll be asked to change into a patient gown. You ll then be given an IV to provide fluids and medicine. Shortly before surgery, an anesthesiologist or nurse anesthetist will talk with you. He or she will explain the types of anesthesia used to prevent pain during surgery. You will have one or more of the following:    Monitored sedation to make you relaxed and sleepy.    Local anesthesia to numb the surgical site.    Regional anesthesia to numb specific areas of your body.    General anesthesia to let you sleep during surgery.  Fixing the weakness  Surgery treats a hernia by repairing the weakness in the abdominal wall. Most hernias are treated using  tension-free  repairs. This is surgery that uses special mesh materials to repair the weak area. The mesh covers the weak area like a patch. The mesh is made of strong, flexible plastic that stays in the body. Over time, nearby tissues grow into the mesh to strengthen the repair.  After surgery  When the procedure is over, you ll be taken to the recovery area to rest. Your blood pressure and heart rate will be monitored. You ll also have a bandage over the surgical site. To help reduce discomfort, you ll be given pain medicines. You may  also be given breathing exercises to keep your lungs clear. Later you ll be asked to get up and walk. This helps prevent blood clots in the legs. You can go home when your healthcare provider says you re ready.     Risks and possible complications of hernia surgery    Bleeding    Infection    Numbness or pain in the groin or leg    Risk the hernia will recur    Damage to the testicles or testicular function   Anesthesia risks    Mesh complications    Inability to urinate    Bowel or bladder injury       Date Last Reviewed: 10/1/2016    5185-8778 [employer]. [address] [place] [address]. All rights reserved. This information is not intended as a substitute for professional medical care. Always follow your healthcare professional's instructions.                Oxycodone tablets or capsules  Brand Names: Dazidox, Endocodone, Oxaydo, OxyIR, Percolone, Roxicodone, Roxybond  What is this medicine?  OXYCODONE (ox i KOE done) is a pain reliever. It is used to treat moderate to severe pain.  How should I use this medicine?  Take this medicine by mouth with a glass of water. Follow the directions on the prescription label. You can take it with or without food. If it upsets your stomach, take it with food. Take your medicine at regular intervals. Do not take it more often than directed. Do not stop taking except on your doctor's advice.  Some brands of this medicine, like Oxecta, have special instructions. Ask your doctor or pharmacist if these directions are for you: Do not cut, crush or chew this medicine. Swallow only one tablet at a time. Do not wet, soak, or lick the tablet before you take it.  A special MedGuide will be given to you by the pharmacist with each prescription and refill. Be sure to read this information carefully each time.  Talk to your pediatrician regarding the use of this medicine in children. Special care may be needed.  What side effects may I notice from receiving this  medicine?  Side effects that you should report to your doctor or health care professional as soon as possible:    allergic reactions like skin rash, itching or hives, swelling of the face, lips, or tongue    breathing problems    confusion    signs and symptoms of low blood pressure like dizziness; feeling faint or lightheaded, falls; unusually weak or tired    trouble passing urine or change in the amount of urine    trouble swallowing  Side effects that usually do not require medical attention (report to your doctor or health care professional if they continue or are bothersome):    constipation    dry mouth    nausea, vomiting    tiredness  What may interact with this medicine?  This medicine may interact with the following medications:    alcohol    antihistamines for allergy, cough and cold    antiviral medicines for HIV or AIDS    atropine    certain antibiotics like clarithromycin, erythromycin, linezolid, rifampin    certain medicines for anxiety or sleep    certain medicines for bladder problems like oxybutynin, tolterodine    certain medicines for depression like amitriptyline, fluoxetine, sertraline    certain medicines for fungal infections like ketoconazole, itraconazole, voriconazole    certain medicines for migraine headache like almotriptan, eletriptan, frovatriptan, naratriptan, rizatriptan, sumatriptan, zolmitriptan    certain medicines for nausea or vomiting like dolasetron, ondansetron, palonosetron    certain medicines for Parkinson's disease like benztropine, trihexyphenidyl    certain medicines for seizures like phenobarbital, phenytoin, primidone    certain medicines for stomach problems like dicyclomine, hyoscyamine    certain medicines for travel sickness like scopolamine    diuretics    general anesthetics like halothane, isoflurane, methoxyflurane, propofol    ipratropium    local anesthetics like lidocaine, pramoxine, tetracaine    MAOIs like Carbex, Eldepryl, Marplan, Nardil, and  Parnate    medicines that relax muscles for surgery    methylene blue    nilotinib    other narcotic medicines for pain or cough    phenothiazines like chlorpromazine, mesoridazine, prochlorperazine, thioridazine  What if I miss a dose?  If you miss a dose, take it as soon as you can. If it is almost time for your next dose, take only that dose. Do not take double or extra doses.  Where should I keep my medicine?  Keep out of the reach of children. This medicine can be abused. Keep your medicine in a safe place to protect it from theft. Do not share this medicine with anyone. Selling or giving away this medicine is dangerous and against the law.  Store at room temperature between 15 and 30 degrees C (59 and 86 degrees F). Protect from light. Keep container tightly closed.  This medicine may cause harm and death if it is taken by other adults, children, or pets. Return medicine that has not been used to an official disposal site. Contact the [facility] at [phone] or your [facility] government to find a site. If you cannot return the medicine, flush it down the toilet. Do not use the medicine after the expiration date.  What should I tell my health care provider before I take this medicine?  They need to know if you have any of these conditions:    Aron's disease    brain tumor    head injury    heart disease    history of drug or alcohol abuse problem    if you often drink alcohol    kidney disease    liver disease    lung or breathing disease, like asthma    mental illness    pancreatic disease    seizures    thyroid disease    an unusual or allergic reaction to oxycodone, codeine, hydrocodone, morphine, other medicines, foods, dyes, or preservatives    pregnant or trying to get pregnant    breast-feeding  What should I watch for while using this medicine?  Tell your doctor or health care professional if your pain does not go away, if it gets worse, or if you have new or a different type of pain. You may develop  tolerance to the medicine. Tolerance means that you will need a higher dose of the medicine for pain relief. Tolerance is normal and is expected if you take this medicine for a long time.  Do not suddenly stop taking your medicine because you may develop a severe reaction. Your body becomes used to the medicine. This does NOT mean you are addicted. Addiction is a behavior related to getting and using a drug for a non-medical reason. If you have pain, you have a medical reason to take pain medicine. Your doctor will tell you how much medicine to take. If your doctor wants you to stop the medicine, the dose will be slowly lowered over time to avoid any side effects.  There are different types of narcotic medicines (opiates). If you take more than one type at the same time or if you are taking another medicine that also causes drowsiness, you may have more side effects. Give your health care provider a list of all medicines you use. Your doctor will tell you how much medicine to take. Do not take more medicine than directed. Call emergency for help if you have problems breathing or unusual sleepiness.  You may get drowsy or dizzy. Do not drive, use machinery, or do anything that needs mental alertness until you know how the medicine affects you. Do not stand or sit up quickly, especially if you are an older patient. This reduces the risk of dizzy or fainting spells. Alcohol may interfere with the effect of this medicine. Avoid alcoholic drinks.  This medicine will cause constipation. Try to have a bowel movement at least every 2 to 3 days. If you do not have a bowel movement for 3 days, call your doctor or health care professional.  Your mouth may get dry. Chewing sugarless gum or sucking hard candy, and drinking plenty of water may help. Contact your doctor if the problem does not go away or is severe.  NOTE:This sheet is a summary. It may not cover all possible information. If you have questions about this medicine,  talk to your doctor, pharmacist, or health care provider. Copyright  2018 [employer]                Patient Education    Cefazolin Sodium Solution for injection    Cefazolin Sodium, Dextrose Solution for injection  Cefazolin Sodium Solution for injection  What is this medicine?  CEFAZOLIN (joão diaz) is a cephalosprin antibiotic. It is used to treat or prevent certain kinds of bacterial infections. It will not work for colds, flu, or other viral infections.  This medicine may be used for other purposes; ask your health care provider or pharmacist if you have questions.  What should I tell my health care provider before I take this medicine?  They need to know if you have any of these conditions:    bleeding problems    diarrhea    kidney disease    liver disease    stomach or intestine problems (especially colitis)    an unusual or allergic reaction to cefazolin, other cephalosporin antibiotics, penicillin, penicillamine, other foods, dyes or preservatives    pregnant or trying to get pregnant    breast-feeding  How should I use this medicine?  This medicine is infused into a vein. Do not use your medicine more often than directed. Finish the full course prescribed by your doctor or health care professional even if you feel better. Do not stop using except on your doctor's advice.  Talk to your pediatrician regarding the use of this medicine in children. Special care may be needed. This medicine had been used in children as young as 1 month old.  Overdosage: If you think you have taken too much of this medicine contact a poison control center or emergency room at once.  NOTE: This medicine is only for you. Do not share this medicine with others.  What if I miss a dose?  If you miss a dose, use it as soon as you can. If it is almost time for your next dose, use only that dose. Do not use double or extra doses.  What may interact with this medicine?    birth control pills    blood thinners    other  antibiotics    probenecid  This list may not describe all possible interactions. Give your health care provider a list of all the medicines, herbs, non-prescription drugs, or dietary supplements you use. Also tell them if you smoke, drink alcohol, or use illegal drugs. Some items may interact with your medicine.  What should I watch for while using this medicine?  Tell your doctor or health care professional if your symptoms do not get better in a few days.  If you have diabetes you might get a false-positive result for sugar in your urine. Check with your doctor or health care professional before you change your diet or the dose of your diabetes medicine.  What side effects may I notice from receiving this medicine?  Side effects that you should report to your doctor or health care professional as soon as possible:    allergic reactions like skin rash, itching or hives, swelling of the face, lips, or tongue    breathing problems    fever or chills    redness, blistering, peeling or loosening of the skin, including inside the mouth    seizures    severe or watery diarrhea    sore throat    stomach pain or cramps    trouble passing urine or change in the amount of urine    unusual bleeding or bruising  Side effects that usually do not require medical attention (report to your doctor or health care professional if they continue or are bothersome):    diarrhea    genital or anal irritation    loss of appetite    nausea, vomiting    pain or redness where injected  This list may not describe all possible side effects. Call your doctor for medical advice about side effects. You may report side effects to FDA at 4-182-FDA-8548.  Where should I keep my medicine?  You will be instructed on how to store this medicine. Keep out of the reach of children.  NOTE:This sheet is a summary. It may not cover all possible information. If you have questions about this medicine, talk to your doctor, pharmacist, or health care provider.  Copyright  2016 [employer]                Prochlorperazine tablets  Brand Name: Compazine  What is this medicine?  PROCHLORPERAZINE (proe klor PER a zeen) helps to control severe nausea and vomiting. This medicine is also used to treat schizophrenia. It can also help patients who experience anxiety that is not due to psychological illness.  How should I use this medicine?  Take this medicine by mouth with a glass of water. Follow the directions on the prescription label. Take your doses at regular intervals. Do not take your medicine more often than directed. Do not stop taking this medicine suddenly. This can cause nausea, vomiting, and dizziness. Ask your doctor or health care professional for advice.  Talk to your pediatrician regarding the use of this medicine in children. Special care may be needed. While this drug may be prescribed for children as young as 2 years for selected conditions, precautions do apply.  What side effects may I notice from receiving this medicine?  Side effects that you should report to your doctor or health care professional as soon as possible:    blurred vision    breast enlargement in men or women    breast milk in women who are not breast-feeding    chest pain, fast or irregular heartbeat    confusion, restlessness    dark yellow or brown urine    difficulty breathing or swallowing    dizziness or fainting spells    drooling, shaking, movement difficulty (shuffling walk) or rigidity    fever, chills, sore throat    involuntary or uncontrollable movements of the eyes, mouth, head, arms, and legs    seizures    stomach area pain    unusually weak or tired    unusual bleeding or bruising    yellowing of skin or eyes  Side effects that usually do not require medical attention (report to your doctor or health care professional if they continue or are bothersome):    difficulty passing urine    difficulty sleeping    headache    sexual dysfunction    skin rash, or itching  What may  interact with this medicine?  Do not take this medicine with any of the following medications:    amoxapine    antidepressants like citalopram, escitalopram, fluoxetine, paroxetine, and sertraline    deferoxamine    dofetilide    maprotiline    tricyclic antidepressants like amitriptyline, clomipramine, imipramine, nortiptyline and others  This medicine may also interact with the following medications:    lithium    medicines for pain    phenytoin    propranolol    warfarin  What if I miss a dose?  If you miss a dose, take it as soon as you can. If it is almost time for your next dose, take only that dose. Do not take double or extra doses.  Where should I keep my medicine?  Keep out of the reach of children.  Store at room temperature between 15 and 30 degrees C (59 and 86 degrees F). Protect from light. Throw away any unused medicine after the expiration date.  What should I tell my health care provider before I take this medicine?  They need to know if you have any of these conditions:    blood disorders or disease    dementia    liver disease or jaundice    Parkinson's disease    uncontrollable movement disorder    an unusual or allergic reaction to prochlorperazine, other medicines, foods, dyes, or preservatives    pregnant or trying to get pregnant    breast-feeding  What should I watch for while using this medicine?  Visit your doctor or health care professional for regular checks on your progress.  You may get drowsy or dizzy. Do not drive, use machinery, or do anything that needs mental alertness until you know how this medicine affects you. Do not stand or sit up quickly, especially if you are an older patient. This reduces the risk of dizzy or fainting spells. Alcohol may interfere with the effect of this medicine. Avoid alcoholic drinks.  This medicine can reduce the response of your body to heat or cold. Dress warm in cold weather and stay hydrated in hot weather. If possible, avoid extreme temperatures  like saunas, hot tubs, very hot or cold showers, or activities that can cause dehydration such as vigorous exercise.  This medicine can make you more sensitive to the sun. Keep out of the sun. If you cannot avoid being in the sun, wear protective clothing and use sunscreen. Do not use sun lamps or tanning beds/booths.  Your mouth may get dry. Chewing sugarless gum or sucking hard candy, and drinking plenty of water may help. Contact your doctor if the problem does not go away or is severe.  NOTE:This sheet is a summary. It may not cover all possible information. If you have questions about this medicine, talk to your doctor, pharmacist, or health care provider. Copyright  2018 [employer]                Docusate Sodium; Senna tablets or capsules  Brand Names: Colace, Dok Plus, Laxacin, Ruthie-Colace, Senexon-S, Senna Plus, SennaLax-S, Senna-S, Senna-Time-S, Senohot-S, SenoSol-SS  What is this medicine?  DOCUSATE SODIUM; SENNA (doc CUE sayt FAY liss um; SEN na) contains a stool softener and a laxative. It is used to treat constipation.  How should I use this medicine?  Take this medicine by mouth with a full glass of water. Follow the directions on the label. Take your doses at regular intervals. Do not take your medicine more often than directed.  Talk to your pediatrician regarding the use of this medicine in children. While this medicine may be prescribed for children as young as 2 years for selected conditions, precautions do apply.  What side effects may I notice from receiving this medicine?  Side effects that you should report to your doctor or health care professional as soon as possible:    allergic reactions like skin rash, itching or hives, swelling of the face, lips, or tongue    muscle weakness    unusually weak or tired    unusual weight loss  Side effects that usually do not require medical attention (report to your doctor or health care professional if they continue or are  bothersome):    diarrhea    discolored urine    nausea, vomiting    stomach cramps    throat irritation  What may interact with this medicine?    mineral oil    What if I miss a dose?  If you miss a dose, take it as soon as you can. If it is almost time for your next dose, take only that dose. Do not take double or extra doses.  Where should I keep my medicine?  Keep out of the reach of children.  Store at room temperature between 15 and 30 degrees C (59 and 86 degrees F). Throw away any unused medicine after the expiration date.  What should I tell my health care provider before I take this medicine?  They need to know if you have any of these conditions:    nausea or vomiting    severe constipation    stomach pain    sudden change in bowel habit lasting more than 2 weeks    an unusual or allergic reaction to docusate, senna, other medicines, foods, dyes, or preservatives    pregnant or trying to get pregnant    breast-feeding  What should I watch for while using this medicine?  Do not use for more than one week without advice from your doctor or health care professional. Long-term use can make your body depend on the laxative for regular bowel movements, damage the bowel, cause malnutrition, and problems with the amounts of water and salts in your body. If your constipation keeps returning, check with your doctor or health care professional.  Drink plenty of water while taking this medicine. This will help fight constipation.  Stop using this medicine and contact your doctor or health care professional if you experience any rectal bleeding or do not have a bowel movement after use. These could be signs of a more serious condition.  NOTE:This sheet is a summary. It may not cover all possible information. If you have questions about this medicine, talk to your doctor, pharmacist, or health care provider. Copyright  2018 [employer]

## 2018-11-28 NOTE — OR NURSING
"Respiratory here and albuterol neb given. LS are still diminished, and intermittent stridor with inspiration when Pt becomes anxious. Overall, LS are improved. SaO2 100% on 2.5LPM. HR in the 60s to 70s. /80 currently. Pt continues to arch back, grasp chest, and make stridorous sounds intermittently. Stridor resolves quickly. My observation is it appears to be more anxiety driven then true respiratory distress. Dr. Day contacted, and order obtained for Vistaril IM to help Pt relax. Also loosened both abdominal binders some more. Rating pain 8/10. Making statements to let needs be know. For example, \"cold\" \"swab\" \"pain\" etc. Will continue to monitor.   "

## 2018-11-28 NOTE — IP AVS SNAPSHOT
[facility]    [address] [address]    Phone:  [phone]    Fax:  [phone]                                       After Visit Summary   11/28/2018    Sola Gan    MRN: [id number]           After Visit Summary Signature Page     I have received my discharge instructions, and my questions have been answered. I have discussed any challenges I see with this plan with the nurse or doctor.    ..........................................................................................................................................  Patient/Patient Representative Signature      ..........................................................................................................................................  Patient Representative Print Name and Relationship to Patient    ..................................................               ................................................  Date                                   Time    ..........................................................................................................................................  Reviewed by Signature/Title    ...................................................              ..............................................  Date                                               Time          22EPIC Rev 08/18

## 2018-11-28 NOTE — OR NURSING
Pt brought to PACU at 1052. Pt rubbing chest, and stating it is hard to breathe, feels very tight. Pt does have abdominal binders on x2. Pt having inspiratory wheezes bilaterally. Pt anxious, and patting chest, arching head back. SaO2 100% on 3LPN via NC. Abdominal binder over ribcage loosened, and RT contacted for neb treatment. Supporting Pt throughout with calm reassurance that oxygen levels are 100%, and that she is getting plenty of oxygen even though it feels like she can't breathe. Encouraged to relax, and try to take deep breaths. Will continue to monitor.

## 2018-11-28 NOTE — ANESTHESIA POSTPROCEDURE EVALUATION
Patient: Sola Gan    Procedure(s):  LAPAROSCOPIC VENTRAL HERNIA REPAIR  PORT-A-CATH REMOVAL    Diagnosis:INCISIONAL HERNIA W/O OBSTRUCTION OR GANGRENE/PORT  Diagnosis Additional Information: No value filed.    Anesthesia Type:  General, ETT    Note:  Anesthesia Post Evaluation    Patient location during evaluation: Phase 2, PACU and Bedside  Patient participation: Able to fully participate in evaluation  Level of consciousness: awake and alert  Pain management: adequate  Airway patency: patent  Cardiovascular status: acceptable  Respiratory status: acceptable  Hydration status: stable  PONV: none     Anesthetic complications: None          Last vitals:  Vitals:    11/28/18 1230 11/28/18 1242 11/28/18 1245   BP: 130/68  129/68   Resp: 18 18 18   Temp:      SpO2: 94%  93%         Electronically Signed By: Dmitry Day MD  November 28, 2018  1:09 PM

## 2018-11-28 NOTE — OP NOTE
Procedure Date: 11/28/2018      PREOPERATIVE DIAGNOSIS:  Incisional hernia.      POSTOPERATIVE DIAGNOSIS:  Incisional hernia.      PROCEDURES PERFORMED:     1.  Laparoscopic ventral hernia repair.   2.  Port-A-Cath removal.      SURGEON:  Rigo Coreas DO      WOUND CLASSIFICATION:  Class 1, clean.      DRAINS:  Zero.      INDICATIONS:  A 67-year-old female 2 years out from a laparoscopic sigmoid colectomy for adenocarcinoma. finished her chemotherapy for 3 positive lymph nodes, now with a 1-year complaint of a bulge in the right lower quadrant beneath one of the incisional scars.  CT demonstrated multiple loops of small bowel in the hernia.  This is symptomatic, causing her pain, bloating, discomfort and inability to exercise.  She is here for definitive surgical management.      DESCRIPTION OF PROCEDURE:  The patient was brought into the operating room and placed supine on the operating table.  After general endotracheal anesthesia was administered, the abdomen and chest were prepped and draped in the usual sterile fashion.  After preprocedural pause, local anesthetic was infiltrated in transverse fashion in the left upper quadrant and a small incision was made.  A Visiport then was introduced into the abdominal cavity under direct visualization and pneumoperitoneum was established and 2 5 mm working ports were placed in the left lower quadrant. Bilateral transversus abdominus plane blocks were placed for post operative pain control.  Inspection of the abdomen revealed some very small adhesions from the greater omentum to the anterior abdominal wall.  These were taken down bluntly.  There was a large hernia defect in the right lower quadrant containing multiple loops of small bowel.  This was easily reduced back into the abdominal cavity.  The hernia sac then was excised using electrocautery and blunt dissection.  I did get into a little bit of bleeding that needed to be controlled with the LigaSure.  Once the  hernia sac was completely excised, the hernia defect was measured at 5 cm.  Inspection of the hernia sac revealed that hemostasis was excellent.  The hernia defect then was closed with interrupted 0 PDS sutures.  A 10x15 mesh then was introduced into the abdominal cavity and then affixed below the repaired defect using tacks circumferentially.  The hernia mesh laid flat with nice coverage of the defect with an appropriate amount of overlap.  The ports were removed under direct visualization, thus releasing pneumoperitoneum.  All skin incisions were closed with a running 4-0 Monocryl and Steri-Strips applied.        Next, my attention was turned towards the Port-A-Cath in the left upper chest.  Local anesthetic was infiltrated.  The old scar was sharply excised  and this was carried down to the capsule using electrocautery.  The capsule was entered, the anchoring sutures then were sharply divided, and the port was removed in 1 piece.  The tunnel for the catheter then was oversewn with interrupted 2-0 Vicryl.  The capsule was excised using electrocautery.  Hemostasis was excellent.  The wound was reapproximated with interrupted 2-0 Vicryls and skin was closed with a running 4-0 Monocryl.        All counts were correct.  The patient tolerated the procedure well and taken to postanesthesia care unit.         MADINA EDWARDS DO             D: 2018   T: 2018   MT: RO      Name:     MONA MCKEE   MRN:      [id number]        Account:        [id number]   :      1950           Procedure Date: 2018      Document: [id number]

## 2018-11-28 NOTE — ANESTHESIA CARE TRANSFER NOTE
Patient: Sola Gan    Procedure(s):  LAPAROSCOPIC VENTRAL HERNIA REPAIR  PORT-A-CATH REMOVAL    Diagnosis: INCISIONAL HERNIA W/O OBSTRUCTION OR GANGRENE/PORT  Diagnosis Additional Information: No value filed.    Anesthesia Type:   General, ETT     Note:  Airway :Face Mask  Patient transferred to:PACU  Comments: Patient to PACU on 10L O2 via FM and is awake, alert, and verbal. Report to RN and transfer of care. VSS.      Vitals: (Last set prior to Anesthesia Care Transfer)    CRNA VITALS  11/28/2018 1021 - 11/28/2018 1057      11/28/2018             Resp Rate (set): 8                Electronically Signed By: ARABELLA Reed CRNA  November 28, 2018  10:57 AM

## 2018-11-28 NOTE — BRIEF OP NOTE
[facility] - Brief Operative Note    Pre-operative diagnosis: Incisional hernia   Post-operative diagnosis Incisional hernia   Procedure: Laparoscopic ventral hernia repair, port a cath removal   Surgeon: Rigo Coreas FACS, DO   Anesthesia: General    Estimated blood loss: 50 mL   Blood transfusion: No transfusion was given during surgery   Drains: 0   Specimens: Hernia sack   Findings: Large incisional hernia with small bowel    Complications: None   Condition: Stable   Comments: Details included in dictated operative note.

## 2018-11-28 NOTE — DISCHARGE INSTRUCTIONS
POST OPERATIVE PATIENT INFORMATION  Hernia Repair      Home care following hernia repair:  You will be discharged when you are able to be driven home.  Anesthesia may reduce judgment, reaction time and coordination for several hours after you seemingly have recovered.  Therefore, do not operate any motorized vehicles or power tools for 24 hours after discharge.  You should also not be alone for 24 hours after surgery following General Anesthesia..    Activity:  After arriving home, it is suggested that you rest or do quiet activities for the rest of the day.  The next day you may be as active as you feel able.  You may find too, that you require more rest than usual the first 3-4 days as your body heals.  Check with your doctor when you may resume normal activities.    Diet:  Eat small amounts frequently after arriving home.  Begin with clear liquids such as ginger ale, lemon-lime drinks, Jell-O, popsicles and clear soups in small quantities.  Gradually increase your diet to include other juices, creamed soups and solids as tolerated.  Avoid foods the day of surgery which are hard to digest such as heavy, sweet, highly spiced, or fried foods.    Emesis (vomiting) sometimes occurs after general anesthesia.  If emesis persists more than 5-7 times after arriving home, or if you have other difficulties, call your doctor.    Medications:  If you have prescriptions from your doctor, take them as prescribed until they are gone and/or need to be refilled.    Other:  1. Difficulty in urination can occur post operatively.  If you have any problems, call our physician or go to the ER.  2. No lifting over 15 lbs for 4 weeks.   3. No active sports (biking, riding, skating, swimming, etc.).  4. Early on, it's common for the area around your incision to be swollen, bruised, and sore.     Dressing:  Leave your dressing on as directed:     Incision Care  Remember: Follow-up visits allow your healthcare provider to make sure your  incision is healing well. Be sure to keep your appointments.     Stitches (sutures), surgical staples, special strips of surgical tape, or surgical skin glue may be used to close incisions. They also help stop bleeding and speed healing. To help your incision heal, follow the tips on this handout.  Home care  Tips for home care include the following:    Always wash your hands before touching your incision.    Keep your incision clean and dry.    Avoid doing things that could cause dirt or sweat to get on your incision.    Don t pick at scabs. They help protect the wound.    Keep your incision out of water.    Take a sponge bath to avoid getting your incision wet, unless your healthcare provider tells you otherwise.    Ask your provider when can you take a shower or bathe.    Ask your provider about the best way to keep your incision dry when bathing or showering.    Pat stitches dry if they get wet. Don t rub.    Leave the bandage (dressing) in place until you are told to remove it or change it. Change it only as directed, using clean hands.    After the first 12 hours, change your dressing every 24 hours, or as directed by your healthcare provider.    Change your dressing if it gets wet or soiled.  Care for specific closures  Follow these guidelines unless your healthcare provider tells you otherwise:    Stitches or staples. Once you no longer need to keep these dry, clean the wound daily. First remove the bandage using clean hands. Then wash the area gently with soap and warm water. Use a wet cotton swab to loosen and remove any blood or crust that forms. After cleaning, put a thin layer of antibiotic ointment on. Then put on a new bandage.    Skin glue. Don t put liquid, ointment, or cream on your wound while the glue is in place. Avoid activities that cause heavy sweating. Protect the wound from sunlight. Do not scratch, rub, or pick at the glue. Do not put tape directly over the glue. The glue should peel off  within 5 to 10 days.    Surgical tape. Keep the area dry. If it gets wet, blot the area dry with a clean towel. Surgical tape usually falls off within 7 to 10 days. If it has not fallen off after 10 days, contact your healthcare provider before taking it off yourself. If you are told to remove the tape, put mineral oil or petroleum jelly on a cotton ball. Gently rub the tape until it is removed.  Changing your dressing  Leave the dressing (bandage) in place until you are told to remove it or change it. Follow the instructions below unless told otherwise by your healthcare provider:    Always wash your hands before changing your dressing.    After the first 48 hours, the incision wound usually will have closed. At this point, leave the incision uncovered and open to the air. If the incision has not closed keep it covered.    Cover your incision only if your clothing is rubbing it or causing irritation.    Change your dressing if it gets wet or soiled.    For comfort, men may wear scrotal support after inguinal hernia repair.    Drainage:   Bleeding or drainage should be minimal.  1. If bleeding should soak the dressings, cover with another dressing. Do not remove the original dressing.  2. If bleeding continues, apply gentle steady pressure over the incision for 5 minutes.  3. If bleeding persists or there is an increased swelling of the area, call your surgeon or return to the Emergency Room.    When to call your healthcare provider:  Call your healthcare provider if you have any of the following:    A large amount of swelling or bruising (Men: may notice testicular swelling and bruising. This is normal)    Fever of 101.5*F (38*C) or higher, or as directed by your healthcare provider.    Pain, redness, bleeding, or fluid from the incision that gets worse    Drainage with an odor    Trouble urinating    Constipation    Vomiting         QUESTIONS?:  Please feel free to call your healthcare provider or the hospital   if you have any questions, and they will be happy to assist you.         If you have any questions about these instructions, feel free to ask the nurse before discharge.  If you have difficulty after surgery and are unable to contact your physician at his/her clinic, call the [facility] Emergency Room for assistance at [phone].    Post-Anesthesia Patient Instructions    IMMEDIATELY FOLLOWING SURGERY:  Do not drive or operate machinery for the first twenty four hours after surgery.  Do not make any important decisions for twenty four hours after surgery or while taking narcotic pain medications or sedatives.  If you develop intractable nausea and vomiting or a severe headache please notify your doctor immediately.    FOLLOW-UP:  Please make an appointment with your surgeon as instructed. You do not need to follow up with anesthesia unless specifically instructed to do so.    WOUND CARE INSTRUCTIONS (if applicable):  Keep a dry clean dressing on the anesthesia/puncture wound site if there is drainage.  Once the wound has quit draining you may leave it open to air.  Generally you should leave the bandage intact for twenty four hours unless there is drainage.  If the epidural site drains for more than 36-48 hours please call the anesthesia department.    QUESTIONS?:  Please feel free to call your physician or the hospital  if you have any questions, and they will be happy to assist you.

## 2018-11-28 NOTE — PLAN OF CARE
"[facility] Observation Note:  Patient is registered to observation and is in 3224/3224-1 at approximately 1400 via cart accompanied by staff from surgery . Report received from Silvia HANNA in SBAR format at 1400 via face to face in room. Patient transferred to bed via self.. Patient is alert and oriented X 3, reports pain; rates at 2 on 0-10 scale.  Patient oriented to room, unit, hourly rounding, and plan of care. Explained the admission packet including \"What is Observation Status\" and patient handbook with patient bill of rights brochure. Will continue to monitor and document as needed.  Nursing Observation criteria listed below was met:  Health care directives status obtained and documented: N/A  Care Everywhere authorization completed N/A    MRSA swab completed for patient 65 years and older: pending    If initial lactic acid >2.0, repeat lactic acid drawn within one hour of arrival to unit: NA. If no, state reason: N/A    Patient identifies a surrogate decision maker: Yes If yes, who:svetlana Samson  Contact Information: [phone]  Vaccination assessment and education completed: Yes   Vaccinations received prior to hospitalization: Pneumovax yes  Influenza(seasonal)  YES   Vaccination(s) ordered: patient declines    Skin issues/needs documented:Yes  Isolation Patient: no Education given and documented, correct sign in place and documentation row added to PCS:  No    Fall Prevention: Observation fall risk completed:  Yes Education given and documented, sticker and magnet in place: Yes    General Care Plan initiated with observation goal(s): Yes  Education (including assessment) Documented: Yes  New medication information given to patient and documented: Yes  Patient elects to use own medications from home during hospitalization:  No If yes, a MD order was obtained to use Medications from Home:  No and home medications were sent to Pharmacy for verification for use during hospitalization: No  Patient has " discharge needs (If yes, please explain): No

## 2018-11-28 NOTE — PLAN OF CARE
Face to face report given with opportunity to observe patient.    Report given to Mariano Diaz RN  11/28/2018  3:43 PM

## 2018-11-29 VITALS
WEIGHT: 210 LBS | BODY MASS INDEX: 39.65 KG/M2 | TEMPERATURE: 99.3 F | SYSTOLIC BLOOD PRESSURE: 107 MMHG | OXYGEN SATURATION: 95 % | RESPIRATION RATE: 16 BRPM | DIASTOLIC BLOOD PRESSURE: 55 MMHG | HEIGHT: 61 IN

## 2018-11-29 LAB — GLUCOSE BLDC GLUCOMTR-MCNC: 151 MG/DL (ref 70–99)

## 2018-11-29 PROCEDURE — 25000132 ZZH RX MED GY IP 250 OP 250 PS 637: Performed by: SURGERY

## 2018-11-29 PROCEDURE — 25000131 ZZH RX MED GY IP 250 OP 636 PS 637: Performed by: SURGERY

## 2018-11-29 PROCEDURE — A9270 NON-COVERED ITEM OR SERVICE: HCPCS | Performed by: SURGERY

## 2018-11-29 PROCEDURE — 82962 GLUCOSE BLOOD TEST: CPT

## 2018-11-29 RX ORDER — PROCHLORPERAZINE MALEATE 10 MG
10 TABLET ORAL EVERY 6 HOURS PRN
Qty: 10 TABLET | Refills: 1 | Status: SHIPPED | OUTPATIENT
Start: 2018-11-29

## 2018-11-29 RX ADMIN — OXYCODONE HYDROCHLORIDE 10 MG: 5 TABLET ORAL at 04:00

## 2018-11-29 RX ADMIN — PROCHLORPERAZINE MALEATE 5 MG: 5 TABLET, FILM COATED ORAL at 04:42

## 2018-11-29 RX ADMIN — OXYCODONE HYDROCHLORIDE 5 MG: 5 TABLET ORAL at 09:37

## 2018-11-29 RX ADMIN — RANITIDINE 150 MG: 150 TABLET ORAL at 03:28

## 2018-11-29 RX ADMIN — OXYCODONE HYDROCHLORIDE 5 MG: 5 TABLET ORAL at 00:10

## 2018-11-29 RX ADMIN — ONDANSETRON 4 MG: 4 TABLET, ORALLY DISINTEGRATING ORAL at 00:55

## 2018-11-29 ASSESSMENT — PAIN DESCRIPTION - DESCRIPTORS: DESCRIPTORS: ACHING

## 2018-11-29 NOTE — PLAN OF CARE
Problem: Patient Care Overview  Goal: Plan of Care/Patient Progress Review  Outcome: Adequate for Discharge Date Met: 11/29/18  Patient vitals stable. Abdominal incisions covered with bandaids and steri strips. Left chest wound covered with dressing. No drainage or signs of infection noted. Alert and oriented. Lungs clear. Apical rate regular. Audible bowel sounds. CMS intact. Complained of 7/10 pain in abdomen. 5 mg oxycodone administered. Stable on feet. Tolerated regular diet well.  Discharged at 1015.

## 2018-11-29 NOTE — PLAN OF CARE
Problem: Patient Care Overview  Goal: Plan of Care/Patient Progress Review  Outcome: No Change  Pt is alert and oriented. VSS/afebrile. C/o abdominal pain, medicated with roxicodone with adequate results. Pt also c/o nausea, medicated with po zofran with inadequate results, pt then medicated with po compazine with effective results. Pt also given peppermint essential oils. Bowel sounds active, lungs clear, equal bilat. Pt   Ambulating to BR with assist of 1. Uses call light appropriately, makes needs known.      11/29/18 0530   OTHER   Plan Of Care Reviewed With patient   Plan of Care Review   Progress no change

## 2018-11-29 NOTE — PLAN OF CARE
Problem: Patient Care Overview  Goal: Plan of Care/Patient Progress Review  Outcome: Improving  VSS, rates pain 2-5/10 given Martha w/effect. LS CTA bilat. BS normoactive states passing small amount of gas. Troch sites WNL absent of drainage/erythema. Bandages in place. Dressing to excised port area CDI, dressing left in place. Voiding adequate urine w/o difficulty. Ambulating SBA in rm. Tolerating PO diet absent of N/V. IV SL'd flushing w/o difficulty. Call light w/in reach.     Face to face report given with opportunity to observe patient.    Report given to Claudette YOUSSEF RN.     Mariano Spencer   11/28/2018  11:24 PM

## 2018-11-29 NOTE — PLAN OF CARE
Face to face report given with opportunity to observe patient.    Report given to Jovita Rivera   11/29/2018  7:39 AM

## 2018-11-29 NOTE — PLAN OF CARE
Patient vitals stable. Abdominal incisions covered with bandaids and steri strips. Left chest wound covered with dressing. No drainage or signs of infection noted. Alert and oriented. Lungs clear. Apical rate regular. Audible bowel sounds. CMS intact. Complained of 7/10 pain in abdomen. 5 mg oxycodone administered. Stable on feet. Tolerated regular diet well.  Discharged at 1015.     Patient discharged at 10:20 AM via wheel chair accompanied by daughter and staff. Prescriptions sent with patient to fill . All belongings sent with patient.     Discharge instructions reviewed with Patient and daughter. Listed belongings gathered and returned to patient. Clothes, cell phone    Patient discharged to home.     Core Measures and Vaccines  Core Measures applicable during stay: No. If yes, state diagnosis:  Pneumonia and Influenza given prior to discharge, if indicated: No and N/A    Surgical Patient   Surgical Procedures during stay: Incisional hernia  Did patient receive discharge instruction on wound care and recognition of infection symptoms? Yes    MISC  Follow up appointment made:  Yes  Home and hospital aquired medications returned to patient: Yes  Patient reports pain was well managed at discharge: Yes

## 2018-11-30 LAB
BACTERIA SPEC CULT: NORMAL
COPATH REPORT: NORMAL
SPECIMEN SOURCE: NORMAL

## 2018-12-03 ENCOUNTER — TELEPHONE (OUTPATIENT)
Dept: SURGERY | Facility: OTHER | Age: 68
End: 2018-12-03

## 2018-12-03 NOTE — TELEPHONE ENCOUNTER
Forms faxed from [employer] for short term disability following hernia surgery 11-  They were completed and faxed to the number on there  Form, the originals will be given to patient on follow up appt on 12-7-18 and a copy was made to be scanned for her records

## 2018-12-07 ENCOUNTER — OFFICE VISIT (OUTPATIENT)
Dept: SURGERY | Facility: OTHER | Age: 68
End: 2018-12-07
Attending: SURGERY
Payer: MEDICARE

## 2018-12-07 VITALS
SYSTOLIC BLOOD PRESSURE: 118 MMHG | DIASTOLIC BLOOD PRESSURE: 58 MMHG | BODY MASS INDEX: 40.5 KG/M2 | HEIGHT: 61 IN | OXYGEN SATURATION: 99 % | HEART RATE: 81 BPM | TEMPERATURE: 98.1 F | WEIGHT: 214.5 LBS | RESPIRATION RATE: 18 BRPM

## 2018-12-07 DIAGNOSIS — Z87.19 S/P LAPAROSCOPIC HERNIA REPAIR: Primary | ICD-10-CM

## 2018-12-07 DIAGNOSIS — Z98.890 S/P LAPAROSCOPIC HERNIA REPAIR: Primary | ICD-10-CM

## 2018-12-07 PROCEDURE — G0463 HOSPITAL OUTPT CLINIC VISIT: HCPCS

## 2018-12-07 PROCEDURE — 99024 POSTOP FOLLOW-UP VISIT: CPT | Performed by: SURGERY

## 2018-12-07 ASSESSMENT — PAIN SCALES - GENERAL: PAINLEVEL: MILD PAIN (2)

## 2018-12-07 NOTE — PATIENT INSTRUCTIONS
Thank you for allowing Dr. Coreas and our surgical team to participate in your care. Please call with any scheduling questions or concerns to our health unit coordinator at [phone] or for nursing questions to the nurse at [phone].

## 2018-12-07 NOTE — NURSING NOTE
"Chief Complaint   Patient presents with     Surgical Followup       Initial /58 (BP Location: Right arm, Patient Position: Sitting, Cuff Size: Adult Large)  Pulse 81  Temp 98.1  F (36.7  C) (Tympanic)  Resp 18  Ht 5' 1\" (1.549 m)  Wt 214 lb 8 oz (97.3 kg)  SpO2 99%  BMI 40.53 kg/m2 Estimated body mass index is 40.53 kg/(m^2) as calculated from the following:    Height as of this encounter: 5' 1\" (1.549 m).    Weight as of this encounter: 214 lb 8 oz (97.3 kg).  Medication Reconciliation: complete    Michela Guzman LPN    "

## 2018-12-07 NOTE — MR AVS SNAPSHOT
After Visit Summary   12/7/2018    Sola Gan    MRN: [id number]           Patient Information     Date Of Birth          1950        Visit Information        Provider Department      12/7/2018 9:15 AM Rigo Coreas DO [facility]        Today's Diagnoses     S/P laparoscopic hernia repair    -  1       Follow-ups after your visit        Your next 10 appointments already scheduled     Dec 21, 2018 11:45 AM CST   (Arrive by 11:30 AM)   Return Visit with Rigo Coreas DO   [facility] ([facility] )    [address] [place] [address]   [phone]              Who to contact     If you have questions or need follow up information about today's clinic visit or your schedule please contact [facility] directly at [phone].  Normal or non-critical lab and imaging results will be communicated to you by MyChart, letter or phone within 4 business days after the clinic has received the results. If you do not hear from us within 7 days, please contact the clinic through MyChart or phone. If you have a critical or abnormal lab result, we will notify you by phone as soon as possible.  Submit refill requests through [facility] or call your pharmacy and they will forward the refill request to us. Please allow 3 business days for your refill to be completed.          Additional Information About Your Visit        MyChart Information     [facility] gives you secure access to your electronic health record. If you see a primary care provider, you can also send messages to your care team and make appointments. If you have questions, please call your primary care clinic.  If you do not have a primary care provider, please call [phone] and they will assist you.        Care EveryWhere ID     This is your Care EveryWhere ID. This could be used by other organizations to access your [facility]  "records  [id number]        Your Vitals Were     Pulse Temperature Respirations Height Pulse Oximetry BMI (Body Mass Index)    81 98.1  F (36.7  C) (Tympanic) 18 5' 1\" (1.549 m) 99% 40.53 kg/m2       Blood Pressure from Last 3 Encounters:   12/07/18 118/58   11/29/18 107/55   11/13/18 128/72    Weight from Last 3 Encounters:   12/07/18 214 lb 8 oz (97.3 kg)   11/28/18 210 lb (95.3 kg)   11/13/18 214 lb (97.1 kg)              Today, you had the following     No orders found for display       Primary Care Provider Office Phone # Fax #    Favian Recinos [phone]       [address] [place] [address]        Equal Access to Services     LANEY WATKINS : Hadii nicol gaffney hadasho Soomaali, waaxda luqadaha, qaybta kaalmada adeegyada, madai keller . So [facility] [phone].    ATENCIÓN: Si habla español, tiene a sanchez disposición servicios gratuitos de asistencia lingüística. Ritesh al [phone].    We comply with applicable federal civil rights laws and [place] laws. We do not discriminate on the basis of race, color, national origin, age, disability, sex, sexual orientation, or gender identity.            Thank you!     Thank you for choosing [facility]  for your care. Our goal is always to provide you with excellent care. Hearing back from our patients is one way we can continue to improve our services. Please take a few minutes to complete the written survey that you may receive in the mail after your visit with us. Thank you!             Your Updated Medication List - Protect others around you: Learn how to safely use, store and throw away your medicines at www.disposemymeds.org.          This list is accurate as of 12/7/18  9:52 AM.  Always use your most recent med list.                   Brand Name Dispense Instructions for use Diagnosis    ADVIL PO      Take 600-800 mg by mouth every 8 hours as needed for moderate pain        atorvastatin 40 MG " tablet    LIPITOR    90 tablet    TAKE 1 TABLET(40 MG) BY MOUTH DAILY    Controlled type 2 diabetes mellitus without complication, without long-term current use of insulin (H)       BENADRYL PO      Take 25 mg by mouth nightly as needed for sleep        blood glucose monitoring lancets     102 each    Use to test blood sugar 2 times daily.    Type 2 diabetes mellitus with hyperglycemia, without long-term current use of insulin (H)       blood glucose monitoring meter device kit     1 kit    Use to test blood sugar 2 times daily .    Type 2 diabetes mellitus with hyperglycemia, without long-term current use of insulin (H)       blood glucose monitoring test strip    ACCU-CHEK YOLANDA PLUS    100 each    Use to test blood sugar 2 times daily.    Type 2 diabetes mellitus with hyperglycemia, without long-term current use of insulin (H)       COMBIVENT RESPIMAT  MCG/ACT inhaler   Generic drug:  Ipratropium-Albuterol     4 g    INHALE 1 PUFF INTO THE LUNGS FOUR TIMES DAILY AS NEEDED FOR SHORTNESS OF BREATH    Other emphysema (H)       FLUoxetine 20 MG capsule    PROzac    90 capsule    Take 1 capsule (20 mg) by mouth daily    Other depression       lisinopril 10 MG tablet    PRINIVIL/ZESTRIL    30 tablet    TAKE 1 TABLET(10 MG) BY MOUTH DAILY    Benign essential hypertension       loratadine-pseudoePHEDrine  MG 24 hr tablet    CLARITIN-D 24-HOUR     Take 1 tablet by mouth daily        metFORMIN 500 MG tablet    GLUCOPHAGE    90 tablet    TAKE 1 TABLET BY MOUTH THREE TIMES DAILY WITH MEALS.    Type 2 diabetes mellitus without complication, without long-term current use of insulin (H)       order for DME     2 each    Equipment being ordered: chang socks    Venous stasis dermatitis of both lower extremities       oxyCODONE 5 MG tablet    ROXICODONE    20 tablet    Take 1-2 tablets (5-10 mg) by mouth every 6 hours as needed for pain (Moderate to Severe)    S/P hernia surgery       pantoprazole 40 MG EC tablet     PROTONIX    60 tablet    TAKE 1 TABLET(40 MG) BY MOUTH TWICE DAILY BEFORE MEALS    Abdominal pain, epigastric       PRIMATENE ASTHMA 12.5-200 MG Tabs   Generic drug:  ePHEDrine-GuaiFENesin      Take 1 tablet by mouth nightly as needed        PROAIR  (90 Base) MCG/ACT inhaler   Generic drug:  albuterol     8.5 g    INHALE 2 PUFFS BY MOUTH EVERY 4 HOURS AS NEEDED FOR SHORTNESS OF BREATH    Shortness of breath       prochlorperazine 10 MG tablet    COMPAZINE    10 tablet    Take 1 tablet (10 mg) by mouth every 6 hours as needed for nausea or vomiting    S/P hernia surgery       senna-docusate 8.6-50 MG tablet    SENOKOT-S/PERICOLACE    30 tablet    Take 1-2 tablets by mouth 2 times daily Take while on oral narcotics to prevent or treat constipation.    S/P hernia surgery       triamcinolone 0.5 % external cream    KENALOG    30 g    Apply sparingly to affected area three times daily.    Venous stasis dermatitis of both lower extremities

## 2018-12-07 NOTE — PROGRESS NOTES
"S: Mrs. Gan returns one week after laparoscopic incisional hernia repair and port a cath removal.  Doing well post operatively, tolerating a general diet, having regular bowel movements, passing flatus, no diarrhea, doesn't take pain narcotics regularly.  Specifically denies fevers, chills, nausea, vomiting, shortness of breath.    O:  /58 (BP Location: Right arm, Patient Position: Sitting, Cuff Size: Adult Large)  Pulse 81  Temp 98.1  F (36.7  C) (Tympanic)  Resp 18  Ht 5' 1\" (1.549 m)  Wt 214 lb 8 oz (97.3 kg)  SpO2 99%  BMI 40.53 kg/m2  Gen: AAOx4, NAD WN/WD ambulating without assistance, smiling, joking  Abd: Soft, ND/NT no rebound, no guarding  Wound: clean, dry, intact, no erythema, necrosis or drainage worrisome for infection    A/P  #1 S/P laparoscopic incisional hernia repair  #2 S/P port removal    Mrs. Gan looks pretty good. She's not having any issues.  Pain has improved, energy is increasing.  We are still very early in the recovery phase.  She's been instructed to refrain from lifting greater than 10 lbs (or a gallon of milk) for another three weeks and to avoid strenuous activity as well. I'll see her back in a week or two  "

## 2018-12-21 ENCOUNTER — OFFICE VISIT (OUTPATIENT)
Dept: SURGERY | Facility: OTHER | Age: 68
End: 2018-12-21
Attending: SURGERY
Payer: MEDICARE

## 2018-12-21 VITALS
DIASTOLIC BLOOD PRESSURE: 62 MMHG | HEART RATE: 72 BPM | HEIGHT: 61 IN | WEIGHT: 216 LBS | BODY MASS INDEX: 40.78 KG/M2 | OXYGEN SATURATION: 97 % | SYSTOLIC BLOOD PRESSURE: 120 MMHG | TEMPERATURE: 97.5 F

## 2018-12-21 DIAGNOSIS — Z87.19 S/P LAPAROSCOPIC HERNIA REPAIR: Primary | ICD-10-CM

## 2018-12-21 DIAGNOSIS — Z98.890 S/P LAPAROSCOPIC HERNIA REPAIR: Primary | ICD-10-CM

## 2018-12-21 PROCEDURE — G0463 HOSPITAL OUTPT CLINIC VISIT: HCPCS

## 2018-12-21 PROCEDURE — 99024 POSTOP FOLLOW-UP VISIT: CPT | Performed by: SURGERY

## 2018-12-21 ASSESSMENT — MIFFLIN-ST. JEOR: SCORE: 1462.15

## 2018-12-21 ASSESSMENT — PAIN SCALES - GENERAL: PAINLEVEL: NO PAIN (0)

## 2018-12-21 NOTE — PROGRESS NOTES
"S: Sola Gan returns three weeks after lap ventral hernia for incisional hernia.  Doing well post operatively, tolerating a general diet, having regular bowel movements, passing flatus, no diarrhea, doesn't take pain narcotics regularly.  Specifically denies fevers, chills, nausea, vomiting, shortness of breath. Major complaint is that she tires easily.    O:  /62 (BP Location: Right arm, Patient Position: Chair, Cuff Size: Adult Regular)   Pulse 72   Temp 97.5  F (36.4  C) (Tympanic)   Ht 1.549 m (5' 1\")   Wt 98 kg (216 lb)   SpO2 97%   BMI 40.81 kg/m    Gen: AAOx4, NAD WN/WD ambulating without assistance, smiling, joking  Abd: Soft, ND/NT no rebound, no guarding  Wound: clean, dry, intact, no erythema, necrosis or drainage worrisome for infection    A/P  #1 S/P laparoscopic incisional hernia repair    Mrs. Gan looks wonderful.  She's made a satisfactory recovery from surgery.  I do not need to see her again unless she has any issues in regards to this surgery.  I've provided my card with office phone number and told her not to hesitate to call with any questions, comments or concerns.  I'd like a call if she has any fevers over 101.3, nausea/vomiting, pain out of control.  She's been instructed to refrain from lifting greater than 10 lbs (or a gallon of milk) for another one week and to avoid strenuous activity as well. She was given a letter that she can return to work without restrictions on 12/27/18 but should work 1/2 days for 1 week.  If no issues, then can work full days.   "

## 2018-12-21 NOTE — PATIENT INSTRUCTIONS
Thank you for allowing Dr. Coreas and our surgical team to participate in your care. Please call with any scheduling questions or concerns to our health unit coordinator at [phone] or for nursing questions to the nurse at [phone].

## 2018-12-21 NOTE — LETTER
[facility] [place]  [address]  [place] [address]  [phone]          December 21, 2018    RE:  Sola Gan                                                                                                                                                       [address]   [facility] [id number]            To whom it may concern:    Mrs. Gan is under my professional care for surgical issue She  may return to work on 12/27/18 without any working restriction other than first week 1/2 day, then if no issues can do full day.     Sincerely,        Rigo Coreas DO, FACS

## 2018-12-21 NOTE — NURSING NOTE
"Chief Complaint   Patient presents with     Surgical Followup     s/p hernia repair       Initial /62 (BP Location: Right arm, Patient Position: Chair, Cuff Size: Adult Regular)   Pulse 72   Temp 97.5  F (36.4  C) (Tympanic)   Ht 1.549 m (5' 1\")   Wt 98 kg (216 lb)   SpO2 97%   BMI 40.81 kg/m   Estimated body mass index is 40.81 kg/m  as calculated from the following:    Height as of this encounter: 1.549 m (5' 1\").    Weight as of this encounter: 98 kg (216 lb).  Medication Reconciliation: complete    Aura Noriega LPN    "

## 2018-12-23 DIAGNOSIS — E11.9 CONTROLLED TYPE 2 DIABETES MELLITUS WITHOUT COMPLICATION, WITHOUT LONG-TERM CURRENT USE OF INSULIN (H): ICD-10-CM

## 2018-12-26 RX ORDER — ATORVASTATIN CALCIUM 40 MG/1
TABLET, FILM COATED ORAL
Qty: 90 TABLET | Refills: 1 | Status: SHIPPED | OUTPATIENT
Start: 2018-12-26 | End: 2019-06-02

## 2018-12-26 NOTE — TELEPHONE ENCOUNTER
atorvastatin (LIPITOR) 40 MG tablet  Last Written Prescription Date:  9/27/18  Last Fill Quantity: 90,   # refills: 0  Last Office Visit: 11/13/18  Future Office visit:

## 2019-01-03 DIAGNOSIS — E11.65 TYPE 2 DIABETES MELLITUS WITH HYPERGLYCEMIA, WITHOUT LONG-TERM CURRENT USE OF INSULIN (H): ICD-10-CM

## 2019-01-03 NOTE — TELEPHONE ENCOUNTER
Accu-Chek Elda Plus Strips 100's      Last Written Prescription Date:  2/20/18  Last Fill Quantity: 100,   # refills: 10  Last Office Visit: 11/13/18  Future Office visit:

## 2019-06-02 DIAGNOSIS — F32.89 OTHER DEPRESSION: ICD-10-CM

## 2019-06-02 DIAGNOSIS — E11.9 CONTROLLED TYPE 2 DIABETES MELLITUS WITHOUT COMPLICATION, WITHOUT LONG-TERM CURRENT USE OF INSULIN (H): ICD-10-CM

## 2019-06-04 RX ORDER — ATORVASTATIN CALCIUM 40 MG/1
TABLET, FILM COATED ORAL
Qty: 90 TABLET | Refills: 0 | Status: SHIPPED | OUTPATIENT
Start: 2019-06-04

## 2019-11-12 ENCOUNTER — TELEPHONE (OUTPATIENT)
Dept: FAMILY MEDICINE | Facility: OTHER | Age: 69
End: 2019-11-12

## 2019-11-12 NOTE — TELEPHONE ENCOUNTER
Called pt to remind her that she is due for annual recheck of thyroid US.  Pt states she is now living in [place] and will have this rechecked there.

## 2020-02-10 DIAGNOSIS — R10.13 ABDOMINAL PAIN, EPIGASTRIC: ICD-10-CM

## 2020-02-12 RX ORDER — PANTOPRAZOLE SODIUM 40 MG/1
TABLET, DELAYED RELEASE ORAL
Qty: 60 TABLET | Refills: 9 | Status: SHIPPED | OUTPATIENT
Start: 2020-02-12

## 2020-02-12 NOTE — TELEPHONE ENCOUNTER
pantoprazole (PROTONIX) 40 MG EC tablet      Last Written Prescription Date:  11/7/18  Last Fill Quantity: 60,   # refills: 9  Last Office Visit: 11/13/18  Future Office visit:       Routing refill request to provider for review/approval because:

## 2020-03-02 ENCOUNTER — HEALTH MAINTENANCE LETTER (OUTPATIENT)
Age: 70
End: 2020-03-02

## 2020-12-20 ENCOUNTER — HEALTH MAINTENANCE LETTER (OUTPATIENT)
Age: 70
End: 2020-12-20

## 2021-01-15 ENCOUNTER — HEALTH MAINTENANCE LETTER (OUTPATIENT)
Age: 71
End: 2021-01-15

## 2021-04-18 ENCOUNTER — HEALTH MAINTENANCE LETTER (OUTPATIENT)
Age: 71
End: 2021-04-18

## 2021-08-08 ENCOUNTER — HEALTH MAINTENANCE LETTER (OUTPATIENT)
Age: 71
End: 2021-08-08

## 2021-10-03 ENCOUNTER — HEALTH MAINTENANCE LETTER (OUTPATIENT)
Age: 71
End: 2021-10-03

## 2022-03-20 ENCOUNTER — HEALTH MAINTENANCE LETTER (OUTPATIENT)
Age: 72
End: 2022-03-20

## 2022-05-14 ENCOUNTER — HEALTH MAINTENANCE LETTER (OUTPATIENT)
Age: 72
End: 2022-05-14

## 2022-09-04 ENCOUNTER — HEALTH MAINTENANCE LETTER (OUTPATIENT)
Age: 72
End: 2022-09-04

## 2023-01-21 ENCOUNTER — HEALTH MAINTENANCE LETTER (OUTPATIENT)
Age: 73
End: 2023-01-21

## 2023-06-03 ENCOUNTER — HEALTH MAINTENANCE LETTER (OUTPATIENT)
Age: 73
End: 2023-06-03

## 2023-07-23 ENCOUNTER — HEALTH MAINTENANCE LETTER (OUTPATIENT)
Age: 73
End: 2023-07-23

## 2024-02-18 ENCOUNTER — HEALTH MAINTENANCE LETTER (OUTPATIENT)
Age: 74
End: 2024-02-18

## 2024-03-19 ENCOUNTER — APPOINTMENT (RX ONLY)
Dept: URBAN - NONMETROPOLITAN AREA CLINIC 10 | Facility: CLINIC | Age: 74
Setting detail: DERMATOLOGY
End: 2024-03-19

## 2024-03-19 DIAGNOSIS — L21.8 OTHER SEBORRHEIC DERMATITIS: ICD-10-CM

## 2024-03-19 DIAGNOSIS — D17 BENIGN LIPOMATOUS NEOPLASM: ICD-10-CM

## 2024-03-19 PROBLEM — D17.22 BENIGN LIPOMATOUS NEOPLASM OF SKIN AND SUBCUTANEOUS TISSUE OF LEFT ARM: Status: ACTIVE | Noted: 2024-03-19

## 2024-03-19 PROCEDURE — 99203 OFFICE O/P NEW LOW 30 MIN: CPT

## 2024-03-19 PROCEDURE — ? PRESCRIPTION

## 2024-03-19 PROCEDURE — ? DEFER

## 2024-03-19 PROCEDURE — ? PRESCRIPTION MEDICATION MANAGEMENT

## 2024-03-19 PROCEDURE — ? COUNSELING

## 2024-03-19 RX ORDER — KETOCONAZOLE 20 MG/ML
1 SHAMPOO, SUSPENSION TOPICAL
Qty: 120 | Refills: 5 | Status: ERX | COMMUNITY
Start: 2024-03-19

## 2024-03-19 RX ADMIN — KETOCONAZOLE 1: 20 SHAMPOO, SUSPENSION TOPICAL at 00:00

## 2024-03-19 ASSESSMENT — LOCATION ZONE DERM
LOCATION ZONE: SCALP
LOCATION ZONE: ARM

## 2024-03-19 ASSESSMENT — LOCATION SIMPLE DESCRIPTION DERM
LOCATION SIMPLE: SCALP
LOCATION SIMPLE: LEFT FOREARM

## 2024-03-19 ASSESSMENT — LOCATION DETAILED DESCRIPTION DERM
LOCATION DETAILED: LEFT SUPERIOR PARIETAL SCALP
LOCATION DETAILED: LEFT PROXIMAL RADIAL DORSAL FOREARM

## 2024-03-19 NOTE — PROCEDURE: PRESCRIPTION MEDICATION MANAGEMENT
Render In Strict Bullet Format?: No
Detail Level: Zone
Initiate Treatment: ketoconazole 2 % shampoo 2 times per week\\nQuantity: 120.0 ml  Days Supply: 30\\nSig: Lather and apply to scalp for 3-5 minutes, then rinse. Use 3-4 times a week as needed for dandruff condition

## 2024-04-02 ENCOUNTER — APPOINTMENT (RX ONLY)
Dept: URBAN - NONMETROPOLITAN AREA CLINIC 10 | Facility: CLINIC | Age: 74
Setting detail: DERMATOLOGY
End: 2024-04-02

## 2024-04-02 DIAGNOSIS — D17 BENIGN LIPOMATOUS NEOPLASM: ICD-10-CM

## 2024-04-02 PROBLEM — D17.22 BENIGN LIPOMATOUS NEOPLASM OF SKIN AND SUBCUTANEOUS TISSUE OF LEFT ARM: Status: ACTIVE | Noted: 2024-04-02

## 2024-04-02 PROCEDURE — ? EXCISION

## 2024-04-02 PROCEDURE — 11403 EXC TR-EXT B9+MARG 2.1-3CM: CPT

## 2024-04-02 PROCEDURE — 12032 INTMD RPR S/A/T/EXT 2.6-7.5: CPT

## 2024-04-02 ASSESSMENT — LOCATION DETAILED DESCRIPTION DERM: LOCATION DETAILED: LEFT PROXIMAL ULNAR DORSAL FOREARM

## 2024-04-02 ASSESSMENT — LOCATION ZONE DERM: LOCATION ZONE: ARM

## 2024-04-02 ASSESSMENT — LOCATION SIMPLE DESCRIPTION DERM: LOCATION SIMPLE: LEFT FOREARM

## 2024-04-02 NOTE — PROCEDURE: EXCISION
Medical Necessity Information: It is in your best interest to select a reason for this procedure from the list below. All of these items fulfill various CMS LCD requirements except lesion extends to a margin.
Include Z78.9 (Other Specified Conditions Influencing Health Status) As An Associated Diagnosis?: No
Add Nub Associated Diagnoses If Applicable When Selecting Medical Necessity: Yes
Medical Necessity Clause: This procedure was medically necessary because the lesion that was treated was:
Lab: [id number]
Lab Facility: [id number]
Size Of Lesion In Cm: 2.5
X Size Of Lesion In Cm (Optional): 0
Anesthesia Volume In Cc: 3
Eye Clamp Note Details: An eye clamp was used during the procedure.
Excision Method: Elliptical
Saucerization Depth: dermis and superficial adipose tissue
Repair Type: Intermediate
Suturegard Retention Suture: 2-0 Nylon
Retention Suture Bite Size: 3 mm
Length To Time In Minutes Device Was In Place: 10
Number Of Hemigard Strips Per Side: 1
Undermining Type: Entire Wound
Debridement Text: The wound edges were debrided prior to proceeding with the closure to facilitate wound healing.
Helical Rim Text: The closure involved the helical rim.
Vermilion Border Text: The closure involved the vermilion border.
Nostril Rim Text: The closure involved the nostril rim.
Retention Suture Text: Retention sutures were placed to support the closure and prevent dehiscence.
Epidermal Closure Graft Donor Site (Optional): simple interrupted
Graft Donor Site Bandage (Optional-Leave Blank If You Don't Want In Note): Steri-strips and a pressure bandage were applied to the donor site.
Detail Level: Detailed
Excision Depth: adipose tissue
Scalpel Size: 15 blade
Anesthesia Type: 1% lidocaine with epinephrine and a 1:10 solution of 8.4% sodium bicarbonate
Hemostasis: Pressure
Estimated Blood Loss (Cc): minimal
Anesthesia Type: 1% lidocaine with epinephrine
Deep Sutures: 4-0 Vicryl
Epidermal Sutures: 5-0 Fast Absorbing Gut
Epidermal Closure: running
Wound Care: Vaseline
Dressing: pressure dressing with telfa
Suturegard Intro: Intraoperative tissue expansion was performed, utilizing the SUTUREGARD device, in order to reduce wound tension.
Suturegard Body: The suture ends were repeatedly re-tightened and re-clamped to achieve the desired tissue expansion.
Hemigard Intro: Due to skin fragility and wound tension, it was decided to use HEMIGARD adhesive retention suture devices to permit a linear closure. The skin was cleaned and dried for a 6cm distance away from the wound. Excessive hair, if present, was removed to allow for adhesion.
Hemigard Postcare Instructions: The HEMIGARD strips are to remain completely dry for at least 5-7 days.
Positioning (Leave Blank If You Do Not Want): The patient was placed in a comfortable position exposing the surgical site.
Complex Repair Preamble Text (Leave Blank If You Do Not Want): Extensive wide undermining was performed.
Intermediate Repair Preamble Text (Leave Blank If You Do Not Want): Undermining was performed with blunt dissection.
Fusiform Excision Additional Text (Leave Blank If You Do Not Want): The margin was drawn around the clinically apparent lesion.  A fusiform shape was then drawn on the skin incorporating the lesion and margins.  Incisions were then made along these lines to the appropriate tissue plane and the lesion was extirpated.
Eliptical Excision Additional Text (Leave Blank If You Do Not Want): The margin was drawn around the clinically apparent lesion.  An elliptical shape was then drawn on the skin incorporating the lesion and margins.  Incisions were then made along these lines to the appropriate tissue plane and the lesion was extirpated.
Saucerization Excision Additional Text (Leave Blank If You Do Not Want): The margin was drawn around the clinically apparent lesion.  Incisions were then made along these lines, in a tangential fashion, to the appropriate tissue plane and the lesion was extirpated.
Slit Excision Additional Text (Leave Blank If You Do Not Want): A linear line was drawn on the skin overlying the lesion. An incision was made slowly until the lesion was visualized.  Once visualized, the lesion was removed with blunt dissection.
Excisional Biopsy Additional Text (Leave Blank If You Do Not Want): The margin was drawn around the clinically apparent lesion. An elliptical shape was then drawn on the skin incorporating the lesion and margins.  Incisions were then made along these lines to the appropriate tissue plane and the lesion was extirpated.
Perilesional Excision Additional Text (Leave Blank If You Do Not Want): The margin was drawn around the clinically apparent lesion. Incisions were then made along these lines to the appropriate tissue plane and the lesion was extirpated.
Repair Performed By Another Provider Text (Leave Blank If You Do Not Want): After the tissue was excised the defect was repaired by another provider.
No Repair - Repaired With Adjacent Surgical Defect Text (Leave Blank If You Do Not Want): After the excision the defect was repaired concurrently with another surgical defect which was in close approximation.
Adjacent Tissue Transfer Text: The defect edges were debeveled with a #15 scalpel blade.  Given the location of the defect and the proximity to free margins an adjacent tissue transfer was deemed most appropriate.  Using a sterile surgical marker, an appropriate flap was drawn incorporating the defect and placing the expected incisions within the relaxed skin tension lines where possible.    The area thus outlined was incised deep to adipose tissue with a #15 scalpel blade.  The skin margins were undermined to an appropriate distance in all directions utilizing iris scissors.
Advancement Flap (Single) Text: The defect edges were debeveled with a #15 scalpel blade.  Given the location of the defect and the proximity to free margins a single advancement flap was deemed most appropriate.  Using a sterile surgical marker, an appropriate advancement flap was drawn incorporating the defect and placing the expected incisions within the relaxed skin tension lines where possible.    The area thus outlined was incised deep to adipose tissue with a #15 scalpel blade.  The skin margins were undermined to an appropriate distance in all directions utilizing iris scissors.
Advancement Flap (Double) Text: The defect edges were debeveled with a #15 scalpel blade.  Given the location of the defect and the proximity to free margins a double advancement flap was deemed most appropriate.  Using a sterile surgical marker, the appropriate advancement flaps were drawn incorporating the defect and placing the expected incisions within the relaxed skin tension lines where possible.    The area thus outlined was incised deep to adipose tissue with a #15 scalpel blade.  The skin margins were undermined to an appropriate distance in all directions utilizing iris scissors.
Burow's Advancement Flap Text: The defect edges were debeveled with a #15 scalpel blade.  Given the location of the defect and the proximity to free margins a Burow's advancement flap was deemed most appropriate.  Using a sterile surgical marker, the appropriate advancement flap was drawn incorporating the defect and placing the expected incisions within the relaxed skin tension lines where possible.    The area thus outlined was incised deep to adipose tissue with a #15 scalpel blade.  The skin margins were undermined to an appropriate distance in all directions utilizing iris scissors.
Chonodrocutaneous Helical Advancement Flap Text: The defect edges were debeveled with a #15 scalpel blade.  Given the location of the defect and the proximity to free margins a chondrocutaneous helical advancement flap was deemed most appropriate.  Using a sterile surgical marker, the appropriate advancement flap was drawn incorporating the defect and placing the expected incisions within the relaxed skin tension lines where possible.    The area thus outlined was incised deep to adipose tissue with a #15 scalpel blade.  The skin margins were undermined to an appropriate distance in all directions utilizing iris scissors.
Crescentic Advancement Flap Text: The defect edges were debeveled with a #15 scalpel blade.  Given the location of the defect and the proximity to free margins a crescentic advancement flap was deemed most appropriate.  Using a sterile surgical marker, the appropriate advancement flap was drawn incorporating the defect and placing the expected incisions within the relaxed skin tension lines where possible.    The area thus outlined was incised deep to adipose tissue with a #15 scalpel blade.  The skin margins were undermined to an appropriate distance in all directions utilizing iris scissors.
A-T Advancement Flap Text: The defect edges were debeveled with a #15 scalpel blade.  Given the location of the defect, shape of the defect and the proximity to free margins an A-T advancement flap was deemed most appropriate.  Using a sterile surgical marker, an appropriate advancement flap was drawn incorporating the defect and placing the expected incisions within the relaxed skin tension lines where possible.    The area thus outlined was incised deep to adipose tissue with a #15 scalpel blade.  The skin margins were undermined to an appropriate distance in all directions utilizing iris scissors.
O-T Advancement Flap Text: The defect edges were debeveled with a #15 scalpel blade.  Given the location of the defect, shape of the defect and the proximity to free margins an O-T advancement flap was deemed most appropriate.  Using a sterile surgical marker, an appropriate advancement flap was drawn incorporating the defect and placing the expected incisions within the relaxed skin tension lines where possible.    The area thus outlined was incised deep to adipose tissue with a #15 scalpel blade.  The skin margins were undermined to an appropriate distance in all directions utilizing iris scissors.
O-L Flap Text: The defect edges were debeveled with a #15 scalpel blade.  Given the location of the defect, shape of the defect and the proximity to free margins an O-L flap was deemed most appropriate.  Using a sterile surgical marker, an appropriate advancement flap was drawn incorporating the defect and placing the expected incisions within the relaxed skin tension lines where possible.    The area thus outlined was incised deep to adipose tissue with a #15 scalpel blade.  The skin margins were undermined to an appropriate distance in all directions utilizing iris scissors.
O-Z Flap Text: The defect edges were debeveled with a #15 scalpel blade.  Given the location of the defect, shape of the defect and the proximity to free margins an O-Z flap was deemed most appropriate.  Using a sterile surgical marker, an appropriate transposition flap was drawn incorporating the defect and placing the expected incisions within the relaxed skin tension lines where possible. The area thus outlined was incised deep to adipose tissue with a #15 scalpel blade.  The skin margins were undermined to an appropriate distance in all directions utilizing iris scissors.
Double O-Z Flap Text: The defect edges were debeveled with a #15 scalpel blade.  Given the location of the defect, shape of the defect and the proximity to free margins a Double O-Z flap was deemed most appropriate.  Using a sterile surgical marker, an appropriate transposition flap was drawn incorporating the defect and placing the expected incisions within the relaxed skin tension lines where possible. The area thus outlined was incised deep to adipose tissue with a #15 scalpel blade.  The skin margins were undermined to an appropriate distance in all directions utilizing iris scissors.
V-Y Flap Text: The defect edges were debeveled with a #15 scalpel blade.  Given the location of the defect, shape of the defect and the proximity to free margins a V-Y flap was deemed most appropriate.  Using a sterile surgical marker, an appropriate advancement flap was drawn incorporating the defect and placing the expected incisions within the relaxed skin tension lines where possible.    The area thus outlined was incised deep to adipose tissue with a #15 scalpel blade.  The skin margins were undermined to an appropriate distance in all directions utilizing iris scissors.
Advancement-Rotation Flap Text: The defect edges were debeveled with a #15 scalpel blade.  Given the location of the defect, shape of the defect and the proximity to free margins an advancement-rotation flap was deemed most appropriate.  Using a sterile surgical marker, an appropriate flap was drawn incorporating the defect and placing the expected incisions within the relaxed skin tension lines where possible. The area thus outlined was incised deep to adipose tissue with a #15 scalpel blade.  The skin margins were undermined to an appropriate distance in all directions utilizing iris scissors.
Mercedes Flap Text: The defect edges were debeveled with a #15 scalpel blade.  Given the location of the defect, shape of the defect and the proximity to free margins a Mercedes flap was deemed most appropriate.  Using a sterile surgical marker, an appropriate advancement flap was drawn incorporating the defect and placing the expected incisions within the relaxed skin tension lines where possible. The area thus outlined was incised deep to adipose tissue with a #15 scalpel blade.  The skin margins were undermined to an appropriate distance in all directions utilizing iris scissors.
Modified Advancement Flap Text: The defect edges were debeveled with a #15 scalpel blade.  Given the location of the defect, shape of the defect and the proximity to free margins a modified advancement flap was deemed most appropriate.  Using a sterile surgical marker, an appropriate advancement flap was drawn incorporating the defect and placing the expected incisions within the relaxed skin tension lines where possible.    The area thus outlined was incised deep to adipose tissue with a #15 scalpel blade.  The skin margins were undermined to an appropriate distance in all directions utilizing iris scissors.
Mucosal Advancement Flap Text: Given the location of the defect, shape of the defect and the proximity to free margins a mucosal advancement flap was deemed most appropriate. Incisions were made with a 15 blade scalpel in the appropriate fashion along the cutaneous vermillion border and the mucosal lip. The remaining actinically damaged mucosal tissue was excised.  The mucosal advancement flap was then elevated to the gingival sulcus with care taken to preserve the neurovascular structures and advanced into the primary defect. Care was taken to ensure that precise realignment of the vermillion border was achieved.
Peng Advancement Flap Text: The defect edges were debeveled with a #15 scalpel blade.  Given the location of the defect, shape of the defect and the proximity to free margins a Peng advancement flap was deemed most appropriate.  Using a sterile surgical marker, an appropriate advancement flap was drawn incorporating the defect and placing the expected incisions within the relaxed skin tension lines where possible. The area thus outlined was incised deep to adipose tissue with a #15 scalpel blade.  The skin margins were undermined to an appropriate distance in all directions utilizing iris scissors.
Hatchet Flap Text: The defect edges were debeveled with a #15 scalpel blade.  Given the location of the defect, shape of the defect and the proximity to free margins a hatchet flap was deemed most appropriate.  Using a sterile surgical marker, an appropriate hatchet flap was drawn incorporating the defect and placing the expected incisions within the relaxed skin tension lines where possible.    The area thus outlined was incised deep to adipose tissue with a #15 scalpel blade.  The skin margins were undermined to an appropriate distance in all directions utilizing iris scissors.
Rotation Flap Text: The defect edges were debeveled with a #15 scalpel blade.  Given the location of the defect, shape of the defect and the proximity to free margins a rotation flap was deemed most appropriate.  Using a sterile surgical marker, an appropriate rotation flap was drawn incorporating the defect and placing the expected incisions within the relaxed skin tension lines where possible.    The area thus outlined was incised deep to adipose tissue with a #15 scalpel blade.  The skin margins were undermined to an appropriate distance in all directions utilizing iris scissors.
Bilateral Rotation Flap Text: The defect edges were debeveled with a #15 scalpel blade. Given the location of the defect, shape of the defect and the proximity to free margins a bilateral rotation flap was deemed most appropriate. Using a sterile surgical marker, an appropriate rotation flap was drawn incorporating the defect and placing the expected incisions within the relaxed skin tension lines where possible. The area thus outlined was incised deep to adipose tissue with a #15 scalpel blade. The skin margins were undermined to an appropriate distance in all directions utilizing iris scissors. Following this, the designed flap was carried over into the primary defect and sutured into place.
Spiral Flap Text: The defect edges were debeveled with a #15 scalpel blade.  Given the location of the defect, shape of the defect and the proximity to free margins a spiral flap was deemed most appropriate.  Using a sterile surgical marker, an appropriate rotation flap was drawn incorporating the defect and placing the expected incisions within the relaxed skin tension lines where possible. The area thus outlined was incised deep to adipose tissue with a #15 scalpel blade.  The skin margins were undermined to an appropriate distance in all directions utilizing iris scissors.
Staged Advancement Flap Text: The defect edges were debeveled with a #15 scalpel blade.  Given the location of the defect, shape of the defect and the proximity to free margins a staged advancement flap was deemed most appropriate.  Using a sterile surgical marker, an appropriate advancement flap was drawn incorporating the defect and placing the expected incisions within the relaxed skin tension lines where possible. The area thus outlined was incised deep to adipose tissue with a #15 scalpel blade.  The skin margins were undermined to an appropriate distance in all directions utilizing iris scissors.
Star Wedge Flap Text: The defect edges were debeveled with a #15 scalpel blade.  Given the location of the defect, shape of the defect and the proximity to free margins a star wedge flap was deemed most appropriate.  Using a sterile surgical marker, an appropriate rotation flap was drawn incorporating the defect and placing the expected incisions within the relaxed skin tension lines where possible. The area thus outlined was incised deep to adipose tissue with a #15 scalpel blade.  The skin margins were undermined to an appropriate distance in all directions utilizing iris scissors.
Transposition Flap Text: The defect edges were debeveled with a #15 scalpel blade.  Given the location of the defect and the proximity to free margins a transposition flap was deemed most appropriate.  Using a sterile surgical marker, an appropriate transposition flap was drawn incorporating the defect.    The area thus outlined was incised deep to adipose tissue with a #15 scalpel blade.  The skin margins were undermined to an appropriate distance in all directions utilizing iris scissors.
Muscle Hinge Flap Text: The defect edges were debeveled with a #15 scalpel blade.  Given the size, depth and location of the defect and the proximity to free margins a muscle hinge flap was deemed most appropriate.  Using a sterile surgical marker, an appropriate hinge flap was drawn incorporating the defect. The area thus outlined was incised with a #15 scalpel blade.  The skin margins were undermined to an appropriate distance in all directions utilizing iris scissors.
Mustarde Flap Text: The defect edges were debeveled with a #15 scalpel blade.  Given the size, depth and location of the defect and the proximity to free margins a Mustarde flap was deemed most appropriate.  Using a sterile surgical marker, an appropriate flap was drawn incorporating the defect. The area thus outlined was incised with a #15 scalpel blade.  The skin margins were undermined to an appropriate distance in all directions utilizing iris scissors.
Nasal Turnover Hinge Flap Text: The defect edges were debeveled with a #15 scalpel blade.  Given the size, depth, location of the defect and the defect being full thickness a nasal turnover hinge flap was deemed most appropriate.  Using a sterile surgical marker, an appropriate hinge flap was drawn incorporating the defect. The area thus outlined was incised with a #15 scalpel blade. The flap was designed to recreate the nasal mucosal lining and the alar rim. The skin margins were undermined to an appropriate distance in all directions utilizing iris scissors.
Nasalis-Muscle-Based Myocutaneous Island Pedicle Flap Text: Using a #15 blade, an incision was made around the donor flap to the level of the nasalis muscle. Wide lateral undermining was then performed in both the subcutaneous plane above the nasalis muscle, and in a submuscular plane just above periosteum. This allowed the formation of a free nasalis muscle axial pedicle (based on the angular artery) which was still attached to the actual cutaneous flap, increasing its mobility and vascular viability. Hemostasis was obtained with pinpoint electrocoagulation. The flap was mobilized into position and the pivotal anchor points positioned and stabilized with buried interrupted sutures. Subcutaneous and dermal tissues were closed in a multilayered fashion with sutures. Tissue redundancies were excised, and the epidermal edges were apposed without significant tension and sutured with sutures.
Nasalis Myocutaneous Flap Text: Using a #15 blade, an incision was made around the donor flap to the level of the nasalis muscle. Wide lateral undermining was then performed in both the subcutaneous plane above the nasalis muscle, and in a submuscular plane just above periosteum. This allowed the formation of a free nasalis muscle axial pedicle which was still attached to the actual cutaneous flap, increasing its mobility and vascular viability. Hemostasis was obtained with pinpoint electrocoagulation. The flap was mobilized into position and the pivotal anchor points positioned and stabilized with buried interrupted sutures. Subcutaneous and dermal tissues were closed in a multilayered fashion with sutures. Tissue redundancies were excised, and the epidermal edges were apposed without significant tension and sutured with sutures.
Orbicularis Oris Muscle Flap Text: The defect edges were debeveled with a #15 scalpel blade.  Given that the defect affected the competency of the oral sphincter an orbicularis oris muscle flap was deemed most appropriate to restore this competency and normal muscle function.  Using a sterile surgical marker, an appropriate flap was drawn incorporating the defect. The area thus outlined was incised with a #15 scalpel blade.
Melolabial Transposition Flap Text: The defect edges were debeveled with a #15 scalpel blade.  Given the location of the defect and the proximity to free margins a melolabial flap was deemed most appropriate.  Using a sterile surgical marker, an appropriate melolabial transposition flap was drawn incorporating the defect.    The area thus outlined was incised deep to adipose tissue with a #15 scalpel blade.  The skin margins were undermined to an appropriate distance in all directions utilizing iris scissors.
Rectangular Flap Text: The defect edges were debeveled with a #15 scalpel blade. Given the location of the defect and the proximity to free margins a rectangular flap was deemed most appropriate. Using a sterile surgical marker, an appropriate rectangular flap was drawn incorporating the defect. The area thus outlined was incised deep to adipose tissue with a #15 scalpel blade. The skin margins were undermined to an appropriate distance in all directions utilizing iris scissors. Following this, the designed flap was carried over into the primary defect and sutured into place.
Rhombic Flap Text: The defect edges were debeveled with a #15 scalpel blade.  Given the location of the defect and the proximity to free margins a rhombic flap was deemed most appropriate.  Using a sterile surgical marker, an appropriate rhombic flap was drawn incorporating the defect.    The area thus outlined was incised deep to adipose tissue with a #15 scalpel blade.  The skin margins were undermined to an appropriate distance in all directions utilizing iris scissors.
Rhomboid Transposition Flap Text: The defect edges were debeveled with a #15 scalpel blade.  Given the location of the defect and the proximity to free margins a rhomboid transposition flap was deemed most appropriate.  Using a sterile surgical marker, an appropriate rhomboid flap was drawn incorporating the defect.    The area thus outlined was incised deep to adipose tissue with a #15 scalpel blade.  The skin margins were undermined to an appropriate distance in all directions utilizing iris scissors.
Bi-Rhombic Flap Text: The defect edges were debeveled with a #15 scalpel blade.  Given the location of the defect and the proximity to free margins a bi-rhombic flap was deemed most appropriate.  Using a sterile surgical marker, an appropriate rhombic flap was drawn incorporating the defect. The area thus outlined was incised deep to adipose tissue with a #15 scalpel blade.  The skin margins were undermined to an appropriate distance in all directions utilizing iris scissors.
Helical Rim Advancement Flap Text: The defect edges were debeveled with a #15 blade scalpel.  Given the location of the defect and the proximity to free margins (helical rim) a double helical rim advancement flap was deemed most appropriate.  Using a sterile surgical marker, the appropriate advancement flaps were drawn incorporating the defect and placing the expected incisions between the helical rim and antihelix where possible.  The area thus outlined was incised through and through with a #15 scalpel blade.  With a skin hook and iris scissors, the flaps were gently and sharply undermined and freed up.
Bilateral Helical Rim Advancement Flap Text: The defect edges were debeveled with a #15 blade scalpel.  Given the location of the defect and the proximity to free margins (helical rim) a bilateral helical rim advancement flap was deemed most appropriate.  Using a sterile surgical marker, the appropriate advancement flaps were drawn incorporating the defect and placing the expected incisions between the helical rim and antihelix where possible.  The area thus outlined was incised through and through with a #15 scalpel blade.  With a skin hook and iris scissors, the flaps were gently and sharply undermined and freed up.
Ear Star Wedge Flap Text: The defect edges were debeveled with a #15 blade scalpel.  Given the location of the defect and the proximity to free margins (helical rim) an ear star wedge flap was deemed most appropriate.  Using a sterile surgical marker, the appropriate flap was drawn incorporating the defect and placing the expected incisions between the helical rim and antihelix where possible.  The area thus outlined was incised through and through with a #15 scalpel blade.
Banner Transposition Flap Text: The defect edges were debeveled with a #15 scalpel blade.  Given the location of the defect and the proximity to free margins a Banner transposition flap was deemed most appropriate.  Using a sterile surgical marker, an appropriate flap drawn around the defect. The area thus outlined was incised deep to adipose tissue with a #15 scalpel blade.  The skin margins were undermined to an appropriate distance in all directions utilizing iris scissors.
Bilobed Flap Text: The defect edges were debeveled with a #15 scalpel blade.  Given the location of the defect and the proximity to free margins a bilobe flap was deemed most appropriate.  Using a sterile surgical marker, an appropriate bilobe flap drawn around the defect.    The area thus outlined was incised deep to adipose tissue with a #15 scalpel blade.  The skin margins were undermined to an appropriate distance in all directions utilizing iris scissors.
Bilobed Transposition Flap Text: The defect edges were debeveled with a #15 scalpel blade.  Given the location of the defect and the proximity to free margins a bilobed transposition flap was deemed most appropriate.  Using a sterile surgical marker, an appropriate bilobe flap drawn around the defect.    The area thus outlined was incised deep to adipose tissue with a #15 scalpel blade.  The skin margins were undermined to an appropriate distance in all directions utilizing iris scissors.
Trilobed Flap Text: The defect edges were debeveled with a #15 scalpel blade.  Given the location of the defect and the proximity to free margins a trilobed flap was deemed most appropriate.  Using a sterile surgical marker, an appropriate trilobed flap drawn around the defect.    The area thus outlined was incised deep to adipose tissue with a #15 scalpel blade.  The skin margins were undermined to an appropriate distance in all directions utilizing iris scissors.
Dorsal Nasal Flap Text: The defect edges were debeveled with a #15 scalpel blade.  Given the location of the defect and the proximity to free margins a dorsal nasal flap was deemed most appropriate.  Using a sterile surgical marker, an appropriate dorsal nasal flap was drawn around the defect.    The area thus outlined was incised deep to adipose tissue with a #15 scalpel blade.  The skin margins were undermined to an appropriate distance in all directions utilizing iris scissors.
Island Pedicle Flap Text: The defect edges were debeveled with a #15 scalpel blade.  Given the location of the defect, shape of the defect and the proximity to free margins an island pedicle advancement flap was deemed most appropriate.  Using a sterile surgical marker, an appropriate advancement flap was drawn incorporating the defect, outlining the appropriate donor tissue and placing the expected incisions within the relaxed skin tension lines where possible.    The area thus outlined was incised deep to adipose tissue with a #15 scalpel blade.  The skin margins were undermined to an appropriate distance in all directions around the primary defect and laterally outward around the island pedicle utilizing iris scissors.  There was minimal undermining beneath the pedicle flap.
Island Pedicle Flap With Canthal Suspension Text: The defect edges were debeveled with a #15 scalpel blade.  Given the location of the defect, shape of the defect and the proximity to free margins an island pedicle advancement flap was deemed most appropriate.  Using a sterile surgical marker, an appropriate advancement flap was drawn incorporating the defect, outlining the appropriate donor tissue and placing the expected incisions within the relaxed skin tension lines where possible. The area thus outlined was incised deep to adipose tissue with a #15 scalpel blade.  The skin margins were undermined to an appropriate distance in all directions around the primary defect and laterally outward around the island pedicle utilizing iris scissors.  There was minimal undermining beneath the pedicle flap. A suspension suture was placed in the canthal tendon to prevent tension and prevent ectropion.
Alar Island Pedicle Flap Text: The defect edges were debeveled with a #15 scalpel blade.  Given the location of the defect, shape of the defect and the proximity to the alar rim an island pedicle advancement flap was deemed most appropriate.  Using a sterile surgical marker, an appropriate advancement flap was drawn incorporating the defect, outlining the appropriate donor tissue and placing the expected incisions within the nasal ala running parallel to the alar rim. The area thus outlined was incised with a #15 scalpel blade.  The skin margins were undermined minimally to an appropriate distance in all directions around the primary defect and laterally outward around the island pedicle utilizing iris scissors.  There was minimal undermining beneath the pedicle flap.
Double Island Pedicle Flap Text: The defect edges were debeveled with a #15 scalpel blade.  Given the location of the defect, shape of the defect and the proximity to free margins a double island pedicle advancement flap was deemed most appropriate.  Using a sterile surgical marker, an appropriate advancement flap was drawn incorporating the defect, outlining the appropriate donor tissue and placing the expected incisions within the relaxed skin tension lines where possible.    The area thus outlined was incised deep to adipose tissue with a #15 scalpel blade.  The skin margins were undermined to an appropriate distance in all directions around the primary defect and laterally outward around the island pedicle utilizing iris scissors.  There was minimal undermining beneath the pedicle flap.
Island Pedicle Flap-Requiring Vessel Identification Text: The defect edges were debeveled with a #15 scalpel blade.  Given the location of the defect, shape of the defect and the proximity to free margins an island pedicle advancement flap was deemed most appropriate.  Using a sterile surgical marker, an appropriate advancement flap was drawn, based on the axial vessel mentioned above, incorporating the defect, outlining the appropriate donor tissue and placing the expected incisions within the relaxed skin tension lines where possible.    The area thus outlined was incised deep to adipose tissue with a #15 scalpel blade.  The skin margins were undermined to an appropriate distance in all directions around the primary defect and laterally outward around the island pedicle utilizing iris scissors.  There was minimal undermining beneath the pedicle flap.
Keystone Flap Text: The defect edges were debeveled with a #15 scalpel blade.  Given the location of the defect, shape of the defect a keystone flap was deemed most appropriate.  Using a sterile surgical marker, an appropriate keystone flap was drawn incorporating the defect, outlining the appropriate donor tissue and placing the expected incisions within the relaxed skin tension lines where possible. The area thus outlined was incised deep to adipose tissue with a #15 scalpel blade.  The skin margins were undermined to an appropriate distance in all directions around the primary defect and laterally outward around the flap utilizing iris scissors.
O-T Plasty Text: The defect edges were debeveled with a #15 scalpel blade.  Given the location of the defect, shape of the defect and the proximity to free margins an O-T plasty was deemed most appropriate.  Using a sterile surgical marker, an appropriate O-T plasty was drawn incorporating the defect and placing the expected incisions within the relaxed skin tension lines where possible.    The area thus outlined was incised deep to adipose tissue with a #15 scalpel blade.  The skin margins were undermined to an appropriate distance in all directions utilizing iris scissors.
O-Z Plasty Text: The defect edges were debeveled with a #15 scalpel blade.  Given the location of the defect, shape of the defect and the proximity to free margins an O-Z plasty (double transposition flap) was deemed most appropriate.  Using a sterile surgical marker, the appropriate transposition flaps were drawn incorporating the defect and placing the expected incisions within the relaxed skin tension lines where possible.    The area thus outlined was incised deep to adipose tissue with a #15 scalpel blade.  The skin margins were undermined to an appropriate distance in all directions utilizing iris scissors.  Hemostasis was achieved with electrocautery.  The flaps were then transposed into place, one clockwise and the other counterclockwise, and anchored with interrupted buried subcutaneous sutures.
Double O-Z Plasty Text: The defect edges were debeveled with a #15 scalpel blade.  Given the location of the defect, shape of the defect and the proximity to free margins a Double O-Z plasty (double transposition flap) was deemed most appropriate.  Using a sterile surgical marker, the appropriate transposition flaps were drawn incorporating the defect and placing the expected incisions within the relaxed skin tension lines where possible. The area thus outlined was incised deep to adipose tissue with a #15 scalpel blade.  The skin margins were undermined to an appropriate distance in all directions utilizing iris scissors.  Hemostasis was achieved with electrocautery.  The flaps were then transposed into place, one clockwise and the other counterclockwise, and anchored with interrupted buried subcutaneous sutures.
V-Y Plasty Text: The defect edges were debeveled with a #15 scalpel blade.  Given the location of the defect, shape of the defect and the proximity to free margins an V-Y advancement flap was deemed most appropriate.  Using a sterile surgical marker, an appropriate advancement flap was drawn incorporating the defect and placing the expected incisions within the relaxed skin tension lines where possible.    The area thus outlined was incised deep to adipose tissue with a #15 scalpel blade.  The skin margins were undermined to an appropriate distance in all directions utilizing iris scissors.
H Plasty Text: Given the location of the defect, shape of the defect and the proximity to free margins a H-plasty was deemed most appropriate for repair.  Using a sterile surgical marker, the appropriate advancement arms of the H-plasty were drawn incorporating the defect and placing the expected incisions within the relaxed skin tension lines where possible. The area thus outlined was incised deep to adipose tissue with a #15 scalpel blade. The skin margins were undermined to an appropriate distance in all directions utilizing iris scissors.  The opposing advancement arms were then advanced into place in opposite direction and anchored with interrupted buried subcutaneous sutures.
W Plasty Text: The lesion was extirpated to the level of the fat with a #15 scalpel blade.  Given the location of the defect, shape of the defect and the proximity to free margins a W-plasty was deemed most appropriate for repair.  Using a sterile surgical marker, the appropriate transposition arms of the W-plasty were drawn incorporating the defect and placing the expected incisions within the relaxed skin tension lines where possible.    The area thus outlined was incised deep to adipose tissue with a #15 scalpel blade.  The skin margins were undermined to an appropriate distance in all directions utilizing iris scissors.  The opposing transposition arms were then transposed into place in opposite direction and anchored with interrupted buried subcutaneous sutures.
Z Plasty Text: The lesion was extirpated to the level of the fat with a #15 scalpel blade.  Given the location of the defect, shape of the defect and the proximity to free margins a Z-plasty was deemed most appropriate for repair.  Using a sterile surgical marker, the appropriate transposition arms of the Z-plasty were drawn incorporating the defect and placing the expected incisions within the relaxed skin tension lines where possible.    The area thus outlined was incised deep to adipose tissue with a #15 scalpel blade.  The skin margins were undermined to an appropriate distance in all directions utilizing iris scissors.  The opposing transposition arms were then transposed into place in opposite direction and anchored with interrupted buried subcutaneous sutures.
Double Z Plasty Text: The lesion was extirpated to the level of the fat with a #15 scalpel blade. Given the location of the defect, shape of the defect and the proximity to free margins a double Z-plasty was deemed most appropriate for repair. Using a sterile surgical marker, the appropriate transposition arms of the double Z-plasty were drawn incorporating the defect and placing the expected incisions within the relaxed skin tension lines where possible. The area thus outlined was incised deep to adipose tissue with a #15 scalpel blade. The skin margins were undermined to an appropriate distance in all directions utilizing iris scissors. The opposing transposition arms were then transposed and carried over into place in opposite direction and anchored with interrupted buried subcutaneous sutures.
Zygomaticofacial Flap Text: Given the location of the defect, shape of the defect and the proximity to free margins a zygomaticofacial flap was deemed most appropriate for repair.  Using a sterile surgical marker, the appropriate flap was drawn incorporating the defect and placing the expected incisions within the relaxed skin tension lines where possible. The area thus outlined was incised deep to adipose tissue with a #15 scalpel blade with preservation of a vascular pedicle.  The skin margins were undermined to an appropriate distance in all directions utilizing iris scissors.  The flap was then placed into the defect and anchored with interrupted buried subcutaneous sutures.
Cheek Interpolation Flap Text: A decision was made to reconstruct the defect utilizing an interpolation axial flap and a staged reconstruction.  A telfa template was made of the defect.  This telfa template was then used to outline the Cheek Interpolation flap.  The donor area for the pedicle flap was then injected with anesthesia.  The flap was excised through the skin and subcutaneous tissue down to the layer of the underlying musculature.  The interpolation flap was carefully excised within this deep plane to maintain its blood supply.  The edges of the donor site were undermined.   The donor site was closed in a primary fashion.  The pedicle was then rotated into position and sutured.  Once the tube was sutured into place, adequate blood supply was confirmed with blanching and refill.  The pedicle was then wrapped with xeroform gauze and dressed appropriately with a telfa and gauze bandage to ensure continued blood supply and protect the attached pedicle.
Cheek-To-Nose Interpolation Flap Text: A decision was made to reconstruct the defect utilizing an interpolation axial flap and a staged reconstruction.  A telfa template was made of the defect.  This telfa template was then used to outline the Cheek-To-Nose Interpolation flap.  The donor area for the pedicle flap was then injected with anesthesia.  The flap was excised through the skin and subcutaneous tissue down to the layer of the underlying musculature.  The interpolation flap was carefully excised within this deep plane to maintain its blood supply.  The edges of the donor site were undermined.   The donor site was closed in a primary fashion.  The pedicle was then rotated into position and sutured.  Once the tube was sutured into place, adequate blood supply was confirmed with blanching and refill.  The pedicle was then wrapped with xeroform gauze and dressed appropriately with a telfa and gauze bandage to ensure continued blood supply and protect the attached pedicle.
Interpolation Flap Text: A decision was made to reconstruct the defect utilizing an interpolation axial flap and a staged reconstruction.  A telfa template was made of the defect.  This telfa template was then used to outline the interpolation flap.  The donor area for the pedicle flap was then injected with anesthesia.  The flap was excised through the skin and subcutaneous tissue down to the layer of the underlying musculature.  The interpolation flap was carefully excised within this deep plane to maintain its blood supply.  The edges of the donor site were undermined.   The donor site was closed in a primary fashion.  The pedicle was then rotated into position and sutured.  Once the tube was sutured into place, adequate blood supply was confirmed with blanching and refill.  The pedicle was then wrapped with xeroform gauze and dressed appropriately with a telfa and gauze bandage to ensure continued blood supply and protect the attached pedicle.
Melolabial Interpolation Flap Text: A decision was made to reconstruct the defect utilizing an interpolation axial flap and a staged reconstruction.  A telfa template was made of the defect.  This telfa template was then used to outline the melolabial interpolation flap.  The donor area for the pedicle flap was then injected with anesthesia.  The flap was excised through the skin and subcutaneous tissue down to the layer of the underlying musculature.  The pedicle flap was carefully excised within this deep plane to maintain its blood supply.  The edges of the donor site were undermined.   The donor site was closed in a primary fashion.  The pedicle was then rotated into position and sutured.  Once the tube was sutured into place, adequate blood supply was confirmed with blanching and refill.  The pedicle was then wrapped with xeroform gauze and dressed appropriately with a telfa and gauze bandage to ensure continued blood supply and protect the attached pedicle.
Mastoid Interpolation Flap Text: A decision was made to reconstruct the defect utilizing an interpolation axial flap and a staged reconstruction.  A telfa template was made of the defect.  This telfa template was then used to outline the mastoid interpolation flap.  The donor area for the pedicle flap was then injected with anesthesia.  The flap was excised through the skin and subcutaneous tissue down to the layer of the underlying musculature.  The pedicle flap was carefully excised within this deep plane to maintain its blood supply.  The edges of the donor site were undermined.   The donor site was closed in a primary fashion.  The pedicle was then rotated into position and sutured.  Once the tube was sutured into place, adequate blood supply was confirmed with blanching and refill.  The pedicle was then wrapped with xeroform gauze and dressed appropriately with a telfa and gauze bandage to ensure continued blood supply and protect the attached pedicle.
Posterior Auricular Interpolation Flap Text: A decision was made to reconstruct the defect utilizing an interpolation axial flap and a staged reconstruction.  A telfa template was made of the defect.  This telfa template was then used to outline the posterior auricular interpolation flap.  The donor area for the pedicle flap was then injected with anesthesia.  The flap was excised through the skin and subcutaneous tissue down to the layer of the underlying musculature.  The pedicle flap was carefully excised within this deep plane to maintain its blood supply.  The edges of the donor site were undermined.   The donor site was closed in a primary fashion.  The pedicle was then rotated into position and sutured.  Once the tube was sutured into place, adequate blood supply was confirmed with blanching and refill.  The pedicle was then wrapped with xeroform gauze and dressed appropriately with a telfa and gauze bandage to ensure continued blood supply and protect the attached pedicle.
Paramedian Forehead Flap Text: A decision was made to reconstruct the defect utilizing an interpolation axial flap and a staged reconstruction.  A telfa template was made of the defect.  This telfa template was then used to outline the paramedian forehead pedicle flap.  The donor area for the pedicle flap was then injected with anesthesia.  The flap was excised through the skin and subcutaneous tissue down to the layer of the underlying musculature.  The pedicle flap was carefully excised within this deep plane to maintain its blood supply.  The edges of the donor site were undermined.   The donor site was closed in a primary fashion.  The pedicle was then rotated into position and sutured.  Once the tube was sutured into place, adequate blood supply was confirmed with blanching and refill.  The pedicle was then wrapped with xeroform gauze and dressed appropriately with a telfa and gauze bandage to ensure continued blood supply and protect the attached pedicle.
Abbe Flap (Upper To Lower Lip) Text: The defect of the lower lip was assessed and measured.  Given the location and size of the defect, an Abbe flap was deemed most appropriate. Using a sterile surgical marker, an appropriate Abbe flap was measured and drawn on the upper lip. Local anesthesia was then infiltrated.  A scalpel was then used to incise the upper lip through and through the skin, vermilion, muscle and mucosa, leaving the flap pedicled on the opposite side.  The flap was then rotated and transferred to the lower lip defect.  The flap was then sutured into place with a three layer technique, closing the orbicularis oris muscle layer with subcutaneous buried sutures, followed by a mucosal layer and an epidermal layer.
Abbe Flap (Lower To Upper Lip) Text: The defect of the upper lip was assessed and measured.  Given the location and size of the defect, an Abbe flap was deemed most appropriate. Using a sterile surgical marker, an appropriate Abbe flap was measured and drawn on the lower lip. Local anesthesia was then infiltrated. A scalpel was then used to incise the upper lip through and through the skin, vermilion, muscle and mucosa, leaving the flap pedicled on the opposite side.  The flap was then rotated and transferred to the lower lip defect.  The flap was then sutured into place with a three layer technique, closing the orbicularis oris muscle layer with subcutaneous buried sutures, followed by a mucosal layer and an epidermal layer.
Estlander Flap (Upper To Lower Lip) Text: The defect of the lower lip was assessed and measured.  Given the location and size of the defect, an Estlander flap was deemed most appropriate. Using a sterile surgical marker, an appropriate Estlander flap was measured and drawn on the upper lip. Local anesthesia was then infiltrated. A scalpel was then used to incise the lateral aspect of the flap, through skin, muscle and mucosa, leaving the flap pedicled medially.  The flap was then rotated and positioned to fill the lower lip defect.  The flap was then sutured into place with a three layer technique, closing the orbicularis oris muscle layer with subcutaneous buried sutures, followed by a mucosal layer and an epidermal layer.
Lip Wedge Excision Repair Text: Given the location of the defect and the proximity to free margins a full thickness wedge repair was deemed most appropriate.  Using a sterile surgical marker, the appropriate repair was drawn incorporating the defect and placing the expected incisions perpendicular to the vermillion border.  The vermillion border was also meticulously outlined to ensure appropriate reapproximation during the repair.  The area thus outlined was incised through and through with a #15 scalpel blade.  The muscularis and dermis were reaproximated with deep sutures following hemostasis. Care was taken to realign the vermillion border before proceeding with the superficial closure.  Once the vermillion was realigned the superfical and mucosal closure was finished.
Ftsg Text: The defect edges were debeveled with a #15 scalpel blade.  Given the location of the defect, shape of the defect and the proximity to free margins a full thickness skin graft was deemed most appropriate.  Using a sterile surgical marker, the primary defect shape was transferred to the donor site. The area thus outlined was incised deep to adipose tissue with a #15 scalpel blade.  The harvested graft was then trimmed of adipose tissue until only dermis and epidermis was left.  The skin margins of the secondary defect were undermined to an appropriate distance in all directions utilizing iris scissors.  The secondary defect was closed with interrupted buried subcutaneous sutures.  The skin edges were then re-apposed with running  sutures.  The skin graft was then placed in the primary defect and oriented appropriately.
Split-Thickness Skin Graft Text: The defect edges were debeveled with a #15 scalpel blade.  Given the location of the defect, shape of the defect and the proximity to free margins a split thickness skin graft was deemed most appropriate.  Using a sterile surgical marker, the primary defect shape was transferred to the donor site. The split thickness graft was then harvested.  The skin graft was then placed in the primary defect and oriented appropriately.
Pinch Graft Text: The defect edges were debeveled with a #15 scalpel blade. Given the location of the defect, shape of the defect and the proximity to free margins a pinch graft was deemed most appropriate. Using a sterile surgical marker, the primary defect shape was transferred to the donor site. The area thus outlined was incised deep to adipose tissue with a #15 scalpel blade.  The harvested graft was then trimmed of adipose tissue until only dermis and epidermis was left. The skin margins of the secondary defect were undermined to an appropriate distance in all directions utilizing iris scissors.  The secondary defect was closed with interrupted buried subcutaneous sutures.  The skin edges were then re-apposed with running  sutures.  The skin graft was then placed in the primary defect and oriented appropriately.
Burow's Graft Text: The defect edges were debeveled with a #15 scalpel blade.  Given the location of the defect, shape of the defect, the proximity to free margins and the presence of a standing cone deformity a Burow's skin graft was deemed most appropriate. The standing cone was removed and this tissue was then trimmed to the shape of the primary defect. The adipose tissue was also removed until only dermis and epidermis were left.  The skin margins of the secondary defect were undermined to an appropriate distance in all directions utilizing iris scissors.  The secondary defect was closed with interrupted buried subcutaneous sutures.  The skin edges were then re-apposed with running  sutures.  The skin graft was then placed in the primary defect and oriented appropriately.
Cartilage Graft Text: The defect edges were debeveled with a #15 scalpel blade.  Given the location of the defect, shape of the defect, the fact the defect involved a full thickness cartilage defect a cartilage graft was deemed most appropriate.  An appropriate donor site was identified, cleansed, and anesthetized. The cartilage graft was then harvested and transferred to the recipient site, oriented appropriately and then sutured into place.  The secondary defect was then repaired using a primary closure.
Composite Graft Text: The defect edges were debeveled with a #15 scalpel blade.  Given the location of the defect, shape of the defect, the proximity to free margins and the fact the defect was full thickness a composite graft was deemed most appropriate.  The defect was outline and then transferred to the donor site.  A full thickness graft was then excised from the donor site. The graft was then placed in the primary defect, oriented appropriately and then sutured into place.  The secondary defect was then repaired using a primary closure.
Epidermal Autograft Text: The defect edges were debeveled with a #15 scalpel blade.  Given the location of the defect, shape of the defect and the proximity to free margins an epidermal autograft was deemed most appropriate.  Using a sterile surgical marker, the primary defect shape was transferred to the donor site. The epidermal graft was then harvested.  The skin graft was then placed in the primary defect and oriented appropriately.
Dermal Autograft Text: The defect edges were debeveled with a #15 scalpel blade.  Given the location of the defect, shape of the defect and the proximity to free margins a dermal autograft was deemed most appropriate.  Using a sterile surgical marker, the primary defect shape was transferred to the donor site. The area thus outlined was incised deep to adipose tissue with a #15 scalpel blade.  The harvested graft was then trimmed of adipose and epidermal tissue until only dermis was left.  The skin graft was then placed in the primary defect and oriented appropriately.
Skin Substitute Text: The defect edges were debeveled with a #15 scalpel blade.  Given the location of the defect, shape of the defect and the proximity to free margins a skin substitute graft was deemed most appropriate.  The graft material was trimmed to fit the size of the defect. The graft was then placed in the primary defect and oriented appropriately.
Tissue Cultured Epidermal Autograft Text: The defect edges were debeveled with a #15 scalpel blade.  Given the location of the defect, shape of the defect and the proximity to free margins a tissue cultured epidermal autograft was deemed most appropriate.  The graft was then trimmed to fit the size of the defect.  The graft was then placed in the primary defect and oriented appropriately.
Xenograft Text: The defect edges were debeveled with a #15 scalpel blade.  Given the location of the defect, shape of the defect and the proximity to free margins a xenograft was deemed most appropriate.  The graft was then trimmed to fit the size of the defect.  The graft was then placed in the primary defect and oriented appropriately.
Purse String (Intermediate) Text: Given the location of the defect and the characteristics of the surrounding skin a pursestring intermediate closure was deemed most appropriate.  Undermining was performed circumfirentially around the surgical defect.  A purstring suture was then placed and tightened.
Purse String (Simple) Text: Given the location of the defect and the characteristics of the surrounding skin a purse string simple closure was deemed most appropriate.  Undermining was performed circumferentially around the surgical defect.  A purse string suture was then placed and tightened.
Complex Repair And Single Advancement Flap Text: The defect edges were debeveled with a #15 scalpel blade.  The primary defect was closed partially with a complex linear closure.  Given the location of the remaining defect, shape of the defect and the proximity to free margins a single advancement flap was deemed most appropriate for complete closure of the defect.  Using a sterile surgical marker, an appropriate advancement flap was drawn incorporating the defect and placing the expected incisions within the relaxed skin tension lines where possible.    The area thus outlined was incised deep to adipose tissue with a #15 scalpel blade.  The skin margins were undermined to an appropriate distance in all directions utilizing iris scissors.
Complex Repair And Double Advancement Flap Text: The defect edges were debeveled with a #15 scalpel blade.  The primary defect was closed partially with a complex linear closure.  Given the location of the remaining defect, shape of the defect and the proximity to free margins a double advancement flap was deemed most appropriate for complete closure of the defect.  Using a sterile surgical marker, an appropriate advancement flap was drawn incorporating the defect and placing the expected incisions within the relaxed skin tension lines where possible.    The area thus outlined was incised deep to adipose tissue with a #15 scalpel blade.  The skin margins were undermined to an appropriate distance in all directions utilizing iris scissors.
Complex Repair And Modified Advancement Flap Text: The defect edges were debeveled with a #15 scalpel blade.  The primary defect was closed partially with a complex linear closure.  Given the location of the remaining defect, shape of the defect and the proximity to free margins a modified advancement flap was deemed most appropriate for complete closure of the defect.  Using a sterile surgical marker, an appropriate advancement flap was drawn incorporating the defect and placing the expected incisions within the relaxed skin tension lines where possible.    The area thus outlined was incised deep to adipose tissue with a #15 scalpel blade.  The skin margins were undermined to an appropriate distance in all directions utilizing iris scissors.
Complex Repair And A-T Advancement Flap Text: The defect edges were debeveled with a #15 scalpel blade.  The primary defect was closed partially with a complex linear closure.  Given the location of the remaining defect, shape of the defect and the proximity to free margins an A-T advancement flap was deemed most appropriate for complete closure of the defect.  Using a sterile surgical marker, an appropriate advancement flap was drawn incorporating the defect and placing the expected incisions within the relaxed skin tension lines where possible.    The area thus outlined was incised deep to adipose tissue with a #15 scalpel blade.  The skin margins were undermined to an appropriate distance in all directions utilizing iris scissors.
Complex Repair And O-T Advancement Flap Text: The defect edges were debeveled with a #15 scalpel blade.  The primary defect was closed partially with a complex linear closure.  Given the location of the remaining defect, shape of the defect and the proximity to free margins an O-T advancement flap was deemed most appropriate for complete closure of the defect.  Using a sterile surgical marker, an appropriate advancement flap was drawn incorporating the defect and placing the expected incisions within the relaxed skin tension lines where possible.    The area thus outlined was incised deep to adipose tissue with a #15 scalpel blade.  The skin margins were undermined to an appropriate distance in all directions utilizing iris scissors.
Complex Repair And O-L Flap Text: The defect edges were debeveled with a #15 scalpel blade.  The primary defect was closed partially with a complex linear closure.  Given the location of the remaining defect, shape of the defect and the proximity to free margins an O-L flap was deemed most appropriate for complete closure of the defect.  Using a sterile surgical marker, an appropriate flap was drawn incorporating the defect and placing the expected incisions within the relaxed skin tension lines where possible.    The area thus outlined was incised deep to adipose tissue with a #15 scalpel blade.  The skin margins were undermined to an appropriate distance in all directions utilizing iris scissors.
Complex Repair And Bilobe Flap Text: The defect edges were debeveled with a #15 scalpel blade.  The primary defect was closed partially with a complex linear closure.  Given the location of the remaining defect, shape of the defect and the proximity to free margins a bilobe flap was deemed most appropriate for complete closure of the defect.  Using a sterile surgical marker, an appropriate advancement flap was drawn incorporating the defect and placing the expected incisions within the relaxed skin tension lines where possible.    The area thus outlined was incised deep to adipose tissue with a #15 scalpel blade.  The skin margins were undermined to an appropriate distance in all directions utilizing iris scissors.
Complex Repair And Melolabial Flap Text: The defect edges were debeveled with a #15 scalpel blade.  The primary defect was closed partially with a complex linear closure.  Given the location of the remaining defect, shape of the defect and the proximity to free margins a melolabial flap was deemed most appropriate for complete closure of the defect.  Using a sterile surgical marker, an appropriate advancement flap was drawn incorporating the defect and placing the expected incisions within the relaxed skin tension lines where possible.    The area thus outlined was incised deep to adipose tissue with a #15 scalpel blade.  The skin margins were undermined to an appropriate distance in all directions utilizing iris scissors.
Complex Repair And Rotation Flap Text: The defect edges were debeveled with a #15 scalpel blade.  The primary defect was closed partially with a complex linear closure.  Given the location of the remaining defect, shape of the defect and the proximity to free margins a rotation flap was deemed most appropriate for complete closure of the defect.  Using a sterile surgical marker, an appropriate advancement flap was drawn incorporating the defect and placing the expected incisions within the relaxed skin tension lines where possible.    The area thus outlined was incised deep to adipose tissue with a #15 scalpel blade.  The skin margins were undermined to an appropriate distance in all directions utilizing iris scissors.
Complex Repair And Rhombic Flap Text: The defect edges were debeveled with a #15 scalpel blade.  The primary defect was closed partially with a complex linear closure.  Given the location of the remaining defect, shape of the defect and the proximity to free margins a rhombic flap was deemed most appropriate for complete closure of the defect.  Using a sterile surgical marker, an appropriate advancement flap was drawn incorporating the defect and placing the expected incisions within the relaxed skin tension lines where possible.    The area thus outlined was incised deep to adipose tissue with a #15 scalpel blade.  The skin margins were undermined to an appropriate distance in all directions utilizing iris scissors.
Complex Repair And Transposition Flap Text: The defect edges were debeveled with a #15 scalpel blade.  The primary defect was closed partially with a complex linear closure.  Given the location of the remaining defect, shape of the defect and the proximity to free margins a transposition flap was deemed most appropriate for complete closure of the defect.  Using a sterile surgical marker, an appropriate advancement flap was drawn incorporating the defect and placing the expected incisions within the relaxed skin tension lines where possible.    The area thus outlined was incised deep to adipose tissue with a #15 scalpel blade.  The skin margins were undermined to an appropriate distance in all directions utilizing iris scissors.
Complex Repair And V-Y Plasty Text: The defect edges were debeveled with a #15 scalpel blade.  The primary defect was closed partially with a complex linear closure.  Given the location of the remaining defect, shape of the defect and the proximity to free margins a V-Y plasty was deemed most appropriate for complete closure of the defect.  Using a sterile surgical marker, an appropriate advancement flap was drawn incorporating the defect and placing the expected incisions within the relaxed skin tension lines where possible.    The area thus outlined was incised deep to adipose tissue with a #15 scalpel blade.  The skin margins were undermined to an appropriate distance in all directions utilizing iris scissors.
Complex Repair And M Plasty Text: The defect edges were debeveled with a #15 scalpel blade.  The primary defect was closed partially with a complex linear closure.  Given the location of the remaining defect, shape of the defect and the proximity to free margins an M plasty was deemed most appropriate for complete closure of the defect.  Using a sterile surgical marker, an appropriate advancement flap was drawn incorporating the defect and placing the expected incisions within the relaxed skin tension lines where possible.    The area thus outlined was incised deep to adipose tissue with a #15 scalpel blade.  The skin margins were undermined to an appropriate distance in all directions utilizing iris scissors.
Complex Repair And Double M Plasty Text: The defect edges were debeveled with a #15 scalpel blade.  The primary defect was closed partially with a complex linear closure.  Given the location of the remaining defect, shape of the defect and the proximity to free margins a double M plasty was deemed most appropriate for complete closure of the defect.  Using a sterile surgical marker, an appropriate advancement flap was drawn incorporating the defect and placing the expected incisions within the relaxed skin tension lines where possible.    The area thus outlined was incised deep to adipose tissue with a #15 scalpel blade.  The skin margins were undermined to an appropriate distance in all directions utilizing iris scissors.
Complex Repair And W Plasty Text: The defect edges were debeveled with a #15 scalpel blade.  The primary defect was closed partially with a complex linear closure.  Given the location of the remaining defect, shape of the defect and the proximity to free margins a W plasty was deemed most appropriate for complete closure of the defect.  Using a sterile surgical marker, an appropriate advancement flap was drawn incorporating the defect and placing the expected incisions within the relaxed skin tension lines where possible.    The area thus outlined was incised deep to adipose tissue with a #15 scalpel blade.  The skin margins were undermined to an appropriate distance in all directions utilizing iris scissors.
Complex Repair And Z Plasty Text: The defect edges were debeveled with a #15 scalpel blade.  The primary defect was closed partially with a complex linear closure.  Given the location of the remaining defect, shape of the defect and the proximity to free margins a Z plasty was deemed most appropriate for complete closure of the defect.  Using a sterile surgical marker, an appropriate advancement flap was drawn incorporating the defect and placing the expected incisions within the relaxed skin tension lines where possible.    The area thus outlined was incised deep to adipose tissue with a #15 scalpel blade.  The skin margins were undermined to an appropriate distance in all directions utilizing iris scissors.
Complex Repair And Dorsal Nasal Flap Text: The defect edges were debeveled with a #15 scalpel blade.  The primary defect was closed partially with a complex linear closure.  Given the location of the remaining defect, shape of the defect and the proximity to free margins a dorsal nasal flap was deemed most appropriate for complete closure of the defect.  Using a sterile surgical marker, an appropriate flap was drawn incorporating the defect and placing the expected incisions within the relaxed skin tension lines where possible.    The area thus outlined was incised deep to adipose tissue with a #15 scalpel blade.  The skin margins were undermined to an appropriate distance in all directions utilizing iris scissors.
Complex Repair And Ftsg Text: The defect edges were debeveled with a #15 scalpel blade.  The primary defect was closed partially with a complex linear closure.  Given the location of the defect, shape of the defect and the proximity to free margins a full thickness skin graft was deemed most appropriate to repair the remaining defect.  The graft was trimmed to fit the size of the remaining defect.  The graft was then placed in the primary defect, oriented appropriately, and sutured into place.
Complex Repair And Burow's Graft Text: The defect edges were debeveled with a #15 scalpel blade.  The primary defect was closed partially with a complex linear closure.  Given the location of the defect, shape of the defect, the proximity to free margins and the presence of a standing cone deformity a Burow's graft was deemed most appropriate to repair the remaining defect.  The graft was trimmed to fit the size of the remaining defect.  The graft was then placed in the primary defect, oriented appropriately, and sutured into place.
Complex Repair And Split-Thickness Skin Graft Text: The defect edges were debeveled with a #15 scalpel blade.  The primary defect was closed partially with a complex linear closure.  Given the location of the defect, shape of the defect and the proximity to free margins a split thickness skin graft was deemed most appropriate to repair the remaining defect.  The graft was trimmed to fit the size of the remaining defect.  The graft was then placed in the primary defect, oriented appropriately, and sutured into place.
Complex Repair And Epidermal Autograft Text: The defect edges were debeveled with a #15 scalpel blade.  The primary defect was closed partially with a complex linear closure.  Given the location of the defect, shape of the defect and the proximity to free margins an epidermal autograft was deemed most appropriate to repair the remaining defect.  The graft was trimmed to fit the size of the remaining defect.  The graft was then placed in the primary defect, oriented appropriately, and sutured into place.
Complex Repair And Dermal Autograft Text: The defect edges were debeveled with a #15 scalpel blade.  The primary defect was closed partially with a complex linear closure.  Given the location of the defect, shape of the defect and the proximity to free margins an dermal autograft was deemed most appropriate to repair the remaining defect.  The graft was trimmed to fit the size of the remaining defect.  The graft was then placed in the primary defect, oriented appropriately, and sutured into place.
Complex Repair And Tissue Cultured Epidermal Autograft Text: The defect edges were debeveled with a #15 scalpel blade.  The primary defect was closed partially with a complex linear closure.  Given the location of the defect, shape of the defect and the proximity to free margins an tissue cultured epidermal autograft was deemed most appropriate to repair the remaining defect.  The graft was trimmed to fit the size of the remaining defect.  The graft was then placed in the primary defect, oriented appropriately, and sutured into place.
Complex Repair And Skin Substitute Graft Text: The defect edges were debeveled with a #15 scalpel blade.  The primary defect was closed partially with a complex linear closure.  Given the location of the remaining defect, shape of the defect and the proximity to free margins a skin substitute graft was deemed most appropriate to repair the remaining defect.  The graft was trimmed to fit the size of the remaining defect.  The graft was then placed in the primary defect, oriented appropriately, and sutured into place.
Path Notes (To The Dermatopathologist): Please check margins.
Consent was obtained from the patient. The risks and benefits to therapy were discussed in detail. Specifically, the risks of infection, scarring, bleeding, prolonged wound healing, incomplete removal, allergy to anesthesia, nerve injury and recurrence were addressed. Prior to the procedure, the treatment site was clearly identified and confirmed by the patient. All components of Universal Protocol/PAUSE Rule completed.
Post-Care Instructions: I reviewed with the patient in detail post-care instructions. Patient is not to engage in any heavy lifting, exercise, or swimming for the next 14 days. Should the patient develop any fevers, chills, bleeding, severe pain patient will contact the office immediately.
Home Suture Removal Text: Patient was provided a home suture removal kit and will remove their sutures at home.  If they have any questions or difficulties they will call the office.
Where Do You Want The Question To Include Opioid Counseling Located?: Case Summary Tab
Billing Type: Third-Party Bill
Information: Selecting Yes will display possible errors in your note based on the variables you have selected. This validation is only offered as a suggestion for you. PLEASE NOTE THAT THE VALIDATION TEXT WILL BE REMOVED WHEN YOU FINALIZE YOUR NOTE. IF YOU WANT TO FAX A PRELIMINARY NOTE YOU WILL NEED TO TOGGLE THIS TO 'NO' IF YOU DO NOT WANT IT IN YOUR FAXED NOTE.

## 2024-10-01 ENCOUNTER — APPOINTMENT (RX ONLY)
Dept: URBAN - NONMETROPOLITAN AREA CLINIC 10 | Facility: CLINIC | Age: 74
Setting detail: DERMATOLOGY
End: 2024-10-01

## 2024-10-01 DIAGNOSIS — L59.0 ERYTHEMA AB IGNE [DERMATITIS AB IGNE]: ICD-10-CM

## 2024-10-01 DIAGNOSIS — D18.0 HEMANGIOMA: ICD-10-CM

## 2024-10-01 DIAGNOSIS — D22 MELANOCYTIC NEVI: ICD-10-CM

## 2024-10-01 DIAGNOSIS — L82.1 OTHER SEBORRHEIC KERATOSIS: ICD-10-CM

## 2024-10-01 DIAGNOSIS — L85.3 XEROSIS CUTIS: ICD-10-CM

## 2024-10-01 DIAGNOSIS — Z85.828 PERSONAL HISTORY OF OTHER MALIGNANT NEOPLASM OF SKIN: ICD-10-CM

## 2024-10-01 DIAGNOSIS — L21.8 OTHER SEBORRHEIC DERMATITIS: ICD-10-CM

## 2024-10-01 PROBLEM — D18.01 HEMANGIOMA OF SKIN AND SUBCUTANEOUS TISSUE: Status: ACTIVE | Noted: 2024-10-01

## 2024-10-01 PROBLEM — D22.5 MELANOCYTIC NEVI OF TRUNK: Status: ACTIVE | Noted: 2024-10-01

## 2024-10-01 PROCEDURE — ? PRESCRIPTION MEDICATION MANAGEMENT

## 2024-10-01 PROCEDURE — 99213 OFFICE O/P EST LOW 20 MIN: CPT

## 2024-10-01 PROCEDURE — ? ADDITIONAL NOTES

## 2024-10-01 PROCEDURE — ? COUNSELING

## 2024-10-01 PROCEDURE — ? PRESCRIPTION

## 2024-10-01 RX ORDER — AMMONIUM LACTATE 12 G/100G
LARGE PEA-SIZED AMOUNT LOTION TOPICAL BID
Qty: 400 | Refills: 5 | Status: ERX | COMMUNITY
Start: 2024-10-01

## 2024-10-01 RX ADMIN — AMMONIUM LACTATE LARGE PEA-SIZED AMOUNT: 12 LOTION TOPICAL at 00:00

## 2024-10-01 ASSESSMENT — LOCATION DETAILED DESCRIPTION DERM
LOCATION DETAILED: LEFT LATERAL SUPERIOR EYELID
LOCATION DETAILED: RIGHT ANTERIOR PROXIMAL THIGH
LOCATION DETAILED: UPPER STERNUM
LOCATION DETAILED: RIGHT PROXIMAL POSTERIOR UPPER ARM
LOCATION DETAILED: LEFT ANTERIOR PROXIMAL THIGH
LOCATION DETAILED: RIGHT SUPERIOR UPPER BACK
LOCATION DETAILED: LEFT DISTAL PRETIBIAL REGION
LOCATION DETAILED: LEFT SUPERIOR LATERAL UPPER BACK
LOCATION DETAILED: LEFT PLANTAR FOREFOOT OVERLYING 3RD METATARSAL
LOCATION DETAILED: LEFT SUPERIOR PARIETAL SCALP
LOCATION DETAILED: LEFT PROXIMAL POSTERIOR UPPER ARM
LOCATION DETAILED: RIGHT DISTAL PRETIBIAL REGION
LOCATION DETAILED: RIGHT MID-UPPER BACK
LOCATION DETAILED: RIGHT MEDIAL PLANTAR MIDFOOT

## 2024-10-01 ASSESSMENT — LOCATION ZONE DERM
LOCATION ZONE: TRUNK
LOCATION ZONE: FEET
LOCATION ZONE: SCALP
LOCATION ZONE: ARM
LOCATION ZONE: LEG
LOCATION ZONE: EYELID

## 2024-10-01 ASSESSMENT — LOCATION SIMPLE DESCRIPTION DERM
LOCATION SIMPLE: RIGHT POSTERIOR UPPER ARM
LOCATION SIMPLE: RIGHT PLANTAR SURFACE
LOCATION SIMPLE: RIGHT UPPER BACK
LOCATION SIMPLE: RIGHT PRETIBIAL REGION
LOCATION SIMPLE: LEFT PRETIBIAL REGION
LOCATION SIMPLE: RIGHT THIGH
LOCATION SIMPLE: CHEST
LOCATION SIMPLE: LEFT POSTERIOR UPPER ARM
LOCATION SIMPLE: SCALP
LOCATION SIMPLE: LEFT SUPERIOR EYELID
LOCATION SIMPLE: LEFT PLANTAR SURFACE
LOCATION SIMPLE: LEFT UPPER BACK
LOCATION SIMPLE: LEFT THIGH

## 2024-10-24 RX ORDER — KETOCONAZOLE 20 MG/ML
1 SHAMPOO, SUSPENSION TOPICAL
Qty: 360 | Refills: 1 | Status: ERX

## 2024-10-24 RX ORDER — AMMONIUM LACTATE 12 G/100G
LARGE PEA-SIZED AMOUNT LOTION TOPICAL BID
Qty: 1200 | Refills: 1 | Status: ERX

## 2025-04-08 ENCOUNTER — APPOINTMENT (OUTPATIENT)
Dept: URBAN - NONMETROPOLITAN AREA CLINIC 10 | Facility: CLINIC | Age: 75
Setting detail: DERMATOLOGY
End: 2025-04-08

## 2025-04-08 DIAGNOSIS — L81.4 OTHER MELANIN HYPERPIGMENTATION: ICD-10-CM

## 2025-04-08 DIAGNOSIS — Z85.828 PERSONAL HISTORY OF OTHER MALIGNANT NEOPLASM OF SKIN: ICD-10-CM

## 2025-04-08 DIAGNOSIS — L82.1 OTHER SEBORRHEIC KERATOSIS: ICD-10-CM

## 2025-04-08 DIAGNOSIS — D22 MELANOCYTIC NEVI: ICD-10-CM

## 2025-04-08 PROBLEM — D22.5 MELANOCYTIC NEVI OF TRUNK: Status: ACTIVE | Noted: 2025-04-08

## 2025-04-08 PROCEDURE — ? ADDITIONAL NOTES

## 2025-04-08 PROCEDURE — 99213 OFFICE O/P EST LOW 20 MIN: CPT

## 2025-04-08 PROCEDURE — ? COUNSELING

## 2025-04-08 ASSESSMENT — LOCATION SIMPLE DESCRIPTION DERM
LOCATION SIMPLE: CHEST
LOCATION SIMPLE: LEFT HAND
LOCATION SIMPLE: LEFT UPPER BACK
LOCATION SIMPLE: RIGHT HAND
LOCATION SIMPLE: LEFT SUPERIOR EYELID
LOCATION SIMPLE: FRONTAL SCALP

## 2025-04-08 ASSESSMENT — LOCATION ZONE DERM
LOCATION ZONE: SCALP
LOCATION ZONE: HAND
LOCATION ZONE: EYELID
LOCATION ZONE: TRUNK

## 2025-04-08 ASSESSMENT — LOCATION DETAILED DESCRIPTION DERM
LOCATION DETAILED: LEFT LATERAL SUPERIOR EYELID
LOCATION DETAILED: LEFT RADIAL DORSAL HAND
LOCATION DETAILED: MIDDLE STERNUM
LOCATION DETAILED: LEFT INFERIOR MEDIAL UPPER BACK
LOCATION DETAILED: MEDIAL FRONTAL SCALP
LOCATION DETAILED: RIGHT RADIAL DORSAL HAND

## 2025-04-08 NOTE — PROCEDURE: ADDITIONAL NOTES
Render Risk Assessment In Note?: no
Detail Level: Simple
Additional Notes: BX proven - [facility] 2/21/24: Asc#: [id number]

## (undated) DEVICE — IRRIGATION-H2O 1000ML

## (undated) DEVICE — SYRINGE-ASEPTO IRRIGATION

## (undated) DEVICE — CAUTERY PAD-POLYHESIVE II ADULT

## (undated) DEVICE — GOWN-SURG XL LVL 3 REINFORCED

## (undated) DEVICE — GLV-7.5 BIOGEL LATEX

## (undated) DEVICE — SCD SLEEVE-KNEE REG.

## (undated) DEVICE — LIGHT HANDLE COVER

## (undated) DEVICE — NDL-18G 1 1/2" NON-SAFETY

## (undated) DEVICE — SENSOR-OXISENSOR II ADULT

## (undated) DEVICE — SYRINGE-10CC SLIP TIP

## (undated) DEVICE — CONNECTOR-ERBEFLO 2 PORT

## (undated) DEVICE — CATH TRAY-16FR METER W/STATLOCK LATEX]

## (undated) DEVICE — SYRINGE-30CC LUER LOCK

## (undated) DEVICE — MARKER-SKIN REG

## (undated) DEVICE — BIN-COVIDIEN MESH BIN

## (undated) DEVICE — TUBING-SEECLEAR LAPAROSCOPIC SMOKE EVACUATION

## (undated) DEVICE — CAUTERY-INSULATED 2.75" EDGE

## (undated) DEVICE — SUTURE-PDS II 0 CT-2 Z334H

## (undated) DEVICE — SNARE-ROTATABLE 20MM MINI OVAL

## (undated) DEVICE — SUTURE-PROLENE 2-0 SH 8833H

## (undated) DEVICE — TUBE-SALEM SUMP 18FR STOMACH SUCTION

## (undated) DEVICE — SOL-NACL 0.9% 1000ML

## (undated) DEVICE — SUTURE-PROLENE 2-0 CT-2 8411H

## (undated) DEVICE — SCISSOR-ENDOPATH 5MM CURVED

## (undated) DEVICE — BLADE-SCALPEL #11

## (undated) DEVICE — SUTURE-MONOCRYL 4-0 PS-2 Y496G

## (undated) DEVICE — DRSG-ISLAND 4IN X 5IN

## (undated) DEVICE — IRRIGATION-NACL 1000ML

## (undated) DEVICE — SYRINGE-5CC LUER LOCK

## (undated) DEVICE — SYRINGE-10CC LUER LOCK

## (undated) DEVICE — TRAP-POLYP E-TRAP

## (undated) DEVICE — TUBING-SUCTION 20FT

## (undated) DEVICE — SUTURE-VICRYL 3-0 SH J416H

## (undated) DEVICE — DRSG-SPONGE STERILE 4 X 4

## (undated) DEVICE — DRSG-SPONGE X-RAY 4 X 4

## (undated) DEVICE — TROCAR SLEEVE-KII 5X100MM

## (undated) DEVICE — SUTURE-VICRYL 0 UR-6 J603H

## (undated) DEVICE — STERI-STRIP-1/2" X 4"

## (undated) DEVICE — LABEL-STERILE PREPRINTED FOR OR

## (undated) DEVICE — APPLICATOR-CHLORAPREP 26ML TINTED CHG 2%+ 70% IPA-SURGICAL

## (undated) DEVICE — COVER-IVAS TRANSDUCER SLEEVE (6X58')

## (undated) DEVICE — COVER-MAYO STAND 23" X 55"

## (undated) DEVICE — FORCEP-COLON BIOPSY LARGE W/NEEDLE 240CM

## (undated) DEVICE — BLANKET-BAIR UPPER BODY

## (undated) DEVICE — DRAPE-C-ARM

## (undated) DEVICE — CAUTERY-LAP L-HOOK

## (undated) DEVICE — PUNCTURE CLOSURE DEVICE

## (undated) DEVICE — CANISTER-SUCTION 2000CC

## (undated) DEVICE — SWABSTICK-BENZOIN

## (undated) DEVICE — TAPE-MEDIPORE 4" X 2YD

## (undated) DEVICE — TOPICAL SKIN ADHESIVE EXOFIN

## (undated) DEVICE — NDL-INSUFFLATION 120MM

## (undated) DEVICE — SURGICAL BINDER-ABDOMINAL 55-72

## (undated) DEVICE — DRAPE-UTILITY TOWEL 15X26"

## (undated) DEVICE — DRAPE-IOBAN 2 35CM X 35CM

## (undated) DEVICE — SUTURE-VICRYL 2-0 CT-1 J945H

## (undated) DEVICE — TOWEL-OR DISP 4PKS

## (undated) DEVICE — Device

## (undated) DEVICE — SUCTION-IRRIGATION STRYKEFLOW II (STRYKER)

## (undated) DEVICE — SECURESTRAP 25 STRAP FIXATION DEVICE

## (undated) DEVICE — WANDS-INSULSCAN

## (undated) DEVICE — NDL-25G 1-1/2" NON-SAFETY

## (undated) DEVICE — CLEARIFY VISUALIZATION SYSTEM (SCOPE WARMER)

## (undated) DEVICE — LIGASURE-5MM BLUNT TIP LAPAROSCOPIC

## (undated) DEVICE — TROCAR-5X100MM FIOS BLADELESS

## (undated) DEVICE — PACK-LAPAROSCOPY-CUSTOM

## (undated) DEVICE — DRAPE-IOBAN 2 60CM X 60CM

## (undated) DEVICE — CORD-LAPAROSCOPIC MONOPOLAR-DISPOSABLE

## (undated) DEVICE — PACK-LAPAROTOMY-CUSTOM

## (undated) RX ORDER — DEXAMETHASONE SODIUM PHOSPHATE 10 MG/ML
INJECTION, SOLUTION INTRAMUSCULAR; INTRAVENOUS
Status: DISPENSED
Start: 2018-11-28

## (undated) RX ORDER — LIDOCAINE HYDROCHLORIDE 20 MG/ML
INJECTION, SOLUTION EPIDURAL; INFILTRATION; INTRACAUDAL; PERINEURAL
Status: DISPENSED
Start: 2017-09-08

## (undated) RX ORDER — HYDROMORPHONE HYDROCHLORIDE 2 MG/ML
INJECTION, SOLUTION INTRAMUSCULAR; INTRAVENOUS; SUBCUTANEOUS
Status: DISPENSED
Start: 2018-11-28

## (undated) RX ORDER — ALBUTEROL SULFATE 90 UG/1
AEROSOL, METERED RESPIRATORY (INHALATION)
Status: DISPENSED
Start: 2018-11-28

## (undated) RX ORDER — PROPOFOL 10 MG/ML
INJECTION, EMULSION INTRAVENOUS
Status: DISPENSED
Start: 2017-09-08

## (undated) RX ORDER — PROPOFOL 10 MG/ML
INJECTION, EMULSION INTRAVENOUS
Status: DISPENSED
Start: 2017-06-21

## (undated) RX ORDER — NEOSTIGMINE METHYLSULFATE 1 MG/ML
VIAL (ML) INJECTION
Status: DISPENSED
Start: 2018-11-28

## (undated) RX ORDER — FENTANYL CITRATE 50 UG/ML
INJECTION, SOLUTION INTRAMUSCULAR; INTRAVENOUS
Status: DISPENSED
Start: 2018-11-28

## (undated) RX ORDER — PROPOFOL 10 MG/ML
INJECTION, EMULSION INTRAVENOUS
Status: DISPENSED
Start: 2018-08-16

## (undated) RX ORDER — FENTANYL CITRATE 50 UG/ML
INJECTION, SOLUTION INTRAMUSCULAR; INTRAVENOUS
Status: DISPENSED
Start: 2017-06-21

## (undated) RX ORDER — LIDOCAINE HYDROCHLORIDE 20 MG/ML
INJECTION, SOLUTION EPIDURAL; INFILTRATION; INTRACAUDAL; PERINEURAL
Status: DISPENSED
Start: 2017-06-21

## (undated) RX ORDER — GLYCOPYRROLATE 0.2 MG/ML
INJECTION, SOLUTION INTRAMUSCULAR; INTRAVENOUS
Status: DISPENSED
Start: 2018-11-28

## (undated) RX ORDER — PROPOFOL 10 MG/ML
INJECTION, EMULSION INTRAVENOUS
Status: DISPENSED
Start: 2018-11-28

## (undated) RX ORDER — FENTANYL CITRATE 50 UG/ML
INJECTION, SOLUTION INTRAMUSCULAR; INTRAVENOUS
Status: DISPENSED
Start: 2017-09-08

## (undated) RX ORDER — GLYCOPYRROLATE 0.2 MG/ML
INJECTION, SOLUTION INTRAMUSCULAR; INTRAVENOUS
Status: DISPENSED
Start: 2017-09-08

## (undated) RX ORDER — KETAMINE HCL IN 0.9 % NACL 20 MG/2 ML
SYRINGE (ML) INTRAVENOUS
Status: DISPENSED
Start: 2017-09-08

## (undated) RX ORDER — ONDANSETRON 2 MG/ML
INJECTION INTRAMUSCULAR; INTRAVENOUS
Status: DISPENSED
Start: 2018-11-28